# Patient Record
Sex: MALE | Race: WHITE | Employment: UNEMPLOYED | URBAN - NONMETROPOLITAN AREA
[De-identification: names, ages, dates, MRNs, and addresses within clinical notes are randomized per-mention and may not be internally consistent; named-entity substitution may affect disease eponyms.]

---

## 2022-01-20 ENCOUNTER — HOSPITAL ENCOUNTER (EMERGENCY)
Age: 32
Discharge: HOME | End: 2022-01-21
Attending: EMERGENCY MEDICINE
Payer: MEDICARE

## 2022-01-20 DIAGNOSIS — R07.9 CHEST PAIN, UNSPECIFIED TYPE: Primary | ICD-10-CM

## 2022-01-20 PROCEDURE — 99284 EMERGENCY DEPT VISIT MOD MDM: CPT

## 2022-01-20 PROCEDURE — 93005 ELECTROCARDIOGRAM TRACING: CPT | Performed by: EMERGENCY MEDICINE

## 2022-01-20 ASSESSMENT — PAIN SCALES - GENERAL: PAINLEVEL_OUTOF10: 10

## 2022-01-20 ASSESSMENT — PAIN DESCRIPTION - DESCRIPTORS: DESCRIPTORS: ITCHING;THROBBING

## 2022-01-20 ASSESSMENT — PAIN DESCRIPTION - LOCATION: LOCATION: CHEST

## 2022-01-21 ENCOUNTER — HOSPITAL ENCOUNTER (EMERGENCY)
Age: 32
Discharge: HOME | End: 2022-01-21
Payer: MEDICARE

## 2022-01-21 VITALS
WEIGHT: 122 LBS | SYSTOLIC BLOOD PRESSURE: 122 MMHG | HEART RATE: 92 BPM | HEIGHT: 63 IN | DIASTOLIC BLOOD PRESSURE: 74 MMHG | BODY MASS INDEX: 21.62 KG/M2 | OXYGEN SATURATION: 98 % | TEMPERATURE: 97.3 F | RESPIRATION RATE: 18 BRPM

## 2022-01-21 VITALS
DIASTOLIC BLOOD PRESSURE: 75 MMHG | HEIGHT: 63 IN | HEART RATE: 84 BPM | BODY MASS INDEX: 21.62 KG/M2 | WEIGHT: 122 LBS | TEMPERATURE: 97.3 F | SYSTOLIC BLOOD PRESSURE: 120 MMHG | RESPIRATION RATE: 16 BRPM | OXYGEN SATURATION: 97 %

## 2022-01-21 DIAGNOSIS — R07.9 CHEST PAIN, UNSPECIFIED TYPE: Primary | ICD-10-CM

## 2022-01-21 LAB
EKG ATRIAL RATE: 82 BPM
EKG DIAGNOSIS: NORMAL
EKG P AXIS: 39 DEGREES
EKG P-R INTERVAL: 114 MS
EKG Q-T INTERVAL: 370 MS
EKG QRS DURATION: 94 MS
EKG QTC CALCULATION (BAZETT): 432 MS
EKG R AXIS: 23 DEGREES
EKG T AXIS: 21 DEGREES
EKG VENTRICULAR RATE: 82 BPM
TROPONIN T: <0.01 NG/ML

## 2022-01-21 PROCEDURE — 84484 ASSAY OF TROPONIN QUANT: CPT

## 2022-01-21 PROCEDURE — 99284 EMERGENCY DEPT VISIT MOD MDM: CPT

## 2022-01-21 PROCEDURE — 93010 ELECTROCARDIOGRAM REPORT: CPT | Performed by: INTERNAL MEDICINE

## 2022-01-21 RX ORDER — IBUPROFEN 600 MG/1
600 TABLET ORAL ONCE
Status: DISCONTINUED | OUTPATIENT
Start: 2022-01-21 | End: 2022-01-21 | Stop reason: HOSPADM

## 2022-01-21 ASSESSMENT — PAIN SCALES - GENERAL: PAINLEVEL_OUTOF10: 6

## 2022-01-21 NOTE — ED PROVIDER NOTES
Schizo affective schizophrenia ([facility] [address]      FAMILY HISTORY  Family History   Problem Relation Age of Onset    Colon Cancer Neg Hx     Esophageal Cancer Neg Hx     Liver Cancer Neg Hx     Rectal Cancer Neg Hx     Stomach Cancer Neg Hx      SOCIAL HISTORY  Social History     Socioeconomic History    Marital status: Unknown     Spouse name: Not on file    Number of children: Not on file    Years of education: Not on file    Highest education level: Not on file   Occupational History    Not on file   Tobacco Use    Smoking status: Never Smoker    Smokeless tobacco: Never Used   Substance and Sexual Activity    Alcohol use: Never    Drug use: Never    Sexual activity: Not on file   Other Topics Concern    Not on file   Social History Narrative    Not on file     Social Determinants of Health     Financial Resource Strain:     Difficulty of Paying Living Expenses: Not on file   Food Insecurity:     Worried About Running Out of Food in the Last Year: Not on file    Aftab of Food in the Last Year: Not on file   Transportation Needs:     Lack of Transportation (Medical): Not on file    Lack of Transportation (Non-Medical):  Not on file   Physical Activity:     Days of Exercise per Week: Not on file    Minutes of Exercise per Session: Not on file   Stress:     Feeling of Stress : Not on file   Social Connections:     Frequency of Communication with Friends and Family: Not on file    Frequency of Social Gatherings with Friends and Family: Not on file    Attends Taoist Services: Not on file    Active Member of Clubs or Organizations: Not on file    Attends Club or Organization Meetings: Not on file    Marital Status: Not on file   Intimate Partner Violence:     Fear of Current or Ex-Partner: Not on file    Emotionally Abused: Not on file    Physically Abused: Not on file    Sexually Abused: Not on file   Housing Stability:     Unable to Pay for Housing in the Last Year: Not on file    Number of Places Lived in the Last Year: Not on file    Unstable Housing in the Last Year: Not on file       SURGICAL HISTORY  Past Surgical History:   Procedure Laterality Date    COLONOSCOPY      does not know    EGD      does not know     CURRENT MEDICATIONS  Previous Medications    ASPIRIN 81 MG EC TABLET    Take 81 mg by mouth daily    FESOTERODINE FUMARATE ER 8 MG TB24    Take 8 mg by mouth every evening     ALLERGIES  Allergies   Allergen Reactions    Codeine     Iodine        Nursing notes reviewed by myself for past medical history, family history, social history, surgical history, current medications, and allergies. PHYSICAL EXAM  VITAL SIGNS: Triage VS:    ED Triage Vitals   Enc Vitals Group      BP 01/21/22 0303 122/74      Pulse 01/21/22 0303 92      Resp 01/21/22 0303 18      Temp 01/21/22 0303 97.3 °F (36.3 °C)      Temp Source 01/21/22 0303 Oral      SpO2 01/21/22 0303 98 %      Weight 01/21/22 0305 122 lb (55.3 kg)      Height 01/21/22 0305 5' 3\" (1.6 m)      Head Circumference --       Peak Flow --       Pain Score --       Pain Loc --       Pain Edu? --       Excl. in [address]? --      Constitutional: Well developed, Well nourished, nontoxic appearing  HENT: Normocephalic, Atraumatic, Bilateral external ears normal, Nose normal.   Eyes: PERRL, EOMI, Conjunctiva normal, No discharge. No scleral icterus. Neck: Normal range of motion, No tenderness, Supple. Lymphatic: No lymphadenopathy noted. Cardiovascular: Normal heart rate,  Thorax & Lungs: , No respiratory distress  Musculoskeletal: Good range of motion in all major joints as observed. No major deformities noted. Neurologic: Alert & oriented x 3, GCS 15, normal motor function, Normal sensory function, CN II-XII grossly intact as tested, No focal deficits noted. Psychiatric: Affect normal, Judgment normal, Mood normal.     RADIOLOGY  Labs Reviewed - No data to display  I personally reviewed the images.  The radiologist's interpretation reveals:  Last Imaging results   No orders to display       MEDS GIVEN IN ED:  Medications - No data to display  [facility]  33year-old male presents emergency department complaint of chest pain after just being discharged myself less than 2 hours ago. He has had multiple evaluations for chest pain abdominal pain over the last 7 weeks including 6 evaluations for chest pain and evaluations for abdominal pain with reassuring imaging and laboratory studies. On his evaluation this emergency department earlier he had a reassuring EKG and a nonelevated troponin. He was instructed to follow-up with cardiology and given follow-up resource information for cardiology. He was discharged with strict return precautions but presents here because he states he wants more answers. I informed patient that I had no further diagnostic testing to provide him currently and he does not necessitate emergent hospitalization. Patient is frustrated but voices understanding. Discharged again with instructions to call cardiology team today for follow-up appointment. Return precautions provided. Amount and/or Complexity of Data Reviewed  Clinical lab tests: reviewed  Decide to obtain previous medical records or to obtain history from someone other than the patient: yes       -  Patient seen and evaluated in the emergency department. -  Triage and nursing notes reviewed and incorporated. -  Old chart records reviewed and incorporated. -  Work-up included:  See above  -  Results discussed with patient. Appropriate PPE utilized as indicated for entire patient encounter? Time of Disposition: See timeline  ? New Prescriptions    No medications on file     FINAL IMPRESSION  1. Chest pain, unspecified type        Electronically signed by:  [address] Manan, DO, 1/21/2022         [address] Manan, DO  01/21/22 4872

## 2022-01-21 NOTE — ED NOTES
Discharge instructions reviewed and pt acknowledges understanding. Ambulatory at discharge.      Odell Klein RN  01/21/22 8983

## 2022-01-21 NOTE — ED NOTES
Patient apperears to be stable at this time, laughing and talking without any issues. States was at [facility] yesterday for abdominal pain.      Cristiano Lujan RN  01/20/22 5754

## 2022-01-21 NOTE — ED PROVIDER NOTES
CHIEF COMPLAINT    Chief Complaint   Patient presents with    Chest Pain     C/o chest pain across the chest, itching that started yesterday. Patient states he was at [address], \"I received injections that was supossed to help me. \" Patient states he has been washing damon with his sister. Patient was laying in chair in waiting room, playing on cell phone, appears to be in no distress. MERRY Carson is a 33 y.o. male with history of schizoaffective disorder, MRDD, chronic abdominal pain who presents to the ED with complaints of chest pain. Patient has been evaluated multiple times in the emergency department since December 2021 for complaints of abdominal pain and chest pain. During that time has underwent multiple laboratory study evaluations as well as imaging studies of the abdomen and chest.  He has resulted with negative troponins as well as negative D-dimers. He was last seen in the emergency department on 01/12/2022 at [place] at which time he had some T wave inversions in the inferior leads of his EKG but 2 sets of troponin were negative. Case was discussed with cardiology team there and patient was provided outpatient follow-up resource information. He presents here this evening with complaints of continued chest pain across the anterior chest.  It is described as burning and pressure. Pain is constant. Nothing seems to make it much better or worse. Pain does not radiate. When asked about his follow-up appointment the patient tells me his appointment is sometime in April with the cardiology group at [place].  He denies fevers, chills, nausea, vomiting, dizziness, lightheadedness. REVIEW OF SYSTEMS  Constitutional: No fever, chills or recent illness. Eye: No visual changes  HENT: No earache or sore throat. Resp: No SOB or productive cough. Cardio: Complains of chest pain  GI: No abdominal pain, nausea, vomiting, constipation or diarrhea.  No melena. : No dysuria, urgency or frequency. Endocrine: No heat intolerance, no cold intolerance, no polydipsia   Lymphatics: No adenopathy  Musculoskeletal: No new muscle aches or joint pain. Neuro: No headaches. Psych: No homicidal or suicidal thoughts  Skin: No rash, No itching. ?  ? PAST MEDICAL HISTORY  Past Medical History:   Diagnosis Date    Bipolar 1 disorder ([facility]     Chronic generalized abdominal pain     Depression     GERD (gastroesophageal reflux disease)     IBS (irritable bowel syndrome)     Mental impairment     OCD (obsessive compulsive disorder)     Schizo affective schizophrenia ([facility]      FAMILY HISTORY  Family History   Problem Relation Age of Onset    Colon Cancer Neg Hx     Esophageal Cancer Neg Hx     Liver Cancer Neg Hx     Rectal Cancer Neg Hx     Stomach Cancer Neg Hx      SOCIAL HISTORY  Social History     Socioeconomic History    Marital status: Unknown     Spouse name: None    Number of children: None    Years of education: None    Highest education level: None   Occupational History    None   Tobacco Use    Smoking status: Never Smoker    Smokeless tobacco: Never Used   Substance and Sexual Activity    Alcohol use: Never    Drug use: Never    Sexual activity: None   Other Topics Concern    None   Social History Narrative    None     Social Determinants of Health     Financial Resource Strain:     Difficulty of Paying Living Expenses: Not on file   Food Insecurity:     Worried About Running Out of Food in the Last Year: Not on file    Aftab of Food in the Last Year: Not on file   Transportation Needs:     Lack of Transportation (Medical): Not on file    Lack of Transportation (Non-Medical):  Not on file   Physical Activity:     Days of Exercise per Week: Not on file    Minutes of Exercise per Session: Not on file   Stress:     Feeling of Stress : Not on file   Social Connections:     Frequency of Communication with Friends and Family: Not on file    Frequency of Social Gatherings with Friends and Family: Not on file    Attends Pentecostalism Services: Not on file    Active Member of Clubs or Organizations: Not on file    Attends Club or Organization Meetings: Not on file    Marital Status: Not on file   Intimate Partner Violence:     Fear of Current or Ex-Partner: Not on file    Emotionally Abused: Not on file    Physically Abused: Not on file    Sexually Abused: Not on file   Housing Stability:     Unable to Pay for Housing in the Last Year: Not on file    Number of [place] in the Last Year: Not on file    Unstable Housing in the Last Year: Not on file       SURGICAL HISTORY  Past Surgical History:   Procedure Laterality Date    COLONOSCOPY      does not know    EGD      does not know     CURRENT MEDICATIONS  Previous Medications    ASPIRIN 81 MG EC TABLET    Take 81 mg by mouth daily    FESOTERODINE FUMARATE ER 8 MG TB24    Take 8 mg by mouth every evening     ALLERGIES  Allergies   Allergen Reactions    Codeine     Iodine        Nursing notes reviewed by myself for past medical history, family history, social history, surgical history, current medications, and allergies. PHYSICAL EXAM  VITAL SIGNS: Triage VS:    ED Triage Vitals [01/20/22 2307]   Enc Vitals Group      /75      Pulse 101      Resp 16      Temp 97.3 °F (36.3 °C)      Temp Source Oral      SpO2 97 %      Weight 122 lb (55.3 kg)      Height 5' 3\" (1.6 m)      Head Circumference       Peak Flow       Pain Score       Pain Loc       Pain Edu? Excl. in [address]? Constitutional: Well developed, Well nourished, nontoxic appearing  HENT: Normocephalic, Atraumatic, Bilateral external ears normal, Oropharynx moist, No oral exudates, Nose normal.   Eyes: PERRL, EOMI, Conjunctiva normal, No discharge. No scleral icterus. Neck: Normal range of motion, No tenderness, Supple. Lymphatic: No lymphadenopathy noted.    Cardiovascular: Normal heart rate, Normal rhythm, No murmurs, gallops or rubs. Thorax & Lungs: Normal breath sounds, No respiratory distress, No wheezing. Abdomen: Soft, No tenderness, No masses, No pulsatile masses, No distention, Normal bowel sounds  Skin: Warm, Dry, Pink, No mottling, No erythema, No rash. Back: No tenderness, No CVA tenderness. Extremities: No edema, No tenderness, No cyanosis, Normal perfusion, No clubbing. Musculoskeletal: Good range of motion in all major joints as observed. No major deformities noted. Neurologic: Alert & oriented x 3,  No focal deficits noted. Psychiatric: Affect normal, Judgment normal, Mood normal.   EKG  Per my interpretation demonstrates normal sinus rhythm with sinus arrhythmia at a rate of 82 bpm.  Normal axis. Normal intervals. No acute ST segment changes. RADIOLOGY  Labs Reviewed   TROPONIN     I personally reviewed the images. The radiologist's interpretation reveals:  Last Imaging results   No orders to display       MEDS GIVEN IN ED:  Medications   ibuprofen (ADVIL;MOTRIN) tablet 600 mg (600 mg Oral Not Given 1/21/22 0138)     [address] [place] [address] Drive [facility]  68-year-old male presents the emergency department with complaints of chest pain across the anterior chest.  Has been evaluated for chest pain or abdominal pain multiple times in the last month with reassuring evaluations. His initial vital signs here are reassuring. He is nontoxic-appearing on exam.  His lungs are clear to auscultation bilaterally. EKG obtained here is without acute ischemic changes. At this time we will obtain troponin level as well. Given his multiple evaluations recently do not feel that further work-up is indicated beyond an EKG and troponin here. Patient requested pain medication here and Motrin was ordered which she refused. His troponin remains nonelevated.   At this time given the patient's multiple evaluations in the emergency department for similar complaint recently I do not feel that further work-up is indicated and he is appropriate discharge. Provided with follow-up resource information for cardiology. Return precautions provided. Amount and/or Complexity of Data Reviewed  Clinical lab tests: reviewed  Decide to obtain previous medical records or to obtain history from someone other than the patient: yes       -  Patient seen and evaluated in the emergency department. -  Triage and nursing notes reviewed and incorporated. -  Old chart records reviewed and incorporated. -  Work-up included:  See above  -  Results discussed with patient. Appropriate PPE utilized as indicated for entire patient encounter? Time of Disposition: See timeline  ? New Prescriptions    No medications on file     FINAL IMPRESSION  1. Chest pain, unspecified type        Electronically signed by:  [address] Manan, DO, 1/21/2022         [address] Manan, DO  01/21/22 9316

## 2022-01-21 NOTE — ED TRIAGE NOTES
Arrived ambulatory to room 11 for triage. Tolerated without difficulty. Bed in lowest position. Call light given.

## 2022-01-21 NOTE — ED NOTES
Patient upset that it was taking to long, he refused pain medication stating that it will not work, patient left without discharge paperwork.      Devi Campos RN  01/21/22 3123

## 2022-02-22 ENCOUNTER — HOSPITAL ENCOUNTER (EMERGENCY)
Age: 32
Discharge: HOME | End: 2022-02-22
Attending: EMERGENCY MEDICINE
Payer: MEDICARE

## 2022-02-22 VITALS
HEART RATE: 106 BPM | OXYGEN SATURATION: 97 % | SYSTOLIC BLOOD PRESSURE: 113 MMHG | DIASTOLIC BLOOD PRESSURE: 75 MMHG | TEMPERATURE: 99 F | RESPIRATION RATE: 20 BRPM

## 2022-02-22 DIAGNOSIS — G89.29 CHRONIC CHEST WALL PAIN: Primary | ICD-10-CM

## 2022-02-22 DIAGNOSIS — R07.89 CHRONIC CHEST WALL PAIN: Primary | ICD-10-CM

## 2022-02-22 DIAGNOSIS — F41.1 ANXIETY STATE: ICD-10-CM

## 2022-02-22 PROCEDURE — 99283 EMERGENCY DEPT VISIT LOW MDM: CPT

## 2022-02-22 PROCEDURE — 93005 ELECTROCARDIOGRAM TRACING: CPT | Performed by: EMERGENCY MEDICINE

## 2022-02-22 NOTE — ED NOTES
Pt arrived per squad, no s/s of acute distress. Respirations regular and even, skin warm and dry. 12 lead EKG obtained upon arrival, patient placed on telemetry monitoring w/ continuous pulse ox.    VSS, call light in hand     Georgina Bustamante RN  02/22/22 6477

## 2022-02-22 NOTE — ED PROVIDER NOTES
Triage Chief Complaint:   Tachycardia (in by squad,  w/out any distress )    [place]:  Jose F Schaeffer is a 33 y.o. male that presents to the ED complaint of chest pain. The patient has a very complex past medical history has been in emergency departments around [place] in the [facility] over 20 something times. He presents to the ED states that he was walking he called 911. He was complaint of chest pain he does have a past history of tachycardia questionable A. fib on Cardizem currently in sinus rhythm at a rate of 109 does enter the room. Immediately asked me if he is to be admitted and to stay. He is not suicidal he has a history of schizoaffective disorder denies any active auditory visualizations. No shortness of breath patient smokes denies vaping no marijuana denies IV drug use. Patient works she typically takes the bus to [place].   He denies being under any stressful situation he has residences in [facility]        Past Medical History:   Diagnosis Date    B[facility] 1 [facility] [address]     Chronic generalized abdominal pain     Depression     GERD (gastroesophageal reflux disease)     IBS (irritable bowel syndrome)     Mental impairment     OCD (obsessive compulsive disorder)     Schizo affective schizophrenia ([facility] [address]      Past Surgical History:   Procedure Laterality Date    COLONOSCOPY      does not know    EGD      does not know     Family History   Problem Relation Age of Onset    Colon Cancer Neg Hx     Esophageal Cancer Neg Hx     Liver Cancer Neg Hx     Rectal Cancer Neg Hx     Stomach Cancer Neg Hx      Social History     Socioeconomic History    Marital status: Unknown     Spouse name: Not on file    Number of children: Not on file    Years of education: Not on file    Highest education level: Not on file   Occupational History    Not on file   Tobacco Use    Smoking status: Never Smoker    Smokeless tobacco: Never Used   Substance and Sexual Activity    Alcohol use: Never    Drug use: Never    Sexual activity: Not on file   Other Topics Concern    Not on file   Social History Narrative    Not on file     Social Determinants of Health     Financial Resource Strain:     Difficulty of Paying Living Expenses: Not on file   Food Insecurity:     Worried About Running Out of Food in the Last Year: Not on file    Aftab of Food in the Last Year: Not on file   Transportation Needs:     Lack of Transportation (Medical): Not on file    Lack of Transportation (Non-Medical): Not on file   Physical Activity:     Days of Exercise per Week: Not on file    Minutes of Exercise per Session: Not on file   Stress:     Feeling of Stress : Not on file   Social Connections:     Frequency of Communication with Friends and Family: Not on file    Frequency of Social Gatherings with Friends and Family: Not on file    Attends Yarsanism Services: Not on file    Active Member of [address] [place] [address] [employer] or Organizations: Not on file    Attends Club or Organization Meetings: Not on file    Marital Status: Not on file   Intimate Partner Violence:     Fear of Current or Ex-Partner: Not on file    Emotionally Abused: Not on file    Physically Abused: Not on file    Sexually Abused: Not on file   Housing Stability:     Unable to Pay for Housing in the Last Year: Not on file    Number of [place] in the Last Year: Not on file    Unstable Housing in the Last Year: Not on file     No current facility-administered medications for this encounter. Current Outpatient Medications   Medication Sig Dispense Refill    aspirin 81 MG EC tablet Take 81 mg by mouth daily      Fesoterodine Fumarate ER 8 MG TB24 Take 8 mg by mouth every evening       Allergies   Allergen Reactions    Codeine     Iodine          ROS:    Review of Systems   Cardiovascular: Positive for palpitations. Negative for leg swelling. Psychiatric/Behavioral: Negative for confusion, dysphoric mood and suicidal ideas.  The patient is nervous/anxious. All other systems reviewed and are negative. Nursing Notes Reviewed    Physical Exam:      ED Triage Vitals [02/22/22 1558]   Enc Vitals Group      /83      Pulse 106      Resp 24      Temp 99 °F (37.2 °C)      Temp Source Temporal      SpO2 98 %      Weight       Height       Head Circumference       Peak Flow       Pain Score       Pain Loc       Pain Edu? Excl. in [address]? Physical Exam  Vitals and nursing note reviewed. Exam conducted with a chaperone present. Constitutional:       General: He is not in acute distress. Appearance: He is well-developed. He is not ill-appearing or toxic-appearing. HENT:      Head: Normocephalic and atraumatic. Right Ear: Ear canal and external ear normal.      Left Ear: Ear canal and external ear normal.      Nose: Nose normal.      Mouth/Throat:      Mouth: Mucous membranes are moist.   Eyes:      General: No scleral icterus. Right eye: No discharge. Left eye: No discharge. Conjunctiva/sclera: Conjunctivae normal.      Pupils: Pupils are equal, round, and reactive to light. Neck:      Thyroid: No thyromegaly. Vascular: No JVD. Trachea: No tracheal deviation. Cardiovascular:      Rate and Rhythm: Regular rhythm. Tachycardia present. Heart sounds: Normal heart sounds. No murmur heard. No friction rub. No gallop. Pulmonary:      Effort: Pulmonary effort is normal. No respiratory distress. Breath sounds: Normal breath sounds. No stridor. No wheezing or rales. Chest:      Chest wall: No tenderness. Abdominal:      General: Abdomen is flat. Bowel sounds are normal. There is no distension. Palpations: Abdomen is soft. There is no mass. Tenderness: There is no abdominal tenderness. There is no guarding or rebound. Hernia: No hernia is present. Musculoskeletal:         General: No tenderness or deformity. Normal range of motion.       Cervical back: Normal range of motion and neck supple. Lymphadenopathy:      Cervical: No cervical adenopathy. Skin:     General: Skin is warm and dry. Capillary Refill: Capillary refill takes less than 2 seconds. Coloration: Skin is not pale. Findings: No erythema or rash. Neurological:      General: No focal deficit present. Mental Status: He is alert and oriented to person, place, and time. Cranial Nerves: No cranial nerve deficit. Sensory: No sensory deficit. Deep Tendon Reflexes: Reflexes are normal and symmetric. Reflexes normal.   Psychiatric:         Attention and Perception: Attention normal.         Mood and Affect: Mood is anxious. Speech: Speech normal.         Behavior: Behavior normal.         Thought Content: Thought content normal.         Judgment: Judgment normal.         I have reviewed and interpreted all of the currently available lab results from this visit (ifapplicable):  Results for orders placed or performed during the hospital encounter of 02/22/22   EKG 12 Lead   Result Value Ref Range    Ventricular Rate 109 BPM    Atrial Rate 109 BPM    P-R Interval 116 ms    QRS Duration 90 ms    Q-T Interval 332 ms    QTc Calculation (Bazett) 447 ms    P Axis 47 degrees    R Axis 32 degrees    T Axis 39 degrees    Diagnosis Sinus tachycardia  Otherwise normal ECG         Radiographs (if obtained):  [] The following radiograph wasinterpreted by myself in the absence of a radiologist:   [] Radiologist's Report Reviewed:  No orders to display         EKG (if obtained): (All EKG's are interpreted by myself in the absence of a cardiologist)    The 12 lead EKG was interpreted by me, and the interpretation is as follows:  sinus tachycardia, asnx=938, rate = 109. Intervals are within the normal range. QTc is not prolonged. ST elevations are not present. T wave inversions are not present. Non-specific T wave changes are not present.   Delta waves, Brugada Syndrome, and Short MA are not present. There is no acute ischemia. Chart review shows recent radiographs:  No results found. MDM:    Janet Rubio presents ED via 911. He is an anxious well-appearing nontoxic male resting heart rate 109. He has no high risk features and review of system physical exam is unremarkable. He does have diffuse chest wall pain clearly has some underlying somatic complaints numerous ED visits. He needs appropriate follow-up with his PCP for further management I would highly recommend future clinicians not to choose potential medical intervention to satisfy the patient but make appropriate medical decisions. Patient clearly is not having an emergency medical condition. He wants to be admitted this is clearly an appropriate. Please note that portions of this note may have been completed with a voice recognition/dictation program. Efforts were made to edit the dictations but occasionally words are mis-transcribed.      All pertinent Lab data and radiographic results reviewed with patient at bedside. The patient and/or the family were informed of the results of any tests/labs/imaging, the treatment plan, and time was allotted to answer questions. See chart for details of medications given during the ED stay.     The likelihood of other entities in the differential is insufficient to justify any further testing for them. This was explained to the patient. The patient was advised that persistent or worsening symptoms would require further evaluation.         Clinical Impression:  1. Chronic chest wall pain    2. Anxiety state      Disposition referral (if applicable):  Geoffrey Anton  [address] [place] [address]  [phone]    Schedule an appointment as soon as possible for a visit in 1 day  If symptoms worsen    Disposition medications (if applicable):  New Prescriptions    No medications on file           Bernardo Harkins DO, FACEP      Comment: Please note this report has been produced using speech recognition software and maycontain errors related to that system including errors in grammar, punctuation, and spelling, as well as words and phrases that may be inappropriate. If there are any questions or concerns please feel free to contact thedictating provider for clarification.         Radhames Hayden,   02/22/22 9408

## 2022-02-23 LAB
EKG ATRIAL RATE: 109 BPM
EKG DIAGNOSIS: NORMAL
EKG P AXIS: 47 DEGREES
EKG P-R INTERVAL: 116 MS
EKG Q-T INTERVAL: 332 MS
EKG QRS DURATION: 90 MS
EKG QTC CALCULATION (BAZETT): 447 MS
EKG R AXIS: 32 DEGREES
EKG T AXIS: 39 DEGREES
EKG VENTRICULAR RATE: 109 BPM

## 2022-02-23 PROCEDURE — 93010 ELECTROCARDIOGRAM REPORT: CPT | Performed by: INTERNAL MEDICINE

## 2022-10-08 ENCOUNTER — HOSPITAL ENCOUNTER (EMERGENCY)
Age: 32
Discharge: HOME | End: 2022-10-08
Attending: EMERGENCY MEDICINE
Payer: COMMERCIAL

## 2022-10-08 VITALS
TEMPERATURE: 98.1 F | RESPIRATION RATE: 18 BRPM | SYSTOLIC BLOOD PRESSURE: 137 MMHG | HEART RATE: 85 BPM | WEIGHT: 121.6 LBS | OXYGEN SATURATION: 97 % | DIASTOLIC BLOOD PRESSURE: 78 MMHG | BODY MASS INDEX: 21.55 KG/M2 | HEIGHT: 63 IN

## 2022-10-08 DIAGNOSIS — R10.84 GENERALIZED ABDOMINAL PAIN: Primary | ICD-10-CM

## 2022-10-08 LAB
ALBUMIN SERPL-MCNC: 4.6 GM/DL (ref 3.4–5)
ALP BLD-CCNC: 70 IU/L (ref 40–128)
ALT SERPL-CCNC: 15 U/L (ref 10–40)
ANION GAP SERPL CALCULATED.3IONS-SCNC: 9 MMOL/L (ref 4–16)
AST SERPL-CCNC: 26 IU/L (ref 15–37)
BASOPHILS ABSOLUTE: 0 K/CU MM
BASOPHILS RELATIVE PERCENT: 0.2 % (ref 0–1)
BILIRUB SERPL-MCNC: 0.4 MG/DL (ref 0–1)
BILIRUBIN URINE: NEGATIVE
BLOOD, URINE: NEGATIVE
BUN BLDV-MCNC: 14 MG/DL (ref 6–23)
CALCIUM SERPL-MCNC: 9.5 MG/DL (ref 8.3–10.6)
CHLORIDE BLD-SCNC: 104 MMOL/L (ref 99–110)
CLARITY: CLEAR
CO2: 27 MMOL/L (ref 21–32)
COLOR: YELLOW
CREAT SERPL-MCNC: 0.7 MG/DL (ref 0.9–1.3)
DIFFERENTIAL TYPE: ABNORMAL
EOSINOPHILS ABSOLUTE: 0.1 K/CU MM
EOSINOPHILS RELATIVE PERCENT: 2.2 % (ref 0–3)
GFR AFRICAN AMERICAN: >60 ML/MIN/1.73M2
GFR NON-AFRICAN AMERICAN: >60 ML/MIN/1.73M2
GLUCOSE BLD-MCNC: 89 MG/DL (ref 70–99)
GLUCOSE, URINE: NEGATIVE MG/DL
HCT VFR BLD CALC: 40.6 % (ref 42–52)
HEMOGLOBIN: 13.4 GM/DL (ref 13.5–18)
IMMATURE NEUTROPHIL %: 0.2 % (ref 0–0.43)
KETONES, URINE: NEGATIVE MG/DL
LEUKOCYTE ESTERASE, URINE: NEGATIVE
LIPASE: 43 IU/L (ref 13–60)
LYMPHOCYTES ABSOLUTE: 2 K/CU MM
LYMPHOCYTES RELATIVE PERCENT: 33.1 % (ref 24–44)
MCH RBC QN AUTO: 31.8 PG (ref 27–31)
MCHC RBC AUTO-ENTMCNC: 33 % (ref 32–36)
MCV RBC AUTO: 96.4 FL (ref 78–100)
MONOCYTES ABSOLUTE: 0.5 K/CU MM
MONOCYTES RELATIVE PERCENT: 7.6 % (ref 0–4)
NITRITE URINE, QUANTITATIVE: NEGATIVE
NUCLEATED RBC %: 0 %
PDW BLD-RTO: 12.1 % (ref 11.7–14.9)
PH, URINE: 6
PLATELET # BLD: 206 K/CU MM (ref 140–440)
PMV BLD AUTO: 10 FL (ref 7.5–11.1)
POTASSIUM SERPL-SCNC: 3.5 MMOL/L (ref 3.5–5.1)
PROTEIN UA: NEGATIVE MG/DL
RBC # BLD: 4.21 M/CU MM (ref 4.6–6.2)
SEGMENTED NEUTROPHILS ABSOLUTE COUNT: 3.4 K/CU MM
SEGMENTED NEUTROPHILS RELATIVE PERCENT: 56.7 % (ref 36–66)
SODIUM BLD-SCNC: 140 MMOL/L (ref 135–145)
SPECIFIC GRAVITY UA: >1.03
TOTAL IMMATURE NEUTOROPHIL: 0.01 K/CU MM
TOTAL NUCLEATED RBC: 0 K/CU MM
TOTAL PROTEIN: 6.8 GM/DL (ref 6.4–8.2)
UROBILINOGEN, URINE: 0.2 MG/DL
WBC # BLD: 6 K/CU MM (ref 4–10.5)

## 2022-10-08 PROCEDURE — 6360000002 HC RX W HCPCS: Performed by: EMERGENCY MEDICINE

## 2022-10-08 PROCEDURE — 99284 EMERGENCY DEPT VISIT MOD MDM: CPT

## 2022-10-08 PROCEDURE — 85025 COMPLETE CBC W/AUTO DIFF WBC: CPT

## 2022-10-08 PROCEDURE — 83690 ASSAY OF LIPASE: CPT

## 2022-10-08 PROCEDURE — 80053 COMPREHEN METABOLIC PANEL: CPT

## 2022-10-08 PROCEDURE — 96372 THER/PROPH/DIAG INJ SC/IM: CPT

## 2022-10-08 PROCEDURE — 6370000000 HC RX 637 (ALT 250 FOR IP): Performed by: EMERGENCY MEDICINE

## 2022-10-08 PROCEDURE — 81003 URINALYSIS AUTO W/O SCOPE: CPT

## 2022-10-08 RX ORDER — KETOROLAC TROMETHAMINE 30 MG/ML
30 INJECTION, SOLUTION INTRAMUSCULAR; INTRAVENOUS ONCE
Status: COMPLETED | OUTPATIENT
Start: 2022-10-08 | End: 2022-10-08

## 2022-10-08 RX ORDER — ACETAMINOPHEN 500 MG
1000 TABLET ORAL ONCE
Status: COMPLETED | OUTPATIENT
Start: 2022-10-08 | End: 2022-10-08

## 2022-10-08 RX ADMIN — KETOROLAC TROMETHAMINE 30 MG: 30 INJECTION, SOLUTION INTRAMUSCULAR; INTRAVENOUS at 04:25

## 2022-10-08 RX ADMIN — ACETAMINOPHEN 1000 MG: 500 TABLET ORAL at 04:25

## 2022-10-08 ASSESSMENT — PAIN - FUNCTIONAL ASSESSMENT: PAIN_FUNCTIONAL_ASSESSMENT: 0-10

## 2022-10-08 ASSESSMENT — PAIN DESCRIPTION - LOCATION
LOCATION: ABDOMEN
LOCATION: ABDOMEN

## 2022-10-08 ASSESSMENT — ENCOUNTER SYMPTOMS
DIARRHEA: 0
RHINORRHEA: 0
ABDOMINAL PAIN: 1
WHEEZING: 0
CONSTIPATION: 0
NAUSEA: 0
SINUS PRESSURE: 0
SHORTNESS OF BREATH: 0
VOMITING: 0
COUGH: 0
SORE THROAT: 0

## 2022-10-08 ASSESSMENT — PAIN SCALES - GENERAL
PAINLEVEL_OUTOF10: 5
PAINLEVEL_OUTOF10: 10

## 2022-10-08 ASSESSMENT — PAIN DESCRIPTION - DESCRIPTORS: DESCRIPTORS: ACHING

## 2022-10-08 NOTE — ED PROVIDER NOTES
Emergency Department Encounter    Patient: Gonzalez Pham  MRN: [id number]  : 1990  Date of Evaluation: 10/8/2022  ED Provider:  Shaun Damon DO    Triage Chief Complaint:   Abdominal Pain (Just left [address] being seen for same, pain x 10 months, seen multiple times for same)    HPI:  Gonzalez Pham is a 27 y.o. male with past medical history as listed below, including bipolar disorder, chronic abdominal pain, schizoaffective schizophrenia who presents with abdominal pain. Patient states that he has had greater than 6 months of abdominal pain, this is his typical abdominal pain. He does frequently go to emergency departments for this, he goes to multiple ERs, including the [facility], [facility], [facility]. Patient states that he feels as though his pain was worse tonight and his Bentyl p.o. was not helping which prompted him to come in. He denies any new associated symptoms. Denies any changes in bowel or bladder habits. Denies F/C, CP, SOB, palpitations, N/V, dysuria, diarrhea and constipatin. ROS:  Review of Systems   Constitutional:  Negative for chills and fever. HENT:  Negative for congestion, rhinorrhea, sinus pressure and sore throat. Eyes:  Negative for visual disturbance. Respiratory:  Negative for cough, shortness of breath and wheezing. Cardiovascular:  Negative for chest pain and palpitations. Gastrointestinal:  Positive for abdominal pain. Negative for constipation, diarrhea, nausea and vomiting. Genitourinary:  Negative for dysuria, frequency and urgency. Musculoskeletal:  Negative for arthralgias and myalgias. Skin:  Negative for rash. Neurological:  Negative for dizziness and syncope. Psychiatric/Behavioral:  Negative for agitation, behavioral problems and confusion.         Past Medical History:   Diagnosis Date    Bipolar 1 disorder ([facility] [address]     Chronic generalized abdominal pain     Depression     GERD (gastroesophageal reflux disease)     IBS (irritable bowel syndrome)     Mental impairment     OCD (obsessive compulsive disorder)     Schizo affective schizophrenia ([facility] [address]      Past Surgical History:   Procedure Laterality Date    COLONOSCOPY      does not know    EGD      does not know     Family History   Problem Relation Age of Onset    Colon Cancer Neg Hx     Esophageal Cancer Neg Hx     Liver Cancer Neg Hx     Rectal Cancer Neg Hx     Stomach Cancer Neg Hx      Social History     Socioeconomic History    Marital status: Unknown     Spouse name: Not on file    Number of children: Not on file    Years of education: Not on file    Highest education level: Not on file   Occupational History    Not on file   Tobacco Use    Smoking status: Never    Smokeless tobacco: Never   Substance and Sexual Activity    Alcohol use: Never    Drug use: Never    Sexual activity: Not on file   Other Topics Concern    Not on file   Social History Narrative    Not on file     Social Determinants of Health     Financial Resource Strain: Not on file   Food Insecurity: Not on file   Transportation Needs: Not on file   Physical Activity: Not on file   Stress: Not on file   Social Connections: Not on file   Intimate Partner Violence: Not on file   Housing Stability: Not on file     No current facility-administered medications for this encounter.      Current Outpatient Medications   Medication Sig Dispense Refill    aspirin 81 MG EC tablet Take 81 mg by mouth daily      Fesoterodine Fumarate ER 8 MG TB24 Take 8 mg by mouth every evening       Allergies   Allergen Reactions    Codeine     Iodine        Nursing Notes Reviewed    Physical Exam:  Triage VS:    ED Triage Vitals   Enc Vitals Group      BP 10/08/22 0151 137/78      Heart Rate 10/08/22 0151 85      Resp 10/08/22 0151 18      Temp 10/08/22 0151 98.1 °F (36.7 °C)      Temp Source 10/08/22 0151 Oral      SpO2 10/08/22 0151 97 %      Weight 10/08/22 0148 122 lb (55.3 kg)      Height 10/08/22 0148 5' 3\" (1.6 m)      Head Circumference -- Peak Flow --       Pain Score --       Pain Loc --       Pain Edu? --       Excl. in [address]? --      Physical Exam  Vitals and nursing note reviewed. Constitutional:       General: He is not in acute distress. Appearance: He is well-developed. He is not ill-appearing or toxic-appearing. HENT:      Head: Normocephalic and atraumatic. Nose: Nose normal.      Mouth/Throat:      Mouth: Mucous membranes are moist.   Eyes:      Conjunctiva/sclera: Conjunctivae normal.   Cardiovascular:      Rate and Rhythm: Normal rate and regular rhythm. Pulses: Normal pulses. Pulmonary:      Effort: Pulmonary effort is normal. No respiratory distress. Breath sounds: Normal breath sounds. No wheezing, rhonchi or rales. Abdominal:      General: Abdomen is flat. Bowel sounds are normal. There is no distension. Palpations: Abdomen is soft. Tenderness: There is no abdominal tenderness. There is no guarding or rebound. Musculoskeletal:         General: Normal range of motion. Skin:     General: Skin is warm. Capillary Refill: Capillary refill takes less than 2 seconds. Neurological:      Mental Status: He is alert and oriented to person, place, and time. Mental status is at baseline.    Psychiatric:         Mood and Affect: Mood normal.            I have reviewed and interpreted all of the currently available lab results from this visit (if applicable):  Results for orders placed or performed during the hospital encounter of 10/08/22   CBC with Auto Differential   Result Value Ref Range    WBC 6.0 4.0 - 10.5 K/CU MM    RBC 4.21 (L) 4.6 - 6.2 M/CU MM    Hemoglobin 13.4 (L) 13.5 - 18.0 GM/DL    Hematocrit 40.6 (L) 42 - 52 %    MCV 96.4 78 - 100 FL    MCH 31.8 (H) 27 - 31 PG    MCHC 33.0 32.0 - 36.0 %    RDW 12.1 11.7 - 14.9 %    Platelets 527 648 - 604 K/CU MM    MPV 10.0 7.5 - 11.1 FL    Differential Type AUTOMATED DIFFERENTIAL     Segs Relative 56.7 36 - 66 %    Lymphocytes % 33.1 24 - 44 % Monocytes % 7.6 (H) 0 - 4 %    Eosinophils % 2.2 0 - 3 %    Basophils % 0.2 0 - 1 %    Segs Absolute 3.4 K/CU MM    Lymphocytes Absolute 2.0 K/CU MM    Monocytes Absolute 0.5 K/CU MM    Eosinophils Absolute 0.1 K/CU MM    Basophils Absolute 0.0 K/CU MM    Nucleated RBC % 0.0 %    Total Nucleated RBC 0.0 K/CU MM    Total Immature Neutrophil 0.01 K/CU MM    Immature Neutrophil % 0.2 0 - 0.43 %   CMP   Result Value Ref Range    Sodium 140 135 - 145 MMOL/L    Potassium 3.5 3.5 - 5.1 MMOL/L    Chloride 104 99 - 110 mMol/L    CO2 27 21 - 32 MMOL/L    BUN 14 6 - 23 MG/DL    Creatinine 0.7 (L) 0.9 - 1.3 MG/DL    Glucose 89 70 - 99 MG/DL    Calcium 9.5 8.3 - 10.6 MG/DL    Albumin 4.6 3.4 - 5.0 GM/DL    Total Protein 6.8 6.4 - 8.2 GM/DL    Total Bilirubin 0.4 0.0 - 1.0 MG/DL    ALT 15 10 - 40 U/L    AST 26 15 - 37 IU/L    Alkaline Phosphatase 70 40 - 128 IU/L    GFR Non-African American >60 >60 mL/min/1.73m2    GFR African American >60 >60 mL/min/1.73m2    Anion Gap 9 4 - 16   Lipase   Result Value Ref Range    Lipase 43 13 - 60 IU/L   Urinalysis   Result Value Ref Range    Color, UA YELLOW     Clarity, UA CLEAR     Glucose, Urine NEGATIVE MG/DL    Bilirubin Urine NEGATIVE     Ketones, Urine NEGATIVE MG/DL    Specific Gravity, UA >1.030     Blood, Urine NEGATIVE     pH, Urine 6.0     Protein, UA NEGATIVE MG/DL    Urobilinogen, Urine 0.2 MG/DL    Nitrite Urine, Quantitative NEGATIVE     Leukocyte Esterase, Urine NEGATIVE       Radiographs (if obtained):    [] Radiologist's Report Reviewed:  No orders to display           Chart review shows recent radiographs:  No results found. MDM:  Patient seen and examined. Patient without leukocytosis, hemoglobin and platelets stable. Electrolytes, BUN, creatinine, ALT, AST and lipase within acceptable limits. UA without signs of infection. Patient is well-appearing, nontoxic-appearing.   Abdomen is benign on exam.  As patient does have a history of chronic abdominal pain, and this is similar to previous episodes, do not think you benefit from imaging at this time. Patient provided Toradol and Tylenol here in the ED. Patient with improvement of symptoms here in the ED. All labs discussed with patient. Patient's vital signs are reassuring, I do think she is appropriate for outpatient follow-up at this time. Patient to follow-up with his primary care provider in 1 to 2 days. He is to return to the emergency department if symptoms worsen, return precautions are discussed and he does verbalize understanding these. All questions are answered and he is agreeable with plan of care. 4:56 AM  Patient is sleeping in bed. He is easily arousable. He states that he is feeling better and does feel ready for discharge home. Clinical Impression:  1. Generalized abdominal pain      Disposition referral (if applicable):  Hayden Melendrez  [address] [place] [address]  [phone]    Schedule an appointment as soon as possible for a visit in 2 days      [facility] Emergency Department  [address] [facility] [id number] [phone]  Go to   As needed, If symptoms worsen  Disposition medications (if applicable):  New Prescriptions    No medications on file       Comment: Please note this report has been produced using speech recognition software and may contain errors related to that system including errors in grammar, punctuation, and spelling, as well as words and phrases that may be inappropriate. Efforts were made to edit the dictations.        [id number] [facility]  10/08/22 1658

## 2022-10-08 NOTE — ED NOTES
Pt left and refused to sign his discharge papers.  Pt was given his AVS.      Doc RADHA Cross  10/08/22 9784

## 2022-10-08 NOTE — ED NOTES
Pt to nursing station with steady gait stating that he had urinated on himself and ask to be cleaned up. Pt was given wipes and diapers to clean himself up.      Bertha Pugh RN  10/08/22 6196

## 2022-12-08 ENCOUNTER — HOSPITAL ENCOUNTER (EMERGENCY)
Age: 32
Discharge: HOME | End: 2022-12-09
Attending: EMERGENCY MEDICINE
Payer: COMMERCIAL

## 2022-12-08 DIAGNOSIS — R10.9 CHRONIC ABDOMINAL PAIN: Primary | ICD-10-CM

## 2022-12-08 DIAGNOSIS — G89.29 CHRONIC ABDOMINAL PAIN: Primary | ICD-10-CM

## 2022-12-08 PROCEDURE — 99283 EMERGENCY DEPT VISIT LOW MDM: CPT

## 2022-12-08 RX ORDER — ACETAMINOPHEN 325 MG/1
650 TABLET ORAL ONCE
Status: COMPLETED | OUTPATIENT
Start: 2022-12-08 | End: 2022-12-09

## 2022-12-08 RX ORDER — MAGNESIUM HYDROXIDE/ALUMINUM HYDROXICE/SIMETHICONE 120; 1200; 1200 MG/30ML; MG/30ML; MG/30ML
30 SUSPENSION ORAL ONCE
Status: COMPLETED | OUTPATIENT
Start: 2022-12-08 | End: 2022-12-09

## 2022-12-08 RX ORDER — FAMOTIDINE 20 MG/1
20 TABLET, FILM COATED ORAL ONCE
Status: COMPLETED | OUTPATIENT
Start: 2022-12-08 | End: 2022-12-09

## 2022-12-09 VITALS
TEMPERATURE: 97.8 F | RESPIRATION RATE: 15 BRPM | DIASTOLIC BLOOD PRESSURE: 80 MMHG | SYSTOLIC BLOOD PRESSURE: 138 MMHG | OXYGEN SATURATION: 99 % | HEART RATE: 72 BPM

## 2022-12-09 VITALS — RESPIRATION RATE: 12 BRPM | HEART RATE: 67 BPM | OXYGEN SATURATION: 98 %

## 2022-12-09 DIAGNOSIS — Z00.00 EVALUATION BY MEDICAL SERVICE REQUIRED: Primary | ICD-10-CM

## 2022-12-09 PROCEDURE — 6370000000 HC RX 637 (ALT 250 FOR IP): Performed by: EMERGENCY MEDICINE

## 2022-12-09 PROCEDURE — 99283 EMERGENCY DEPT VISIT LOW MDM: CPT

## 2022-12-09 RX ADMIN — ALUMINUM HYDROXIDE, MAGNESIUM HYDROXIDE, AND SIMETHICONE 30 ML: 200; 200; 20 SUSPENSION ORAL at 00:06

## 2022-12-09 RX ADMIN — FAMOTIDINE 20 MG: 20 TABLET, FILM COATED ORAL at 00:05

## 2022-12-09 RX ADMIN — ACETAMINOPHEN 650 MG: 325 TABLET ORAL at 00:05

## 2022-12-09 ASSESSMENT — PAIN SCALES - GENERAL
PAINLEVEL_OUTOF10: 10
PAINLEVEL_OUTOF10: 10

## 2022-12-09 NOTE — ED NOTES
Updated on plan of care and discharge. Pt states \" why is he discharging me, this is ridiculous\". Dr Ramiro Martin made aware.         Unknown RADHA Lizarraga  12/09/22 1482

## 2022-12-09 NOTE — ED PROVIDER NOTES
Triage Chief Complaint:   Chest Pain    [place]:  Farooq Burk is a 27 y.o. male that presents via EMS for chronic abdominal pain. Patient states that he has abdominal and chest pain for months. He has been seen in multiple other emergency departments. States the pain is sometimes worse after eating and moving. States it is sharp pain and dull in nature. Denies any fevers, shortness of breath, vomiting, diarrhea, constipation. Denies other complaints. Patient has history of schizoaffective schizophrenia and bipolar disorder. ROS:  At least 10 systems reviewed and otherwise acutely negative except as in the [address]     Past Medical History:   Diagnosis Date    Bipolar 1 disorder (HCC)     Chronic generalized abdominal pain     Depression     GERD (gastroesophageal reflux disease)     IBS (irritable bowel syndrome)     Mental impairment     OCD (obsessive compulsive disorder)     Schizo affective schizophrenia ([facility] [address]      Past Surgical History:   Procedure Laterality Date    COLONOSCOPY      does not know    EGD      does not know     Family History   Problem Relation Age of Onset    Colon Cancer Neg Hx     Esophageal Cancer Neg Hx     Liver Cancer Neg Hx     Rectal Cancer Neg Hx     Stomach Cancer Neg Hx      Social History     Socioeconomic History    Marital status: Unknown     Spouse name: Not on file    Number of children: Not on file    Years of education: Not on file    Highest education level: Not on file   Occupational History    Not on file   Tobacco Use    Smoking status: Never    Smokeless tobacco: Never   Substance and Sexual Activity    Alcohol use: Never    Drug use: Never    Sexual activity: Not on file   Other Topics Concern    Not on file   Social History Narrative    Not on file     Social Determinants of Health     Financial Resource Strain: Not on file   Food Insecurity: Not on file   Transportation Needs: Not on file   Physical Activity: Not on file   Stress: Not on file   Social Connections: Not on file   Intimate Partner Violence: Not on file   Housing Stability: Not on file     No current facility-administered medications for this encounter. Current Outpatient Medications   Medication Sig Dispense Refill    aspirin 81 MG EC tablet Take 81 mg by mouth daily      Fesoterodine Fumarate ER 8 MG TB24 Take 8 mg by mouth every evening       Allergies   Allergen Reactions    Codeine     Iodine        Nursing Notes Reviewed    Physical Exam:  ED Triage Vitals   Enc Vitals Group      BP       Pulse       Resp       Temp       Temp src       SpO2       Weight       Height       Head Circumference       Peak Flow       Pain Score       Pain Loc       Pain Edu? Excl. in [address]? GENERAL APPEARANCE: Awake and alert. Cooperative. No acute distress. HEAD: Normocephalic. Atraumatic. EYES: EOM's grossly intact. Sclera anicteric. ENT: Mucous membranes are moist. Tolerates saliva. No trismus. NECK: Supple. Trachea midline. HEART: RRR. Radial pulses 2+. LUNGS: Respirations unlabored. CTAB  ABDOMEN: Soft. Non-tender. No guarding or rebound. EXTREMITIES: No acute deformities. SKIN: Warm and dry. NEUROLOGICAL: No gross facial drooping. Moves all 4 extremities spontaneously. PSYCHIATRIC: Normal mood. I have reviewed and interpreted all of the currently available lab results from this visit (if applicable):  No results found for this visit on 12/08/22. Radiographs (if obtained):  [] The following radiograph was interpreted by myself in the absence of a radiologist:  [] Radiologist's Report Reviewed:    EKG (if obtained): (All EKG's are interpreted by myself in the absence of a cardiologist)    MDM:  Plan of care is discussed thoroughly with the patient and family if present. If performed, all imaging and lab work also discussed with patient. All relevant prior results and chart reviewed if available.             Patient presents with chronic symptoms and a benign abdominal exam.  Vital signs are normal.  He has had extensive work-ups before in the past.  Do not suspect any acute life-threatening emergent process at this time based on overall presentation and exam.  He is given Tylenol, Maalox and Pepcid in the emergency department and will be discharged home, encouraged close follow-up with PCP. Clinical Impression:  1.  Chronic abdominal pain      (Please note that portions of this note may have been completed with a voice recognition program. Efforts were made to edit the dictations but occasionally words are mis-transcribed.)    MD Vasquez Moe MD  12/09/22 9867

## 2022-12-09 NOTE — ED NOTES
Pt states he has no where to go and its not fait and states he needs to be admitted.         Reymundo Glasgow, RN  12/09/22 0839

## 2022-12-09 NOTE — ED PROVIDER NOTES
Triage Chief Complaint:   Mental Health Problem (States he needs mental health resources and group home placement)    [place]:  Kelsey Enamorado is a 27 y.o. male that presents stating that he wants outpatient resources for mental health. Denies any suicidal ideation. States that he also wants information on places that he can stay. Also requesting a taxi ride out of the city. Denies any acute medical complaints. ROS:  At least 10 systems reviewed and otherwise acutely negative except as in the [address]     Past Medical History:   Diagnosis Date    Bipolar 1 disorder (HCC)     Chronic generalized abdominal pain     Depression     GERD (gastroesophageal reflux disease)     IBS (irritable bowel syndrome)     Mental impairment     OCD (obsessive compulsive disorder)     Schizo affective schizophrenia ([facility] [address]      Past Surgical History:   Procedure Laterality Date    COLONOSCOPY      does not know    EGD      does not know     Family History   Problem Relation Age of Onset    Colon Cancer Neg Hx     Esophageal Cancer Neg Hx     Liver Cancer Neg Hx     Rectal Cancer Neg Hx     Stomach Cancer Neg Hx      Social History     Socioeconomic History    Marital status: Unknown     Spouse name: Not on file    Number of children: Not on file    Years of education: Not on file    Highest education level: Not on file   Occupational History    Not on file   Tobacco Use    Smoking status: Never    Smokeless tobacco: Never   Substance and Sexual Activity    Alcohol use: Never    Drug use: Never    Sexual activity: Not on file   Other Topics Concern    Not on file   Social History Narrative    Not on file     Social Determinants of Health     Financial Resource Strain: Not on file   Food Insecurity: Not on file   Transportation Needs: Not on file   Physical Activity: Not on file   Stress: Not on file   Social Connections: Not on file   Intimate Partner Violence: Not on file   Housing Stability: Not on file     No current facility-administered medications for this encounter. Current Outpatient Medications   Medication Sig Dispense Refill    aspirin 81 MG EC tablet Take 81 mg by mouth daily      Fesoterodine Fumarate ER 8 MG TB24 Take 8 mg by mouth every evening       Allergies   Allergen Reactions    Codeine     Iodine        Nursing Notes Reviewed    Physical Exam:  ED Triage Vitals   Enc Vitals Group      BP       Pulse       Resp       Temp       Temp src       SpO2       Weight       Height       Head Circumference       Peak Flow       Pain Score       Pain Loc       Pain Edu? Excl. in [address]? GENERAL APPEARANCE: Awake and alert. Cooperative. No acute distress. HEAD: Normocephalic. Atraumatic. EYES: EOM's grossly intact. Sclera anicteric. ENT: Mucous membranes are moist. Tolerates saliva. No trismus. NECK: No meningismus. HEART:  Extremities pink  LUNGS: Respirations unlabored. Even chest rise bilaterally  ABDOMEN: Non distended. EXTREMITIES: No acute deformities. SKIN: Dry  NEUROLOGICAL: No gross facial drooping. Moves all 4 extremities spontaneously. PSYCHIATRIC: Normal mood. Denies SI    I have reviewed and interpreted all of the currently available lab results from this visit (if applicable):  No results found for this visit on 12/09/22. Radiographs (if obtained):  [] The following radiograph was interpreted by myself in the absence of a radiologist:  [] Radiologist's Report Reviewed:    EKG (if obtained): (All EKG's are interpreted by myself in the absence of a cardiologist)    MDM:  Plan of care is discussed thoroughly with the patient and family if present. If performed, all imaging and lab work also discussed with patient. All relevant prior results and chart reviewed if available. Patient given requested resources and discharged. No acute medical complaints      Clinical Impression:  1.  Evaluation by medical service required      (Please note that portions of this note may have been completed with a voice recognition program. Efforts were made to edit the dictations but occasionally words are mis-transcribed.)    MD Abdirashid Recinos MD  12/09/22 5964

## 2022-12-09 NOTE — ED NOTES
Patient stated that he doesn't want to be discharged because \"I don't have a place to go, I'm homeless, I don't want to leave\". He then became loud and started screaming \"I don't want to go, call the !!!\".       Rena James RN  12/09/22 9403

## 2022-12-09 NOTE — DISCHARGE INSTRUCTIONS
Resources for Emergency [facility] (emergency housing for families & single women)  [address], [address] [facility]  Phone: [phone]  Fax: [phone] (emergency housing for single men)  [address], [address] [facility] ? Phone: [phone] ? Fax: [phone]    [facility] (permanent supportive housing)  [address], [address] [place] [address] ? Phone: [phone] ? Fax: [phone]    *[facility] Intake Office Hours are Monday through Friday 9:00am-3:30pm.        [facility] of [facility] Assistance  Location: [address] [place] [address]  Hours: Tuesday-Friday 10:30 AM - 12:00 PM  Assistance with lodging, prescriptions, IDs, and work clothing    Emergency Assistance  Phone: [phone]  Calls accepted between 10:00 - 10:30 AM Tuesdays and Fridays. Assistance with rent, utilities and furniture. [facility] Emergency [address]  [address] [facility], [address]   [phone]    Hours:  Mon. 2:30pm - 5:00pm     Services:  Provides emergency food and limited assistance with financial needs including rent,  utilities, and prescriptions  [address] [employer]  [address] [facility], [address] [place] [address]  [email or web address]  [phone]:  Provides food assistance to [facility] residents in need  May also provide hygiene items  May offer assistance with emergency rent   *must have qualifying reason  May offer assistance with utilities (gas/electric) payments   *must have disconnection notice    Hours:  Friday 9:00am - 4:00pm by appointment only   * call during the week for appointment        [facility]  [address] [facility]  [place] [place] [address] [phone]  [email or web address]    Provides neighbors in

## 2022-12-09 NOTE — ED NOTES
Patient sleeping in room had to wake patient up for vital signs        Santa Sheriff RN  12/09/22 0002

## 2022-12-09 NOTE — ED TRIAGE NOTES
Pt to ED via squad. C/o CP \"dull and stabbing\"  Has been ongoing for months. Has been seen at [facility] - no dx given per pt. Looks well. In NAD.

## 2022-12-12 ENCOUNTER — HOSPITAL ENCOUNTER (EMERGENCY)
Age: 32
Discharge: HOME | End: 2022-12-12
Attending: EMERGENCY MEDICINE
Payer: COMMERCIAL

## 2022-12-12 VITALS
DIASTOLIC BLOOD PRESSURE: 85 MMHG | SYSTOLIC BLOOD PRESSURE: 143 MMHG | BODY MASS INDEX: 21.64 KG/M2 | HEIGHT: 63 IN | TEMPERATURE: 98.4 F | HEART RATE: 103 BPM | RESPIRATION RATE: 20 BRPM | OXYGEN SATURATION: 100 % | WEIGHT: 122.13 LBS

## 2022-12-12 DIAGNOSIS — B34.9 VIRAL ILLNESS: Primary | ICD-10-CM

## 2022-12-12 DIAGNOSIS — R11.2 NAUSEA AND VOMITING, UNSPECIFIED VOMITING TYPE: ICD-10-CM

## 2022-12-12 LAB
RAPID INFLUENZA  B AGN: NEGATIVE
RAPID INFLUENZA A AGN: NEGATIVE
SARS-COV-2, NAAT: NOT DETECTED
SOURCE: NORMAL

## 2022-12-12 PROCEDURE — 99284 EMERGENCY DEPT VISIT MOD MDM: CPT

## 2022-12-12 PROCEDURE — 6370000000 HC RX 637 (ALT 250 FOR IP): Performed by: EMERGENCY MEDICINE

## 2022-12-12 PROCEDURE — 87635 SARS-COV-2 COVID-19 AMP PRB: CPT

## 2022-12-12 PROCEDURE — 6360000002 HC RX W HCPCS: Performed by: EMERGENCY MEDICINE

## 2022-12-12 PROCEDURE — 96372 THER/PROPH/DIAG INJ SC/IM: CPT

## 2022-12-12 PROCEDURE — 87804 INFLUENZA ASSAY W/OPTIC: CPT

## 2022-12-12 RX ORDER — ONDANSETRON 4 MG/1
4 TABLET, ORALLY DISINTEGRATING ORAL ONCE
Status: COMPLETED | OUTPATIENT
Start: 2022-12-12 | End: 2022-12-12

## 2022-12-12 RX ORDER — KETOROLAC TROMETHAMINE 30 MG/ML
15 INJECTION, SOLUTION INTRAMUSCULAR; INTRAVENOUS ONCE
Status: COMPLETED | OUTPATIENT
Start: 2022-12-12 | End: 2022-12-12

## 2022-12-12 RX ORDER — DICYCLOMINE HYDROCHLORIDE 10 MG/ML
20 INJECTION INTRAMUSCULAR ONCE
Status: COMPLETED | OUTPATIENT
Start: 2022-12-12 | End: 2022-12-12

## 2022-12-12 RX ADMIN — DICYCLOMINE HYDROCHLORIDE 20 MG: 20 INJECTION INTRAMUSCULAR at 18:54

## 2022-12-12 RX ADMIN — KETOROLAC TROMETHAMINE 15 MG: 30 INJECTION, SOLUTION INTRAMUSCULAR; INTRAVENOUS at 18:54

## 2022-12-12 RX ADMIN — ONDANSETRON 4 MG: 4 TABLET, ORALLY DISINTEGRATING ORAL at 18:54

## 2022-12-12 ASSESSMENT — PAIN SCALES - GENERAL: PAINLEVEL_OUTOF10: 6

## 2022-12-12 NOTE — ED PROVIDER NOTES
Emergency Department Encounter    Patient: Marli Yoder  MRN: [id number]  : 1990  Date of Evaluation: 2022  ED Provider:  Zac Pradhan MD    Triage Chief Complaint:   Emesis    [place]:  Marli Yoder is a 27 y.o. male that presents with complaint of nausea and abdominal cramping, occasional vomiting, he reports that he needs a shot of Bentyl and Toradol, is asking for them by name. He is also had a cough and congestion and generally not feeling well over the last several days. He has chronic abdominal pain, his cramping is similar to his previous episodes. He denies any new pain in his lower abdomen or in his back. No urinary symptoms. He does not know if he has had a fever. He denies any suicidal or homicidal ideation. He is not having hallucinations.     ROS - see HPI, below listed is current ROS at time of my eval:  10 systems reviewed negative except as above    Past Medical History:   Diagnosis Date    Bipolar 1 disorder (HCC)     Chronic generalized abdominal pain     Depression     GERD (gastroesophageal reflux disease)     IBS (irritable bowel syndrome)     Mental impairment     OCD (obsessive compulsive disorder)     Schizo affective schizophrenia ([facility] [address]      Past Surgical History:   Procedure Laterality Date    COLONOSCOPY      does not know    EGD      does not know     Family History   Problem Relation Age of Onset    Colon Cancer Neg Hx     Esophageal Cancer Neg Hx     Liver Cancer Neg Hx     Rectal Cancer Neg Hx     Stomach Cancer Neg Hx      Social History     Socioeconomic History    Marital status: Unknown     Spouse name: Not on file    Number of children: Not on file    Years of education: Not on file    Highest education level: Not on file   Occupational History    Not on file   Tobacco Use    Smoking status: Never    Smokeless tobacco: Never   Substance and Sexual Activity    Alcohol use: Never    Drug use: Never    Sexual activity: Not on file   Other Topics Concern Not on file   Social History Narrative    Not on file     Social Determinants of Health     Financial Resource Strain: Not on file   Food Insecurity: Not on file   Transportation Needs: Not on file   Physical Activity: Not on file   Stress: Not on file   Social Connections: Not on file   Intimate Partner Violence: Not on file   Housing Stability: Not on file     No current facility-administered medications for this encounter. Current Outpatient Medications   Medication Sig Dispense Refill    aspirin 81 MG EC tablet Take 81 mg by mouth daily      Fesoterodine Fumarate ER 8 MG TB24 Take 8 mg by mouth every evening       Allergies   Allergen Reactions    Codeine     Iodine        Nursing Notes Reviewed    Physical Exam:  Triage VS:    ED Triage Vitals [12/12/22 1708]   Enc Vitals Group      BP (!) 143/85      Heart Rate (!) 103      Resp 20      Temp 98.4 °F (36.9 °C)      Temp Source Oral      SpO2 100 %      Weight 122 lb 2 oz (55.4 kg)      Height 5' 3\" (1.6 m)      Head Circumference       Peak Flow       Pain Score       Pain Loc       Pain Edu? Excl. in [address]? My pulse ox interpretation is - normal    General appearance:  No acute distress. Skin:  Warm. Dry. Eye:  Extraocular movements intact. Ears, nose, mouth and throat:  Oral mucosa moist   Neck:  Trachea midline. Extremity:  No swelling. Normal ROM     Heart:  Regular rate and rhythm, normal S1 & S2, no extra heart sounds. Perfusion:  intact  Respiratory:  Lungs clear to auscultation bilaterally. Respirations nonlabored. Abdominal:   Soft. Nontender. Non distended.   Neurological:  Alert and oriented          Psychiatric:  Appropriate    I have reviewed and interpreted all of the currently available lab results from this visit (if applicable):  Results for orders placed or performed during the hospital encounter of 12/12/22   COVID-19, Rapid    Specimen: Nasopharyngeal   Result Value Ref Range    Source UNKNOWN SARS-CoV-2, NAAT NOT DETECTED NOT DETECTED   Rapid Flu Swab    Specimen: Nasopharyngeal   Result Value Ref Range    Rapid Influenza A Ag NEGATIVE NEGATIVE    Rapid Influenza B Ag NEGATIVE NEGATIVE      Radiographs (if obtained):  Radiologist's Report Reviewed:  No results found. EKG (if obtained): (All EKG's are interpreted by myself in the absence of a cardiologist)      MDM:  78-year-old male with history as above presents with concern for abdominal cramping, nausea, follow-up cough and congestion. I asked if he had had any flu or COVID contacts and he is not sure. He was mildly tachycardic in the lobby but not on my evaluation, heart rate of 92 bpm.  I have offered him COVID and flu testing which she would like. He is asking for IM Bentyl and Toradol by me, which I will provide. I also give a dose of Zofran as he did complain of nausea. At this time he is not in any distress with no peritoneal signs on exam, likely has a viral illness on top of his chronic conditions. He is comfortable with this plan for now and we will reassess. Patient up and ambulating around the department, asking for food and sprite, not in distress, will be discharged home. Covid and flu negative. Encouraged to f/u with PCP. Clinical Impression:  1. Viral illness    2. Nausea and vomiting, unspecified vomiting type      Disposition referral (if applicable):  Alexandria Yip  [address] [place] [address]  [phone]        Disposition medications (if applicable):  Discharge Medication List as of 12/12/2022  7:33 PM        ED Provider Disposition Time  DISPOSITION Decision To Discharge 12/12/2022 07:34:01 PM      Comment: Please note this report has been produced using speech recognition software and may contain errors related to that system including errors in grammar, punctuation, and spelling, as well as words and phrases that may be inappropriate. Efforts were made to edit the dictations.        Neha Reyes Josefina Barcenas MD  12/12/22 2019

## 2022-12-18 ENCOUNTER — HOSPITAL ENCOUNTER (EMERGENCY)
Age: 32
Discharge: HOME | End: 2022-12-18
Payer: COMMERCIAL

## 2022-12-18 VITALS
OXYGEN SATURATION: 96 % | WEIGHT: 122 LBS | HEART RATE: 91 BPM | HEIGHT: 63 IN | RESPIRATION RATE: 19 BRPM | DIASTOLIC BLOOD PRESSURE: 61 MMHG | BODY MASS INDEX: 21.62 KG/M2 | SYSTOLIC BLOOD PRESSURE: 97 MMHG | TEMPERATURE: 98.1 F

## 2022-12-18 DIAGNOSIS — G89.29 CHRONIC ABDOMINAL PAIN: Primary | ICD-10-CM

## 2022-12-18 DIAGNOSIS — R10.9 CHRONIC ABDOMINAL PAIN: Primary | ICD-10-CM

## 2022-12-18 LAB
ALBUMIN SERPL-MCNC: 4.1 GM/DL (ref 3.4–5)
ALP BLD-CCNC: 74 IU/L (ref 40–129)
ALT SERPL-CCNC: 12 U/L (ref 10–40)
ANION GAP SERPL CALCULATED.3IONS-SCNC: 11 MMOL/L (ref 4–16)
AST SERPL-CCNC: 14 IU/L (ref 15–37)
BASOPHILS ABSOLUTE: 0 K/CU MM
BASOPHILS RELATIVE PERCENT: 0.1 % (ref 0–1)
BILIRUB SERPL-MCNC: 0.2 MG/DL (ref 0–1)
BUN BLDV-MCNC: 18 MG/DL (ref 6–23)
CALCIUM SERPL-MCNC: 9 MG/DL (ref 8.3–10.6)
CHLORIDE BLD-SCNC: 100 MMOL/L (ref 99–110)
CO2: 29 MMOL/L (ref 21–32)
CREAT SERPL-MCNC: 1 MG/DL (ref 0.9–1.3)
DIFFERENTIAL TYPE: ABNORMAL
EOSINOPHILS ABSOLUTE: 0.1 K/CU MM
EOSINOPHILS RELATIVE PERCENT: 1.4 % (ref 0–3)
GFR SERPL CREATININE-BSD FRML MDRD: >60 ML/MIN/1.73M2
GLUCOSE BLD-MCNC: 131 MG/DL (ref 70–99)
HCT VFR BLD CALC: 40.7 % (ref 42–52)
HEMOGLOBIN: 13.4 GM/DL (ref 13.5–18)
IMMATURE NEUTROPHIL %: 0.7 % (ref 0–0.43)
LIPASE: 50 IU/L (ref 13–60)
LYMPHOCYTES ABSOLUTE: 2.4 K/CU MM
LYMPHOCYTES RELATIVE PERCENT: 31.1 % (ref 24–44)
MCH RBC QN AUTO: 31.2 PG (ref 27–31)
MCHC RBC AUTO-ENTMCNC: 32.9 % (ref 32–36)
MCV RBC AUTO: 94.7 FL (ref 78–100)
MONOCYTES ABSOLUTE: 0.8 K/CU MM
MONOCYTES RELATIVE PERCENT: 9.8 % (ref 0–4)
NUCLEATED RBC %: 0 %
PDW BLD-RTO: 12 % (ref 11.7–14.9)
PLATELET # BLD: 246 K/CU MM (ref 140–440)
PMV BLD AUTO: 9.7 FL (ref 7.5–11.1)
POTASSIUM SERPL-SCNC: 3.8 MMOL/L (ref 3.5–5.1)
RBC # BLD: 4.3 M/CU MM (ref 4.6–6.2)
SEGMENTED NEUTROPHILS ABSOLUTE COUNT: 4.4 K/CU MM
SEGMENTED NEUTROPHILS RELATIVE PERCENT: 56.9 % (ref 36–66)
SODIUM BLD-SCNC: 140 MMOL/L (ref 135–145)
TOTAL IMMATURE NEUTOROPHIL: 0.05 K/CU MM
TOTAL NUCLEATED RBC: 0 K/CU MM
TOTAL PROTEIN: 7.2 GM/DL (ref 6.4–8.2)
WBC # BLD: 7.7 K/CU MM (ref 4–10.5)

## 2022-12-18 PROCEDURE — 6360000002 HC RX W HCPCS: Performed by: NURSE PRACTITIONER

## 2022-12-18 PROCEDURE — 80053 COMPREHEN METABOLIC PANEL: CPT

## 2022-12-18 PROCEDURE — 96372 THER/PROPH/DIAG INJ SC/IM: CPT

## 2022-12-18 PROCEDURE — 85025 COMPLETE CBC W/AUTO DIFF WBC: CPT

## 2022-12-18 PROCEDURE — 83690 ASSAY OF LIPASE: CPT

## 2022-12-18 PROCEDURE — 99284 EMERGENCY DEPT VISIT MOD MDM: CPT

## 2022-12-18 RX ORDER — KETOROLAC TROMETHAMINE 30 MG/ML
30 INJECTION, SOLUTION INTRAMUSCULAR; INTRAVENOUS ONCE
Status: COMPLETED | OUTPATIENT
Start: 2022-12-18 | End: 2022-12-18

## 2022-12-18 RX ORDER — DICYCLOMINE HYDROCHLORIDE 10 MG/ML
20 INJECTION INTRAMUSCULAR ONCE
Status: COMPLETED | OUTPATIENT
Start: 2022-12-18 | End: 2022-12-18

## 2022-12-18 RX ADMIN — KETOROLAC TROMETHAMINE 30 MG: 30 INJECTION, SOLUTION INTRAMUSCULAR; INTRAVENOUS at 20:11

## 2022-12-18 RX ADMIN — DICYCLOMINE HYDROCHLORIDE 20 MG: 20 INJECTION INTRAMUSCULAR at 20:11

## 2022-12-19 ENCOUNTER — HOSPITAL ENCOUNTER (EMERGENCY)
Age: 32
Discharge: HOME | End: 2022-12-19
Payer: COMMERCIAL

## 2022-12-19 ENCOUNTER — APPOINTMENT (OUTPATIENT)
Dept: CT IMAGING | Age: 32
End: 2022-12-19
Payer: COMMERCIAL

## 2022-12-19 VITALS
RESPIRATION RATE: 22 BRPM | BODY MASS INDEX: 21.62 KG/M2 | TEMPERATURE: 98.4 F | HEIGHT: 63 IN | WEIGHT: 122 LBS | SYSTOLIC BLOOD PRESSURE: 127 MMHG | HEART RATE: 99 BPM | OXYGEN SATURATION: 95 % | DIASTOLIC BLOOD PRESSURE: 87 MMHG

## 2022-12-19 DIAGNOSIS — R10.9 CHRONIC ABDOMINAL PAIN: Primary | ICD-10-CM

## 2022-12-19 DIAGNOSIS — K62.3 RECTAL PROLAPSE: ICD-10-CM

## 2022-12-19 DIAGNOSIS — G89.29 CHRONIC ABDOMINAL PAIN: Primary | ICD-10-CM

## 2022-12-19 LAB
ALBUMIN SERPL-MCNC: 4.2 GM/DL (ref 3.4–5)
ALP BLD-CCNC: 71 IU/L (ref 40–129)
ALT SERPL-CCNC: 14 U/L (ref 10–40)
ANION GAP SERPL CALCULATED.3IONS-SCNC: 9 MMOL/L (ref 4–16)
AST SERPL-CCNC: 29 IU/L (ref 15–37)
BASOPHILS ABSOLUTE: 0 K/CU MM
BASOPHILS RELATIVE PERCENT: 0.1 % (ref 0–1)
BILIRUB SERPL-MCNC: 0.1 MG/DL (ref 0–1)
BILIRUBIN URINE: NEGATIVE MG/DL
BLOOD, URINE: NEGATIVE
BUN BLDV-MCNC: 23 MG/DL (ref 6–23)
CALCIUM SERPL-MCNC: 8.9 MG/DL (ref 8.3–10.6)
CHLORIDE BLD-SCNC: 103 MMOL/L (ref 99–110)
CLARITY: CLEAR
CO2: 30 MMOL/L (ref 21–32)
COLOR: YELLOW
COMMENT UA: NORMAL
CREAT SERPL-MCNC: 0.9 MG/DL (ref 0.9–1.3)
DIFFERENTIAL TYPE: ABNORMAL
EOSINOPHILS ABSOLUTE: 0.2 K/CU MM
EOSINOPHILS RELATIVE PERCENT: 1.9 % (ref 0–3)
GFR SERPL CREATININE-BSD FRML MDRD: >60 ML/MIN/1.73M2
GLUCOSE BLD-MCNC: 126 MG/DL (ref 70–99)
GLUCOSE, URINE: NEGATIVE MG/DL
HCT VFR BLD CALC: 39.8 % (ref 42–52)
HEMOGLOBIN: 13 GM/DL (ref 13.5–18)
IMMATURE NEUTROPHIL %: 0.5 % (ref 0–0.43)
KETONES, URINE: NEGATIVE MG/DL
LACTATE: 1.3 MMOL/L (ref 0.4–2)
LEUKOCYTE ESTERASE, URINE: NEGATIVE
LIPASE: 41 IU/L (ref 13–60)
LYMPHOCYTES ABSOLUTE: 2.1 K/CU MM
LYMPHOCYTES RELATIVE PERCENT: 27.1 % (ref 24–44)
MCH RBC QN AUTO: 31 PG (ref 27–31)
MCHC RBC AUTO-ENTMCNC: 32.7 % (ref 32–36)
MCV RBC AUTO: 94.8 FL (ref 78–100)
MONOCYTES ABSOLUTE: 0.9 K/CU MM
MONOCYTES RELATIVE PERCENT: 10.8 % (ref 0–4)
NITRITE URINE, QUANTITATIVE: NEGATIVE
NUCLEATED RBC %: 0 %
PDW BLD-RTO: 12.2 % (ref 11.7–14.9)
PH, URINE: 6.5 (ref 5–8)
PLATELET # BLD: 235 K/CU MM (ref 140–440)
PMV BLD AUTO: 9.9 FL (ref 7.5–11.1)
POTASSIUM SERPL-SCNC: 4 MMOL/L (ref 3.5–5.1)
PROTEIN UA: NEGATIVE MG/DL
RBC # BLD: 4.2 M/CU MM (ref 4.6–6.2)
SEGMENTED NEUTROPHILS ABSOLUTE COUNT: 4.7 K/CU MM
SEGMENTED NEUTROPHILS RELATIVE PERCENT: 59.6 % (ref 36–66)
SODIUM BLD-SCNC: 142 MMOL/L (ref 135–145)
SPECIFIC GRAVITY UA: 1.02 (ref 1–1.03)
TOTAL IMMATURE NEUTOROPHIL: 0.04 K/CU MM
TOTAL NUCLEATED RBC: 0 K/CU MM
TOTAL PROTEIN: 7.1 GM/DL (ref 6.4–8.2)
UROBILINOGEN, URINE: 1 MG/DL (ref 0.2–1)
WBC # BLD: 7.9 K/CU MM (ref 4–10.5)

## 2022-12-19 PROCEDURE — 96372 THER/PROPH/DIAG INJ SC/IM: CPT

## 2022-12-19 PROCEDURE — 83690 ASSAY OF LIPASE: CPT

## 2022-12-19 PROCEDURE — 99284 EMERGENCY DEPT VISIT MOD MDM: CPT

## 2022-12-19 PROCEDURE — 74176 CT ABD & PELVIS W/O CONTRAST: CPT

## 2022-12-19 PROCEDURE — 85025 COMPLETE CBC W/AUTO DIFF WBC: CPT

## 2022-12-19 PROCEDURE — 6360000002 HC RX W HCPCS: Performed by: PHYSICIAN ASSISTANT

## 2022-12-19 PROCEDURE — 83605 ASSAY OF LACTIC ACID: CPT

## 2022-12-19 PROCEDURE — 81003 URINALYSIS AUTO W/O SCOPE: CPT

## 2022-12-19 PROCEDURE — 80053 COMPREHEN METABOLIC PANEL: CPT

## 2022-12-19 RX ORDER — DICYCLOMINE HYDROCHLORIDE 10 MG/ML
20 INJECTION INTRAMUSCULAR ONCE
Status: COMPLETED | OUTPATIENT
Start: 2022-12-19 | End: 2022-12-19

## 2022-12-19 RX ORDER — KETOROLAC TROMETHAMINE 30 MG/ML
30 INJECTION, SOLUTION INTRAMUSCULAR; INTRAVENOUS ONCE
Status: COMPLETED | OUTPATIENT
Start: 2022-12-19 | End: 2022-12-19

## 2022-12-19 RX ADMIN — DICYCLOMINE HYDROCHLORIDE 20 MG: 20 INJECTION INTRAMUSCULAR at 19:16

## 2022-12-19 RX ADMIN — KETOROLAC TROMETHAMINE 30 MG: 30 INJECTION, SOLUTION INTRAMUSCULAR; INTRAVENOUS at 19:15

## 2022-12-19 ASSESSMENT — PAIN - FUNCTIONAL ASSESSMENT: PAIN_FUNCTIONAL_ASSESSMENT: NONE - DENIES PAIN

## 2022-12-19 NOTE — ED NOTES
Pt refused to sign AVS or take AVS with him. Pt left department without issue and stead gait.       Scot Madden RN  12/18/22 5092

## 2022-12-19 NOTE — DISCHARGE INSTRUCTIONS
Your laboratory studies today were normal.  You need to call and schedule outpatient follow up with gastroenterology for your ongoing abdominal pain and acid reflux symptoms and general surgery for the reported recurrent rectal prolapse. Contact the number on your Medicaid card for further information about arranging rides to your appointment.

## 2022-12-19 NOTE — ED PROVIDER NOTES
[address] ENCOUNTER        Pt Name: Rosanne Melara  MRN: [id number]  [place] 1990  Date of evaluation: 12/18/2022  Provider: ROSE Shirley CNP  PCP: Jaylene Rosenberg     I have discussed the care of this patient with my supervising physician Dr. Paul Velazquez who is in agreement with the evaluation and plan of care. Triage CHIEF COMPLAINT       Chief Complaint   Patient presents with    Abdominal Pain    Chest Pain         HISTORY OF PRESENT ILLNESS      Chief Complaint: abdominal pain, chest pain    Rosanne Melara is a 27 y.o. male who presents with chronic abdominal pain. He states that he has persistent recurrent mid abdominal pain with reflux symptoms that burning to his esophagus. He also states he has recurrent prolapsed rectum that frequently occurs when he has to strain to have a bowel movement. He denies any recent constipation and states he had diarrhea earlier today. He denies any rectal prolapse at present. He denies any change in his abdominal pain today from his ongoing abdominal pain. He is here requesting IV Bentyl and Toradol as he states this is the only thing that improves his discomfort. He does not follow with gastroenterology or general surgery as an outpatient for these conditions. He has seen primary care and reports \"that they do not do anything\" to help me. Reports frequent nausea but denies any vomiting. Denies any urinary changes. Nursing Notes were all reviewed and agreed with or any disagreements were addressed in the HPI.     REVIEW OF SYSTEMS     Constitutional:   Denies fever, chills, weight loss or weakness   HENT:   Denies sore throat  Cardiovascular:  Denies chest pain, palpitations   Respiratory:  Denies cough or shortness of breath    GI: See HPI  :  Denies any urinary symptoms     PAST MEDICAL HISTORY     Past Medical History:   Diagnosis Date Bipolar 1 disorder (HCC)     Chronic generalized abdominal pain     Depression     GERD (gastroesophageal reflux disease)     IBS (irritable bowel syndrome)     Mental impairment     OCD (obsessive compulsive disorder)     Schizo affective schizophrenia ([facility] [address]        SURGICAL HISTORY     Past Surgical History:   Procedure Laterality Date    COLONOSCOPY      does not know    EGD      does not know       CURRENTMEDICATIONS       Discharge Medication List as of 12/18/2022  8:55 PM        CONTINUE these medications which have NOT CHANGED    Details   aspirin 81 MG EC tablet Take 81 mg by mouth dailyHistorical Med      Fesoterodine Fumarate ER 8 MG TB24 Take 8 mg by mouth every eveningHistorical Med             ALLERGIES     Codeine and Iodine    FAMILYHISTORY       Family History   Problem Relation Age of Onset    Colon Cancer Neg Hx     Esophageal Cancer Neg Hx     Liver Cancer Neg Hx     Rectal Cancer Neg Hx     Stomach Cancer Neg Hx         SOCIAL HISTORY       Social History     Socioeconomic History    Marital status: Unknown   Tobacco Use    Smoking status: Never    Smokeless tobacco: Never   Substance and Sexual Activity    Alcohol use: Never    Drug use: Never       SCREENINGS           PHYSICAL EXAM       ED Triage Vitals [12/18/22 1914]   BP Temp Temp Source Heart Rate Resp SpO2 Height Weight   128/76 98.1 °F (36.7 °C) Oral (!) 108 18 98 % 5' 3\" (1.6 m) 122 lb (55.3 kg)      Constitutional:  Well developed, Well nourished. No distress  HENT:  Normocephalic, Atraumatic. Moist mucus membranes. Cardiovascular:   RRR,  no murmurs/rubs/gallops. Distal cap refill and pulses intact bilateral upper and lower extremities. no peripheral edema. Respiratory:   Nonlabored breathing. Normal breath sounds, No wheezing  Abdomen: Bowel sounds normal, Soft, no masses. Mild diffuse lower abdominal tenderness without rigidity, rebound, or guarding. No CVA tenderness.   Musculoskeletal:  Bilateral upper and lower extremity ROM intact without pain or obvious deficit  Integument:   Warm, Dry, No rashes. Neurologic:  Alert & oriented , No focal deficits noted. Psychiatric:  Affect normal, Mood normal.     DIAGNOSTIC RESULTS   LABS:    Labs Reviewed   CBC WITH AUTO DIFFERENTIAL - Abnormal; Notable for the following components:       Result Value    RBC 4.30 (*)     Hemoglobin 13.4 (*)     Hematocrit 40.7 (*)     MCH 31.2 (*)     Monocytes % 9.8 (*)     Immature Neutrophil % 0.7 (*)     All other components within normal limits   COMPREHENSIVE METABOLIC PANEL - Abnormal; Notable for the following components:    Glucose 131 (*)     AST 14 (*)     All other components within normal limits   LIPASE       When ordered, only abnormal lab results are displayed. All other labs were within normal range or not returned as of this dictation. EKG: When ordered, EKG's are interpreted by the Emergency Department Physician in the absence of a cardiologist.  Please see their note for interpretation of EKG. RADIOLOGY:   Non-plain film images such as CT, Ultrasound and MRI are read by the radiologist. Plain radiographic images are visualized and preliminarily interpreted by the  ED Provider with the below findings:    Interpretation perthe Radiologist below, if available at the time of this note:    No orders to display     No results found.       PROCEDURES   Unless otherwise noted below, none       CRITICAL CARE   CRITICAL CARE NOTE:   N/A    CONSULTS:  None      EMERGENCY DEPARTMENT COURSE and MDM:   Vitals:    Vitals:    12/18/22 1914 12/18/22 1928 12/18/22 2054   BP: 128/76 113/82 97/61   Pulse: (!) 108 (!) 103 91   Resp: 18 26 19   Temp: 98.1 °F (36.7 °C)     TempSrc: Oral     SpO2: 98% 95% 96%   Weight: 122 lb (55.3 kg)     Height: 5' 3\" (1.6 m)         Patient was given thefollowing medications:  Medications   dicyclomine (BENTYL) injection 20 mg (20 mg IntraMUSCular Given 12/18/22 2011)   ketorolac (TORADOL) injection 30 mg (30 mg IntraMUSCular Given 12/18/22 2011)         [unfilled]    [facility]:  Patient presents as above. Emergent etiologies considered. Patient seen and examined. Work-up initiated secondary to presentation, physical exam findings, vital signs and medical chart review. Lee Ann Hodgson CNP, am the primary clinician of record. In brief, patient presents for evaluation of chronic abdominal pain. He denies any changes from his chronic symptoms during his visit today. I reviewed his record which includes records from [facility] and the patient has been to the emergency department 20 times in the last 6 weeks for the same symptoms. He has had normal CT of the abdomen and pelvis multiple times with most recent being at the end of September. His abdomen is soft and mildly tender across the lower abdomen. There is no focal tenderness concerning for acute pathology. There is no palpable mass. He denied any current rectal prolapse. He was requesting IV Bentyl and Toradol as he states he threw the only things that help his pain. These were administered. Laboratory studies including a CMP, CBC, and lipase were all unremarkable. 2102:  Reviewed results with patient. Patient was given information for outpatient follow-up with gastroenterology as well as general surgery. He was advised to contact the phone number on his Medicaid card to inquire about transportation as he expressed issues with obtaining transportation to appointments. Patient expressed frustration that these conditions were not being taken care of in the emergency department and demanded that he be admitted for further evaluation. He states that his insurance allows him to be admitted. Advised him that he does not have any conditions that warrant inpatient admission at this time and reinforced the fact that he needs to follow-up with GI and general surgery as an outpatient.   Explained to him that we are unable to treat these chronic

## 2022-12-20 NOTE — ED PROVIDER NOTES
[address] ENCOUNTER        Pt Name: Que Alfonso  MRN: [id number]  [place] 1990  Date of evaluation: 12/19/2022  Provider: WENDY Wagoner  PCP: Daren EDGAR. I have evaluated this patient. My supervising physician was available for consultation. Triage CHIEF COMPLAINT       Chief Complaint   Patient presents with    Abdominal Pain     States nobody has been helping him with his abd pain today. Rectal Bleeding     States prolapsed rectum with some bleeding and discomfort     HISTORY OF PRESENT ILLNESS      Chief Complaint: Chronic abdominal pain, rectal prolapse    Que Alfonso is a 27 y.o. male who presents to the emergency department today complaining of chronic abdominal pain, also states that he has had a prolapsed rectum that he is able to reduce on his own is causing bleeding discomfort. He states he has been seen and evaluated several times and \"no one is taking care of the abdominal pain\". Patient has a history of malingering he has had over 20+ ER evaluations over the last month through [address] [place] [address]. He states that he is in [facility] after being brought here by [employer] police. Unclear of the etiology of that he states he is staying at a homeless shelter at the moment. Nursing Notes were all reviewed and agreed with or any disagreements were addressed in the HPI. REVIEW OF SYSTEMS     Constitutional:   Denies fever, chills, weight loss or weakness   HENT:   Denies sore throat or ear pain   Cardiovascular:  Denies chest pain, palpitations   Respiratory:  Denies cough or shortness of breath    GI: See HPI  :  Denies any urinary symptoms. .   Musculoskeletal:   Denies back pain  Skin:   Denies rash  Neurologic:   Denies headache, focal weakness or sensory changes   Endocrine:  Denies polyuria or polydypsia   Lymphatic:  Denies swollen glands     PAST MEDICAL HISTORY     Past Medical History:   Diagnosis Date    Bipolar 1 disorder ([facility] [address]     Chronic generalized abdominal pain     Depression     GERD (gastroesophageal reflux disease)     IBS (irritable bowel syndrome)     Mental impairment     OCD (obsessive compulsive disorder)     Schizo affective schizophrenia ([facility]        SURGICAL HISTORY     Past Surgical History:   Procedure Laterality Date    COLONOSCOPY      does not know    EGD      does not know       CURRENTMEDICATIONS       Discharge Medication List as of 12/19/2022  8:04 PM        CONTINUE these medications which have NOT CHANGED    Details   aspirin 81 MG EC tablet Take 81 mg by mouth dailyHistorical Med      Fesoterodine Fumarate ER 8 MG TB24 Take 8 mg by mouth every eveningHistorical Med             ALLERGIES     Codeine and Iodine    FAMILYHISTORY       Family History   Problem Relation Age of Onset    Colon Cancer Neg Hx     Esophageal Cancer Neg Hx     Liver Cancer Neg Hx     Rectal Cancer Neg Hx     Stomach Cancer Neg Hx         SOCIAL HISTORY       Social History     Socioeconomic History    Marital status: Unknown   Tobacco Use    Smoking status: Never    Smokeless tobacco: Never   Substance and Sexual Activity    Alcohol use: Never    Drug use: Never       SCREENINGS           PHYSICAL EXAM       ED Triage Vitals   BP Temp Temp src Heart Rate Resp SpO2 Height Weight   12/19/22 1823 12/19/22 1823 -- 12/19/22 1823 12/19/22 1823 12/19/22 1823 12/19/22 1931 12/19/22 1931   103/76 98.4 °F (36.9 °C)  99 22 100 % 5' 3\" (1.6 m) 122 lb (55.3 kg)      Constitutional:  Well developed, Well nourished. No distress  HENT:  Normocephalic, Atraumatic, PERRL. EOMI. Sclera clear. Conjunctiva normal, No discharge. Neck/Lymphatics: supple, no JVD, no swollen nodes  Cardiovascular:   RRR, no murmurs/rubs/gallops. Respiratory:   Nonlabored breathing. Normal breath sounds, No wheezing  Abdomen:   Bowel sounds normal, Soft, No tenderness, no masses. There is evidence of prolapsed rectum but he is able to reduce it. No rectal pain. Musculoskeletal:    There is no edema, asymmetry, or calf / thigh tenderness bilaterally. No cyanosis. No cool or pale-appearing limb. Distal cap refill and pulses intact bilateral upper and lower extremities  Bilateral upper and lower extremity ROM intact without pain or obvious deficit  Integument:   Warm, Dry  Neurologic:  Alert & oriented , No focal deficits noted. Cranial nerves II through XII grossly intact. Normal gross motor coordination & motor strength bilateral upper and lower extremities  Sensation intact. Psychiatric:  Affect normal, Mood normal.     DIAGNOSTIC RESULTS   LABS:    Labs Reviewed   CBC WITH AUTO DIFFERENTIAL - Abnormal; Notable for the following components:       Result Value    RBC 4.20 (*)     Hemoglobin 13.0 (*)     Hematocrit 39.8 (*)     Monocytes % 10.8 (*)     Immature Neutrophil % 0.5 (*)     All other components within normal limits   COMPREHENSIVE METABOLIC PANEL - Abnormal; Notable for the following components:    Glucose 126 (*)     All other components within normal limits   LIPASE   LACTIC ACID   URINALYSIS       When ordered, only abnormal lab results are displayed. All other labs were within normal range or not returned as of this dictation. EKG: When ordered, EKG's are interpreted by the Emergency Department Physician in the absence of a cardiologist.  Please see their note for interpretation of EKG. RADIOLOGY:   Non-plain film images such as CT, Ultrasound and MRI are read by the radiologist. Plain radiographic images are visualized and preliminarily interpreted by the  ED Provider with the below findings:    Interpretation perthe Radiologist below, if available at the time of this note:    CT ABDOMEN PELVIS WO CONTRAST Additional Contrast? None   Final Result   1. No acute intra-abdominal or pelvic abnormality with limitations for a   noncontrast CT scan. CT ABDOMEN PELVIS WO CONTRAST Additional Contrast? None    Result Date: 12/19/2022  EXAMINATION: CT OF THE ABDOMEN AND PELVIS WITHOUT CONTRAST 12/19/2022 7:28 pm TECHNIQUE: CT of the abdomen and pelvis was performed without the administration of intravenous contrast. Multiplanar reformatted images are provided for review. Automated exposure control, iterative reconstruction, and/or weight based adjustment of the mA/kV was utilized to reduce the radiation dose to as low as reasonably achievable. COMPARISON: None. HISTORY: ORDERING SYSTEM PROVIDED HISTORY: abdominal pain, recurrent rectal prolapse TECHNOLOGIST PROVIDED HISTORY: Reason for exam:->abdominal pain, recurrent rectal prolapse Additional Contrast?->None Decision Support Exception - unselect if not a suspected or confirmed emergency medical condition->Emergency Medical Condition (MA) Reason for Exam: abdominal pain, recurrent rectal prolapse Additional signs and symptoms: no Relevant Medical/Surgical History: none FINDINGS: Lower Chest: The lung bases are clear. Organs: The liver, spleen, pancreas and adrenal glands appear unremarkable for a non contrasted study. The kidneys demonstrate no calcifications. No hydronephrosis is seen. No ureteral or bladder calculi are seen. GI/Bowel: Evaluation of the bowel is limited as no enteric contrast was given. No dilated loops of bowel are seen. I do not see a dilated appendix. Pelvis: No pelvic masses or fluid collections are seen. Peritoneum/Retroperitoneum: The abdominal aorta is not aneurysmal.  There are shotty mesenteric and retroperitoneal lymph nodes but no lymphadenopathy is seen. Bones/Soft Tissues: No acute bony abnormalities are noted. 1. No acute intra-abdominal or pelvic abnormality with limitations for a noncontrast CT scan.          PROCEDURES   Unless otherwise noted below, none     CRITICAL CARE   CRITICAL CARE NOTE:   N/A    CONSULTS:  None      EMERGENCY DEPARTMENT COURSE and MDM: Vitals:    Vitals:    12/19/22 1823 12/19/22 1830 12/19/22 1931   BP: 103/76 127/87    Pulse: 99     Resp: 22     Temp: 98.4 °F (36.9 °C)     SpO2: 100% 95%    Weight:   122 lb (55.3 kg)   Height:   5' 3\" (1.6 m)       Patient was given thefollowing medications:  Medications   ketorolac (TORADOL) injection 30 mg (30 mg IntraMUSCular Given 12/19/22 1915)   dicyclomine (BENTYL) injection 20 mg (20 mg IntraMUSCular Given 12/19/22 1916)         Is this patient to be included in the SEP-1 Core Measure due to severe sepsis or septic shock? No   Exclusion criteria - the patient is NOT to be included for SEP-1 Core Measure due to:  2+ SIRS criteria are not met    MDM:  Patient presents as above. Emergent etiologies considered. Patient seen and examined. Work-up initiated secondary to presentation, physical exam findings, vital signs and medical chart review. In brief, 26-year-old male presented emerged part today with a chronic abdominal pain, rectal prolapse. Patient is well-known to the emergency department and the surrounding 80s, has had history of malingering. Patient states that he has had chronic abdominal pain, has had history of rectal prolapse, he states he is having worsening pain requesting Bentyl and Toradol by name. He staying at a homeless shelter. He has been seen recently over the last several days. Has not had a work-up, my first time seeing, he did call me to the bathroom and did have obvious signs of a rectal prolapse. He was able to reduce this by the time I reevaluated back in the room. His abdominal exam is benign. Has a negative work-up including CT imaging. Patient was given Bentyl and Toradol, will be discharged with general surgery follow-up. Will discharge home in stable condition     CLINICAL IMPRESSION      1. Chronic abdominal pain    2.  Rectal prolapse          DISPOSITION/PLAN   DISPOSITION Decision To Discharge 12/19/2022 07:56:30 PM      PATIENT REFERREDTO:  Carmel Botello Usman Gregg MD  [address]  [phone]    Schedule an appointment as soon as possible for a visit         DISCHARGE MEDICATIONS:  Discharge Medication List as of 12/19/2022  8:04 PM          DISCONTINUED MEDICATIONS:  Discharge Medication List as of 12/19/2022  8:04 PM        (Please note that portions ofthis note were completed with a voice recognition program.  Efforts were made to edit the dictations but occasionally words are mis-transcribed. )    WENDY Miguel (electronically signed)            WENDY Garcia  12/19/22 3962

## 2022-12-20 NOTE — ED NOTES
Discharge instructions reviewed with patient and all questions addressed. Patient alert and oriented x4 at time of discharge. Patient ambulatory and steady gait.       Lupe Medrano RN  12/19/22 2007

## 2022-12-24 ENCOUNTER — APPOINTMENT (OUTPATIENT)
Dept: GENERAL RADIOLOGY | Age: 32
End: 2022-12-24
Payer: COMMERCIAL

## 2022-12-24 ENCOUNTER — HOSPITAL ENCOUNTER (EMERGENCY)
Age: 32
Discharge: HOME | End: 2022-12-25
Attending: EMERGENCY MEDICINE
Payer: COMMERCIAL

## 2022-12-24 DIAGNOSIS — R07.9 CHRONIC CHEST PAIN: ICD-10-CM

## 2022-12-24 DIAGNOSIS — R10.9 CHRONIC ABDOMINAL PAIN: Primary | ICD-10-CM

## 2022-12-24 DIAGNOSIS — G89.29 CHRONIC CHEST PAIN: ICD-10-CM

## 2022-12-24 DIAGNOSIS — G89.29 CHRONIC ABDOMINAL PAIN: Primary | ICD-10-CM

## 2022-12-24 LAB
ALBUMIN SERPL-MCNC: 4.7 GM/DL (ref 3.4–5)
ALP BLD-CCNC: 80 IU/L (ref 40–129)
ALT SERPL-CCNC: 14 U/L (ref 10–40)
ANION GAP SERPL CALCULATED.3IONS-SCNC: 10 MMOL/L (ref 4–16)
AST SERPL-CCNC: 16 IU/L (ref 15–37)
BASOPHILS ABSOLUTE: 0 K/CU MM
BASOPHILS RELATIVE PERCENT: 0.3 % (ref 0–1)
BILIRUB SERPL-MCNC: 0.2 MG/DL (ref 0–1)
BILIRUBIN URINE: NEGATIVE MG/DL
BLOOD, URINE: NEGATIVE
BUN BLDV-MCNC: 24 MG/DL (ref 6–23)
CALCIUM SERPL-MCNC: 9.6 MG/DL (ref 8.3–10.6)
CHLORIDE BLD-SCNC: 98 MMOL/L (ref 99–110)
CLARITY: CLEAR
CO2: 32 MMOL/L (ref 21–32)
COLOR: YELLOW
COMMENT UA: NORMAL
CREAT SERPL-MCNC: 0.8 MG/DL (ref 0.9–1.3)
DIFFERENTIAL TYPE: ABNORMAL
EOSINOPHILS ABSOLUTE: 0.2 K/CU MM
EOSINOPHILS RELATIVE PERCENT: 2.7 % (ref 0–3)
GFR SERPL CREATININE-BSD FRML MDRD: >60 ML/MIN/1.73M2
GLUCOSE BLD-MCNC: 93 MG/DL (ref 70–99)
GLUCOSE, URINE: NEGATIVE MG/DL
HCT VFR BLD CALC: 43.2 % (ref 42–52)
HEMOGLOBIN: 14 GM/DL (ref 13.5–18)
IMMATURE NEUTROPHIL %: 0.4 % (ref 0–0.43)
KETONES, URINE: NEGATIVE MG/DL
LACTATE: 1.2 MMOL/L (ref 0.5–1.9)
LEUKOCYTE ESTERASE, URINE: NEGATIVE
LIPASE: 49 IU/L (ref 13–60)
LYMPHOCYTES ABSOLUTE: 1.7 K/CU MM
LYMPHOCYTES RELATIVE PERCENT: 18.5 % (ref 24–44)
MCH RBC QN AUTO: 31.3 PG (ref 27–31)
MCHC RBC AUTO-ENTMCNC: 32.4 % (ref 32–36)
MCV RBC AUTO: 96.6 FL (ref 78–100)
MONOCYTES ABSOLUTE: 0.7 K/CU MM
MONOCYTES RELATIVE PERCENT: 7.5 % (ref 0–4)
NITRITE URINE, QUANTITATIVE: NEGATIVE
NUCLEATED RBC %: 0 %
PDW BLD-RTO: 12.2 % (ref 11.7–14.9)
PH, URINE: 6 (ref 5–8)
PLATELET # BLD: 218 K/CU MM (ref 140–440)
PMV BLD AUTO: 9.6 FL (ref 7.5–11.1)
POTASSIUM SERPL-SCNC: 4.6 MMOL/L (ref 3.5–5.1)
PROTEIN UA: NEGATIVE MG/DL
RBC # BLD: 4.47 M/CU MM (ref 4.6–6.2)
SEGMENTED NEUTROPHILS ABSOLUTE COUNT: 6.3 K/CU MM
SEGMENTED NEUTROPHILS RELATIVE PERCENT: 70.6 % (ref 36–66)
SODIUM BLD-SCNC: 140 MMOL/L (ref 135–145)
SPECIFIC GRAVITY UA: 1.02 (ref 1–1.03)
TOTAL IMMATURE NEUTOROPHIL: 0.04 K/CU MM
TOTAL NUCLEATED RBC: 0 K/CU MM
TOTAL PROTEIN: 7.9 GM/DL (ref 6.4–8.2)
UROBILINOGEN, URINE: 0.2 MG/DL (ref 0.2–1)
WBC # BLD: 9 K/CU MM (ref 4–10.5)

## 2022-12-24 PROCEDURE — 96372 THER/PROPH/DIAG INJ SC/IM: CPT

## 2022-12-24 PROCEDURE — 83690 ASSAY OF LIPASE: CPT

## 2022-12-24 PROCEDURE — 6360000002 HC RX W HCPCS: Performed by: EMERGENCY MEDICINE

## 2022-12-24 PROCEDURE — 81003 URINALYSIS AUTO W/O SCOPE: CPT

## 2022-12-24 PROCEDURE — 84484 ASSAY OF TROPONIN QUANT: CPT

## 2022-12-24 PROCEDURE — 80307 DRUG TEST PRSMV CHEM ANLYZR: CPT

## 2022-12-24 PROCEDURE — 99285 EMERGENCY DEPT VISIT HI MDM: CPT

## 2022-12-24 PROCEDURE — 80053 COMPREHEN METABOLIC PANEL: CPT

## 2022-12-24 PROCEDURE — 96361 HYDRATE IV INFUSION ADD-ON: CPT

## 2022-12-24 PROCEDURE — 2580000003 HC RX 258: Performed by: EMERGENCY MEDICINE

## 2022-12-24 PROCEDURE — 96374 THER/PROPH/DIAG INJ IV PUSH: CPT

## 2022-12-24 PROCEDURE — 93005 ELECTROCARDIOGRAM TRACING: CPT | Performed by: NURSE PRACTITIONER

## 2022-12-24 PROCEDURE — 71045 X-RAY EXAM CHEST 1 VIEW: CPT

## 2022-12-24 PROCEDURE — 83605 ASSAY OF LACTIC ACID: CPT

## 2022-12-24 PROCEDURE — 85025 COMPLETE CBC W/AUTO DIFF WBC: CPT

## 2022-12-24 RX ORDER — 0.9 % SODIUM CHLORIDE 0.9 %
1000 INTRAVENOUS SOLUTION INTRAVENOUS ONCE
Status: COMPLETED | OUTPATIENT
Start: 2022-12-24 | End: 2022-12-24

## 2022-12-24 RX ORDER — DICYCLOMINE HYDROCHLORIDE 10 MG/ML
20 INJECTION INTRAMUSCULAR ONCE
Status: COMPLETED | OUTPATIENT
Start: 2022-12-24 | End: 2022-12-24

## 2022-12-24 RX ORDER — DICYCLOMINE HYDROCHLORIDE 10 MG/1
10 CAPSULE ORAL 3 TIMES DAILY
Qty: 30 CAPSULE | Refills: 0 | Status: SHIPPED | OUTPATIENT
Start: 2022-12-24 | End: 2022-12-27 | Stop reason: ALTCHOICE

## 2022-12-24 RX ORDER — KETOROLAC TROMETHAMINE 30 MG/ML
30 INJECTION, SOLUTION INTRAMUSCULAR; INTRAVENOUS ONCE
Status: COMPLETED | OUTPATIENT
Start: 2022-12-24 | End: 2022-12-24

## 2022-12-24 RX ADMIN — KETOROLAC TROMETHAMINE 30 MG: 30 INJECTION, SOLUTION INTRAMUSCULAR; INTRAVENOUS at 20:32

## 2022-12-24 RX ADMIN — SODIUM CHLORIDE 1000 ML: 9 INJECTION, SOLUTION INTRAVENOUS at 20:33

## 2022-12-24 RX ADMIN — DICYCLOMINE HYDROCHLORIDE 20 MG: 20 INJECTION INTRAMUSCULAR at 20:32

## 2022-12-24 ASSESSMENT — ENCOUNTER SYMPTOMS
SHORTNESS OF BREATH: 0
ABDOMINAL PAIN: 0
CHEST TIGHTNESS: 0
COLOR CHANGE: 0
COUGH: 0

## 2022-12-25 VITALS
RESPIRATION RATE: 18 BRPM | SYSTOLIC BLOOD PRESSURE: 112 MMHG | TEMPERATURE: 98.4 F | HEART RATE: 73 BPM | OXYGEN SATURATION: 95 % | DIASTOLIC BLOOD PRESSURE: 82 MMHG

## 2022-12-25 LAB
AMPHETAMINES: NEGATIVE
BARBITURATE SCREEN URINE: NEGATIVE
BENZODIAZEPINE SCREEN, URINE: NEGATIVE
CANNABINOID SCREEN URINE: NEGATIVE
COCAINE METABOLITE: NEGATIVE
EKG ATRIAL RATE: 87 BPM
EKG DIAGNOSIS: NORMAL
EKG P AXIS: 36 DEGREES
EKG P-R INTERVAL: 118 MS
EKG Q-T INTERVAL: 360 MS
EKG QRS DURATION: 94 MS
EKG QTC CALCULATION (BAZETT): 433 MS
EKG R AXIS: 23 DEGREES
EKG T AXIS: 12 DEGREES
EKG VENTRICULAR RATE: 87 BPM
OPIATES, URINE: NEGATIVE
OXYCODONE: NEGATIVE
PHENCYCLIDINE, URINE: NEGATIVE
TROPONIN T: <0.01 NG/ML

## 2022-12-25 NOTE — ED PROVIDER NOTES
I independently examined and evaluated Mario Orellana. In brief their history revealed  a 27 y.o. male comes to the emergency department  today complaining of chronic abdominal pain. pT states when he is ill,  the state requires him to be hospitalized. His pain is rated 10/10. He reports it is all over his abdomen and into his chest.  He also states he has recurrent prolapsed rectum that frequently occurs when he has to strain to have a bowel movement. His last bowel movement was yesterday. He denies diarrhea or constipation. He is asking for Bentyl and Toradol by name. Patient has a history of malingering. He has had over 20+ ER evaluations over the last month through [address] [place] [address]. Their focused exam revealed alert and oriented male resting in bed no distress does appear to have some psychiatric disorder he does have some flight of ideas and rapid pressured speech. No SI no HI cranial nerves grossly intact. Lungs are clear heart was mildly tachycardic 2+ pulses throughout abdomen soft nontender bowel sounds present normal.  No signs of hernia or rash or swelling rebound and rigidity are not present. Bowel sounds present normal.  No hernia no pulsatile mass. Normal gait 5-5 strength throughout. ED course: Patient seen with NP please see her note. Patient here with abdominal pain. Apparently my NP knows this patient very well from previous ER visits at different facilities has a history of going to several ERs with chronic abdominal pain. Patient states that he has a chronic rectal prolapse that he states comes and goes he has had surgeon evaluate him before. He states currently is not prolapse but does have some abdominal pain he is requesting Toradol Bentyl he states helps resolve his pain. He denies any fevers nausea vomiting chest pain shortness of breath no other questions or concerns denies any black or bloody stools.   He appears otherwise well vital signs are stable he is not tachycardic he has no abdominal pain on my exam he has no hernia no pulsatile mass no rebound guarding or rigidity bowel sounds present. Normal.  He appears otherwise well check lab work White count is normal we will give him Toradol and Bentyl as requested I did order IV fluids. He did mention some chest pain we did a chest x-ray and EKG. patient rechecked doing well. Patient pending urinalysis. Does have chronic abdominal pain. So far labs unremarkable again pending urinalysis structuring. Patient was also seen by NP, currently at the end of my shift please see NP's note for further dictation and disposition patient likely can go home I do not think he needs labs or imaging outside will be done today. He appears otherwise well. 12 lead EKG per my interpretation:  Normal Sinus Rhythm 87  Axis is   Normal  QTc is   433  There is no specific T wave changes appreciated. There is no specific ST wave changes appreciated. Prior EKG to compare with was not available       All diagnostic, treatment, and disposition decisions were made by myself in conjunction with the Advanced Practice Provider. For all further details of the patient's emergency department visit, please see the Advanced Practice Provider's documentation.         El Zavala,   12/25/22 2057

## 2022-12-25 NOTE — ED PROVIDER NOTES
[address] ENCOUNTER        Pt Name: Viviana Campbell  MRN: [id number]  [place] 1990  Date of evaluation: 12/24/2022  Provider: ROSE Dunn - PRAVEEN  PCP: Sheri Lindsey  Note Started: 8:01 PM EST     I have seen and evaluated this patient with my supervising physician Curtis Romano DO. Triage CHIEF COMPLAINT       Chief Complaint   Patient presents with    Abdominal Pain       HISTORY OF PRESENT ILLNESS   (Location/Symptom, Timing/Onset, Context/Setting, Quality, Duration, Modifying Factors, Severity)  Note limiting factors. Chief Complaint: Abdominal pain    Viviana Campbell is a 27 y.o. male comes to the emergency department  today complaining of chronic abdominal pain. pT states when he is ill,  the state requires him to be hospitalized. His pain is rated 10/10. He reports it is all over his abdomen and into his chest.  He also states he has recurrent prolapsed rectum that frequently occurs when he has to strain to have a bowel movement. His last bowel movement was yesterday. He denies diarrhea or constipation. He is asking for Bentyl and Toradol by name. Patient has a history of malingering. He has had over 20+ ER evaluations over the last month through [address] [place] [address]. Nursing Notes were all reviewed and agreed with or any disagreements were addressed in the HPI. REVIEW OF SYSTEMS    (2-9 systems for level 4, 10 or more for level 5)     Review of Systems   Constitutional:  Negative for chills, fatigue and fever. HENT:  Negative for congestion. Respiratory:  Negative for cough, chest tightness and shortness of breath. Cardiovascular:  Negative for chest pain and leg swelling. Gastrointestinal:  Negative for abdominal pain. Genitourinary:  Negative for difficulty urinating and dysuria. Musculoskeletal:  Negative for myalgias.    Skin: Negative for color change and rash. Neurological:  Negative for dizziness, weakness and headaches. Psychiatric/Behavioral:  Negative for confusion. PAST MEDICAL HISTORY     Past Medical History:   Diagnosis Date    Bipolar 1 disorder ([facility]     Chronic generalized abdominal pain     Depression     GERD (gastroesophageal reflux disease)     IBS (irritable bowel syndrome)     Mental impairment     OCD (obsessive compulsive disorder)     Schizo affective schizophrenia ([facility]        SURGICAL HISTORY     Past Surgical History:   Procedure Laterality Date    COLONOSCOPY      does not know    EGD      does not know       CURRENTMEDICATIONS       Discharge Medication List as of 12/25/2022 12:11 AM        CONTINUE these medications which have NOT CHANGED    Details   aspirin 81 MG EC tablet Take 81 mg by mouth dailyHistorical Med      Fesoterodine Fumarate ER 8 MG TB24 Take 8 mg by mouth every eveningHistorical Med             ALLERGIES     Codeine and Iodine    FAMILYHISTORY       Family History   Problem Relation Age of Onset    Colon Cancer Neg Hx     Esophageal Cancer Neg Hx     Liver Cancer Neg Hx     Rectal Cancer Neg Hx     Stomach Cancer Neg Hx         SOCIAL HISTORY       Social History     Socioeconomic History    Marital status: Unknown   Tobacco Use    Smoking status: Never    Smokeless tobacco: Never   Substance and Sexual Activity    Alcohol use: Never    Drug use: Never       SCREENINGS             PHYSICAL EXAM    (up to 7 for level 4, 8 or more for level 5)     ED Triage Vitals [12/24/22 1739]   BP Temp Temp Source Heart Rate Resp SpO2 Height Weight   (!) 150/99 98.4 °F (36.9 °C) Oral (!) 110 16 99 % -- --       Physical Exam    DIAGNOSTIC RESULTS   LABS:    LABS:  I have reviewed and interpreted all of the currently available lab results from this visit (if applicable) Only abnormal lab results are displayed. All other labs were within normal range or not returned as of this dictation.     Labs Reviewed   CBC WITH AUTO DIFFERENTIAL - Abnormal; Notable for the following components:       Result Value    RBC 4.47 (*)     MCH 31.3 (*)     Segs Relative 70.6 (*)     Lymphocytes % 18.5 (*)     Monocytes % 7.5 (*)     All other components within normal limits   COMPREHENSIVE METABOLIC PANEL - Abnormal; Notable for the following components:    Chloride 98 (*)     BUN 24 (*)     Creatinine 0.8 (*)     All other components within normal limits   LIPASE   LACTIC ACID   URINE DRUG SCREEN   URINALYSIS   TROPONIN       Radiographs (if obtained):  [] The following radiograph was interpreted by myself in the absence of a radiologist:   [x] Radiologist's Report Reviewed:  XR CHEST PORTABLE   Final Result   No acute abnormality visualized. Chart review shows recent radiograph(s):  CT ABDOMEN PELVIS WO CONTRAST Additional Contrast? None    Result Date: 12/19/2022  EXAMINATION: CT OF THE ABDOMEN AND PELVIS WITHOUT CONTRAST 12/19/2022 7:28 pm TECHNIQUE: CT of the abdomen and pelvis was performed without the administration of intravenous contrast. Multiplanar reformatted images are provided for review. Automated exposure control, iterative reconstruction, and/or weight based adjustment of the mA/kV was utilized to reduce the radiation dose to as low as reasonably achievable. COMPARISON: None. HISTORY: ORDERING SYSTEM PROVIDED HISTORY: abdominal pain, recurrent rectal prolapse TECHNOLOGIST PROVIDED HISTORY: Reason for exam:->abdominal pain, recurrent rectal prolapse Additional Contrast?->None Decision Support Exception - unselect if not a suspected or confirmed emergency medical condition->Emergency Medical Condition (MA) Reason for Exam: abdominal pain, recurrent rectal prolapse Additional signs and symptoms: no Relevant Medical/Surgical History: none FINDINGS: Lower Chest: The lung bases are clear. Organs: The liver, spleen, pancreas and adrenal glands appear unremarkable for a non contrasted study.   The kidneys demonstrate no calcifications. No hydronephrosis is seen. No ureteral or bladder calculi are seen. GI/Bowel: Evaluation of the bowel is limited as no enteric contrast was given. No dilated loops of bowel are seen. I do not see a dilated appendix. Pelvis: No pelvic masses or fluid collections are seen. Peritoneum/Retroperitoneum: The abdominal aorta is not aneurysmal.  There are shotty mesenteric and retroperitoneal lymph nodes but no lymphadenopathy is seen. Bones/Soft Tissues: No acute bony abnormalities are noted. 1. No acute intra-abdominal or pelvic abnormality with limitations for a noncontrast CT scan. EKG: When ordered, EKG's are interpreted by the Emergency Department Physician in the absence of a cardiologist.  Please see their note for interpretation of EKG. PROCEDURES   Unless otherwise noted below, none     Procedures    CRITICAL CARE TIME     Na    CONSULTS:  None    EMERGENCY DEPARTMENT COURSE and DIFFERENTIAL DIAGNOSIS/MDM:   Vitals:    Vitals:    12/24/22 2145 12/24/22 2200 12/24/22 2230 12/24/22 2359   BP: 109/78 128/83 109/71 112/82   Pulse: 94 (!) 110 94 73   Resp: 19 20 16 18   Temp:    98.4 °F (36.9 °C)   TempSrc:    Oral   SpO2: 94% 94% 93% 95%       Is this patient to be included in the SEP-1 Core Measure due to severe sepsis or septic shock? No   Exclusion criteria - the patient is NOT to be included for SEP-1 Core Measure due to:  2+ SIRS criteria are not met     This is an alert and oriented x4, 27 y.o. yo male who presents emergency department with diffuse abd pain that radiates into the chest.  Patient has easy nonlabored respirations. Vital signs notable for tachycardia at 110. He is hypertensive at 150/99. Parameters. Patient is afebrile and in no acute distress. PERRL. Skin warm pink and dry. ABDOMEN: Soft, mild tenderness in the entire abdomen area. is not distended. Negative bowel sounds. Negative Leonard's Sign. Negative McBurney's.  Negative peritoneal signs. Negative palpable pulsatile masses. No rigidity noted. No rebound tenderness. Given HPI, review of systems and PE patient presents with acute mild abdominal pain as noted above. Unlikely cholangitis given no right upper quadrant pain, fever, jaundice. Unlikely pancreatitis given pain is not consistent with no radiation to the back or vomiting. Unlikely entered intestinal obstruction his pain is not colicky and patient is having normal movements. No suspicion for acute infectious processes including pneumonia as patient has no cough, no pleuritic pain or shortness of breath. No pyelonephritis suspected as there is no CVA tenderness, hematuria or fever. Unlikely appendicitis given location is consistent and pain is not migratory. Unlikely vascular catastrophe as pain is not sudden onset, it is not tearing and does not radiate to the lower extremities. Unlikely mesenteric ischemia as abdominal pain is not severe or out of proportion to tenderness, no abdominal bruit present, no history of weight loss. Possible/unlikely ACS given no radiation of pain, age less than 48, no shortness of breath, EKG without evidence of ischemia or significant EKG changes and normal troponin. Lab work including:  CBC shows no marked leukocytosis. No anemia or bandemia. Metabolic panel shows no abnormalities to account for patient's symptoms. Lipase is within normal limits. Urinalysis shows no sign of infection. Initial and repeat serial examinations are nonsurgical    CT abdomen pelvis showed no evidence of acute ureterolithiasis, bowel obstruction, bowel ischemia, deep tissue abscess, or other intra-abdominal or pelvic emergency.   In addition, other causes were considered including appendicitis, urinary tract infection, aortic dissection, mesenteric ischemia, as well as other surgical/malignant intra-abdominal processes, there is no evidence for these other possible processes at this time, based on patient's history, presentation, physical, and current state. Patient was given the following medications:  Medications   ketorolac (TORADOL) injection 30 mg (30 mg IntraVENous Given 12/24/22 2032)   dicyclomine (BENTYL) injection 20 mg (20 mg IntraMUSCular Given 12/24/22 2032)   0.9 % sodium chloride bolus (0 mLs IntraVENous Stopped 12/24/22 2133)     CBC with no leukocytosis, no anemia, no anemia. Metabolic panel unremarkable. Lipase is within normal parameters as is his lactic acid. Chest x-ray is normal, per radiologist interpretation. Reassessment of the patient's vitals show improvement of his heart rate after receiving a fluid bolus and medication. Heart rate is now below 194 bpm.  Other vitals within normal parameters. Patient is hemodynamically stable. Urinalysis and drug screen pending. I have discussed with the patient my clinical impression and the result of an evidence-based clinical evaluation to screen for an acute abdominal emergency, as well as the risk of further testing and hospitalization. The evidence shows that the risk for an acute condition related to todays symptoms and signs is extremely low. Although the risk of progression or new symptoms cannot be excluded, the risks of further testing or hospitalization likely exceed the benefit, and the patient declines further emergent evaluation or hospitalization for evaluation of the abdominal pain. Patient denies any further questions or concern and is discharge in stable condition with review of strict ED return guidelines. FINAL IMPRESSION      1. Chronic abdominal pain    2.  Chronic chest pain          DISPOSITION/PLAN   DISPOSITION Decision To Discharge 12/25/2022 12:04:48 AM      PATIENT REFERREDTO:  Otto Kelly  [address]  [place]  [facility] Amezcua  [phone]    Call in 1 week  follow up    DISCHARGE MEDICATIONS:  Discharge Medication List as of 12/25/2022 12:11 AM        START taking these medications    Details   dicyclomine (BENTYL) 10 MG capsule Take 1 capsule by mouth in the morning, at noon, and at bedtime for 10 days, Disp-30 capsule, R-0Normal             DISCONTINUED MEDICATIONS:  Discharge Medication List as of 12/25/2022 12:11 AM          Comment: Please note this report has been produced using speech recognition software and may contain errors related to that system including errors in grammar, punctuation, and spelling, as well as words and phrases that may be inappropriate. If there are any questions or concerns please feel free to contact the dictating provider for clarification.     ROSE Rich CNP (electronically signed)       ROSE Rich CNP  12/26/22 2411

## 2022-12-25 NOTE — ED NOTES
Patient getting upset that he is getting discharged and he doesn't understand why they don't keep him in the hospital. I explained to him that the provider did not have a reason for admission during his ED visit. Patient stating the \"hotel across the street has been calling the hospital and asking questions about me\". I explained that there has not been any calls about him but even if they did call they would not be able to obtain information if the person isn't listed in his chart due to HIPAA requirements. Security came to room to assist with discharge and removal from ED as patient was refusing to leave. Patient walked out of ED on own. No distress noted. Patient alert and oriented.       Olga Moscoso RN  12/25/22 0970

## 2022-12-26 ENCOUNTER — HOSPITAL ENCOUNTER (EMERGENCY)
Age: 32
Discharge: LEFT BEFORE DISCHARGE | End: 2022-12-26
Payer: COMMERCIAL

## 2022-12-26 VITALS
RESPIRATION RATE: 16 BRPM | OXYGEN SATURATION: 95 % | TEMPERATURE: 98.1 F | SYSTOLIC BLOOD PRESSURE: 136 MMHG | DIASTOLIC BLOOD PRESSURE: 84 MMHG | HEART RATE: 97 BPM

## 2022-12-26 LAB
ALBUMIN SERPL-MCNC: 4.4 GM/DL (ref 3.4–5)
ALP BLD-CCNC: 68 IU/L (ref 40–129)
ALT SERPL-CCNC: 14 U/L (ref 10–40)
ANION GAP SERPL CALCULATED.3IONS-SCNC: 9 MMOL/L (ref 4–16)
AST SERPL-CCNC: 20 IU/L (ref 15–37)
BASOPHILS ABSOLUTE: 0 K/CU MM
BASOPHILS RELATIVE PERCENT: 0.2 % (ref 0–1)
BILIRUB SERPL-MCNC: 0.2 MG/DL (ref 0–1)
BUN BLDV-MCNC: 18 MG/DL (ref 6–23)
CALCIUM SERPL-MCNC: 9.3 MG/DL (ref 8.3–10.6)
CHLORIDE BLD-SCNC: 101 MMOL/L (ref 99–110)
CO2: 29 MMOL/L (ref 21–32)
CREAT SERPL-MCNC: 0.8 MG/DL (ref 0.9–1.3)
DIFFERENTIAL TYPE: ABNORMAL
EKG ATRIAL RATE: 87 BPM
EKG DIAGNOSIS: NORMAL
EKG P AXIS: 36 DEGREES
EKG P-R INTERVAL: 118 MS
EKG Q-T INTERVAL: 360 MS
EKG QRS DURATION: 94 MS
EKG QTC CALCULATION (BAZETT): 433 MS
EKG R AXIS: 23 DEGREES
EKG T AXIS: 12 DEGREES
EKG VENTRICULAR RATE: 87 BPM
EOSINOPHILS ABSOLUTE: 0.2 K/CU MM
EOSINOPHILS RELATIVE PERCENT: 2.1 % (ref 0–3)
GFR SERPL CREATININE-BSD FRML MDRD: >60 ML/MIN/1.73M2
GLUCOSE BLD-MCNC: 98 MG/DL (ref 70–99)
HCT VFR BLD CALC: 41.3 % (ref 42–52)
HEMOGLOBIN: 13.5 GM/DL (ref 13.5–18)
IMMATURE NEUTROPHIL %: 0.4 % (ref 0–0.43)
LIPASE: 37 IU/L (ref 13–60)
LYMPHOCYTES ABSOLUTE: 3.2 K/CU MM
LYMPHOCYTES RELATIVE PERCENT: 32 % (ref 24–44)
MCH RBC QN AUTO: 31 PG (ref 27–31)
MCHC RBC AUTO-ENTMCNC: 32.7 % (ref 32–36)
MCV RBC AUTO: 94.9 FL (ref 78–100)
MONOCYTES ABSOLUTE: 0.8 K/CU MM
MONOCYTES RELATIVE PERCENT: 7.5 % (ref 0–4)
NUCLEATED RBC %: 0 %
PDW BLD-RTO: 12.1 % (ref 11.7–14.9)
PLATELET # BLD: 219 K/CU MM (ref 140–440)
PMV BLD AUTO: 9.6 FL (ref 7.5–11.1)
POTASSIUM SERPL-SCNC: 4 MMOL/L (ref 3.5–5.1)
RBC # BLD: 4.35 M/CU MM (ref 4.6–6.2)
SEGMENTED NEUTROPHILS ABSOLUTE COUNT: 5.9 K/CU MM
SEGMENTED NEUTROPHILS RELATIVE PERCENT: 57.8 % (ref 36–66)
SODIUM BLD-SCNC: 139 MMOL/L (ref 135–145)
TOTAL IMMATURE NEUTOROPHIL: 0.04 K/CU MM
TOTAL NUCLEATED RBC: 0 K/CU MM
TOTAL PROTEIN: 7.5 GM/DL (ref 6.4–8.2)
WBC # BLD: 10.1 K/CU MM (ref 4–10.5)

## 2022-12-26 PROCEDURE — 93005 ELECTROCARDIOGRAM TRACING: CPT | Performed by: EMERGENCY MEDICINE

## 2022-12-26 PROCEDURE — 4500000002 HC ER NO CHARGE

## 2022-12-26 PROCEDURE — 83690 ASSAY OF LIPASE: CPT

## 2022-12-26 PROCEDURE — 85025 COMPLETE CBC W/AUTO DIFF WBC: CPT

## 2022-12-26 PROCEDURE — 93010 ELECTROCARDIOGRAM REPORT: CPT | Performed by: INTERNAL MEDICINE

## 2022-12-26 PROCEDURE — 80053 COMPREHEN METABOLIC PANEL: CPT

## 2022-12-26 ASSESSMENT — PAIN SCALES - GENERAL: PAINLEVEL_OUTOF10: 10

## 2022-12-26 ASSESSMENT — PAIN - FUNCTIONAL ASSESSMENT: PAIN_FUNCTIONAL_ASSESSMENT: 0-10

## 2022-12-27 ENCOUNTER — HOSPITAL ENCOUNTER (EMERGENCY)
Age: 32
Discharge: HOME | End: 2022-12-27
Payer: COMMERCIAL

## 2022-12-27 VITALS
TEMPERATURE: 98 F | DIASTOLIC BLOOD PRESSURE: 81 MMHG | HEART RATE: 82 BPM | SYSTOLIC BLOOD PRESSURE: 126 MMHG | WEIGHT: 131 LBS | RESPIRATION RATE: 18 BRPM | BODY MASS INDEX: 23.21 KG/M2 | OXYGEN SATURATION: 98 %

## 2022-12-27 DIAGNOSIS — R10.9 CHRONIC ABDOMINAL PAIN: Primary | ICD-10-CM

## 2022-12-27 DIAGNOSIS — G89.29 CHRONIC ABDOMINAL PAIN: Primary | ICD-10-CM

## 2022-12-27 DIAGNOSIS — Z76.5 MALINGERING: ICD-10-CM

## 2022-12-27 LAB
ALBUMIN SERPL-MCNC: 4.6 GM/DL (ref 3.4–5)
ALP BLD-CCNC: 76 IU/L (ref 40–129)
ALT SERPL-CCNC: 16 U/L (ref 10–40)
ANION GAP SERPL CALCULATED.3IONS-SCNC: 8 MMOL/L (ref 4–16)
AST SERPL-CCNC: 22 IU/L (ref 15–37)
ATYPICAL LYMPHOCYTE ABSOLUTE COUNT: ABNORMAL
BANDED NEUTROPHILS ABSOLUTE COUNT: 0.42 K/CU MM
BANDED NEUTROPHILS RELATIVE PERCENT: 5 % (ref 5–11)
BILIRUB SERPL-MCNC: 0.4 MG/DL (ref 0–1)
BILIRUBIN URINE: NEGATIVE MG/DL
BLOOD, URINE: NEGATIVE
BUN BLDV-MCNC: 13 MG/DL (ref 6–23)
CALCIUM SERPL-MCNC: 9.3 MG/DL (ref 8.3–10.6)
CHLORIDE BLD-SCNC: 99 MMOL/L (ref 99–110)
CLARITY: CLEAR
CO2: 29 MMOL/L (ref 21–32)
COLOR: YELLOW
COMMENT UA: NORMAL
CREAT SERPL-MCNC: 0.8 MG/DL (ref 0.9–1.3)
DIFFERENTIAL TYPE: ABNORMAL
EOSINOPHILS ABSOLUTE: 0.2 K/CU MM
EOSINOPHILS RELATIVE PERCENT: 2 % (ref 0–3)
GFR SERPL CREATININE-BSD FRML MDRD: >60 ML/MIN/1.73M2
GLUCOSE BLD-MCNC: 107 MG/DL (ref 70–99)
GLUCOSE, URINE: NEGATIVE MG/DL
HCT VFR BLD CALC: 44.5 % (ref 42–52)
HEMOGLOBIN: 14.3 GM/DL (ref 13.5–18)
KETONES, URINE: NEGATIVE MG/DL
LEUKOCYTE ESTERASE, URINE: NEGATIVE
LIPASE: 33 IU/L (ref 13–60)
LYMPHOCYTES ABSOLUTE: 2.3 K/CU MM
LYMPHOCYTES RELATIVE PERCENT: 27 % (ref 24–44)
MCH RBC QN AUTO: 31.2 PG (ref 27–31)
MCHC RBC AUTO-ENTMCNC: 32.1 % (ref 32–36)
MCV RBC AUTO: 97.2 FL (ref 78–100)
MONOCYTES ABSOLUTE: 0.5 K/CU MM
MONOCYTES RELATIVE PERCENT: 6 % (ref 0–4)
NITRITE URINE, QUANTITATIVE: NEGATIVE
PDW BLD-RTO: 12.4 % (ref 11.7–14.9)
PH, URINE: 5.5 (ref 5–8)
PLATELET # BLD: ABNORMAL K/CU MM (ref 140–440)
PMV BLD AUTO: 11.2 FL (ref 7.5–11.1)
POTASSIUM SERPL-SCNC: 4.1 MMOL/L (ref 3.5–5.1)
PROTEIN UA: NEGATIVE MG/DL
RBC # BLD: 4.58 M/CU MM (ref 4.6–6.2)
SEGMENTED NEUTROPHILS ABSOLUTE COUNT: 5 K/CU MM
SEGMENTED NEUTROPHILS RELATIVE PERCENT: 60 % (ref 36–66)
SODIUM BLD-SCNC: 136 MMOL/L (ref 135–145)
SPECIFIC GRAVITY UA: 1.01 (ref 1–1.03)
TOTAL PROTEIN: 7.8 GM/DL (ref 6.4–8.2)
UROBILINOGEN, URINE: 0.2 MG/DL (ref 0.2–1)
WBC # BLD: 8.4 K/CU MM (ref 4–10.5)

## 2022-12-27 PROCEDURE — 85007 BL SMEAR W/DIFF WBC COUNT: CPT

## 2022-12-27 PROCEDURE — 85027 COMPLETE CBC AUTOMATED: CPT

## 2022-12-27 PROCEDURE — 80053 COMPREHEN METABOLIC PANEL: CPT

## 2022-12-27 PROCEDURE — 99283 EMERGENCY DEPT VISIT LOW MDM: CPT

## 2022-12-27 PROCEDURE — 83690 ASSAY OF LIPASE: CPT

## 2022-12-27 PROCEDURE — 81003 URINALYSIS AUTO W/O SCOPE: CPT

## 2022-12-27 RX ORDER — ONDANSETRON 4 MG/1
4 TABLET, FILM COATED ORAL EVERY 8 HOURS PRN
Qty: 10 TABLET | Refills: 0 | Status: SHIPPED | OUTPATIENT
Start: 2022-12-27 | End: 2022-12-30 | Stop reason: SDUPTHER

## 2022-12-27 RX ORDER — DICYCLOMINE HCL 20 MG
20 TABLET ORAL ONCE
Status: DISCONTINUED | OUTPATIENT
Start: 2022-12-27 | End: 2022-12-27 | Stop reason: HOSPADM

## 2022-12-27 RX ORDER — DICYCLOMINE HYDROCHLORIDE 10 MG/1
10 CAPSULE ORAL 4 TIMES DAILY
Qty: 40 CAPSULE | Refills: 0 | Status: SHIPPED | OUTPATIENT
Start: 2022-12-27 | End: 2023-01-06

## 2022-12-27 RX ORDER — ONDANSETRON 4 MG/1
4 TABLET, ORALLY DISINTEGRATING ORAL ONCE
Status: DISCONTINUED | OUTPATIENT
Start: 2022-12-27 | End: 2022-12-27 | Stop reason: HOSPADM

## 2022-12-27 NOTE — ED NOTES
Pt states that he is not waiting for a room; states that he had to wait a couple days ago and is not going to do that this time as well.       Leonie Umana RN  12/26/22 4839

## 2022-12-27 NOTE — ED PROVIDER NOTES
[address] ENCOUNTER        Pt Name: Sumaya Veras  MRN: [id number]  [place] 1990  Date of evaluation: 12/27/2022  Provider: WENDY Trujillo  PCP: Marcie EDGAR. I have evaluated this patient. My supervising physician was available for consultation. Triage CHIEF COMPLAINT       Chief Complaint   Patient presents with    Abdominal Pain     X2 weeks         HISTORY OF PRESENT ILLNESS      Chief Complaint: Chronic abdominal pain, rectal prolapse    Sumaya Veras is a 27 y.o. male who presents back to the emergency department today for ninth encounter over the last 3 weeks. Patient is having ongoing abdominal pain, has history of rectal prolapse. Has not filled any medication has not followed up. States that his pain is not getting any better. He has no medications. Patient is staying at an apartment across the street from the hospital for lodging. Has had history of malingering has been seen through [facility] for similar. Upon asking patient if there are any additional resources to give he said he been \"trying to do his part\". He states that he has been labuse/taking advantage of at his residence. He states that he has try to come back to police. Nursing Notes were all reviewed and agreed with or any disagreements were addressed in the HPI. REVIEW OF SYSTEMS     Constitutional:   Denies fever, chills, weight loss or weakness   HENT:   Denies sore throat or ear pain   Cardiovascular:  Denies chest pain, palpitations   Respiratory:  Denies cough or shortness of breath    GI:   Denies abdominal pain, nausea, vomiting, or diarrhea  :  Denies any urinary symptoms or vaginal symptoms.    Musculoskeletal:   Denies back pain  Skin:   Denies rash  Neurologic:   Denies headache, focal weakness or sensory changes   Endocrine:  Denies polyuria or polydypsia   Lymphatic:  Denies swollen glands     PAST MEDICAL HISTORY     Past Medical History:   Diagnosis Date    Bipolar 1 disorder (HCC)     Chronic generalized abdominal pain     Depression     GERD (gastroesophageal reflux disease)     IBS (irritable bowel syndrome)     Mental impairment     OCD (obsessive compulsive disorder)     [facility]        SURGICAL HISTORY     Past Surgical History:   Procedure Laterality Date    COLONOSCOPY      does not know    EGD      does not know       CURRENTMEDICATIONS       Discharge Medication List as of 12/27/2022 11:00 AM        CONTINUE these medications which have NOT CHANGED    Details   aspirin 81 MG EC tablet Take 81 mg by mouth dailyHistorical Med      Fesoterodine Fumarate ER 8 MG TB24 Take 8 mg by mouth every eveningHistorical Med             ALLERGIES     Codeine and Iodine    FAMILYHISTORY       Family History   Problem Relation Age of Onset    Colon Cancer Neg Hx     Esophageal Cancer Neg Hx     Liver Cancer Neg Hx     Rectal Cancer Neg Hx     Stomach Cancer Neg Hx         SOCIAL HISTORY       Social History     Socioeconomic History    Marital status: Unknown   Tobacco Use    Smoking status: Never    Smokeless tobacco: Never   Substance and Sexual Activity    Alcohol use: Never    Drug use: Never       SCREENINGS           PHYSICAL EXAM       ED Triage Vitals   BP Temp Temp src Heart Rate Resp SpO2 Height Weight   12/27/22 0930 12/27/22 0930 -- 12/27/22 0930 12/27/22 0930 12/27/22 0930 -- 12/27/22 0931   126/81 98 °F (36.7 °C)  82 18 98 %  131 lb (59.4 kg)      Constitutional:  Well developed, Well nourished. No distress  HENT:  Normocephalic, Atraumatic, PERRL. EOMI. Sclera clear. Conjunctiva normal, No discharge. Neck/Lymphatics: supple, no JVD, no swollen nodes  Cardiovascular:   RRR,  no murmurs/rubs/gallops. Respiratory:   Nonlabored breathing. Normal breath sounds, No wheezing  Abdomen:   Bowel sounds normal, Soft, No tenderness, no masses. Musculoskeletal:    There is no edema, asymmetry, or calf / thigh tenderness bilaterally. No cyanosis. No cool or pale-appearing limb. Distal cap refill and pulses intact bilateral upper and lower extremities  Bilateral upper and lower extremity ROM intact without pain or obvious deficit  Integument:   Warm, Dry  Neurologic:  Alert & oriented , No focal deficits noted. Cranial nerves II through XII grossly intact. Normal gross motor coordination & motor strength bilateral upper and lower extremities  Sensation intact. Psychiatric:  Affect normal, Mood normal.     DIAGNOSTIC RESULTS   LABS:    Labs Reviewed   CBC WITH AUTO DIFFERENTIAL - Abnormal; Notable for the following components:       Result Value    RBC 4.58 (*)     MCH 31.2 (*)     MPV 11.2 (*)     Monocytes % 6.0 (*)     All other components within normal limits   COMPREHENSIVE METABOLIC PANEL - Abnormal; Notable for the following components:    Creatinine 0.8 (*)     Glucose 107 (*)     All other components within normal limits   LIPASE   URINALYSIS       When ordered, only abnormal lab results are displayed. All other labs were within normal range or not returned as of this dictation. EKG: When ordered, EKG's are interpreted by the Emergency Department Physician in the absence of a cardiologist.  Please see their note for interpretation of EKG. RADIOLOGY:   Non-plain film images such as CT, Ultrasound and MRI are read by the radiologist. Plain radiographic images are visualized and preliminarily interpreted by the  ED Provider with the below findings:    Interpretation perthe Radiologist below, if available at the time of this note:    No orders to display     No results found.       PROCEDURES   Unless otherwise noted below, none     CRITICAL CARE   CRITICAL CARE NOTE:   N/A    CONSULTS:  None      EMERGENCY DEPARTMENT COURSE and MDM:   Vitals:    Vitals:    12/27/22 0930 12/27/22 0931   BP: 126/81    Pulse: 82    Resp: 18 Temp: 98 °F (36.7 °C)    SpO2: 98%    Weight:  131 lb (59.4 kg)       Patient was given thefollowing medications:  Medications - No data to display      Is this patient to be included in the SEP-1 Core Measure due to severe sepsis or septic shock? No   Exclusion criteria - the patient is NOT to be included for SEP-1 Core Measure due to: Infection is not suspected    MDM:  Patient presents as above. Emergent etiologies considered. Patient seen and examined. Work-up initiated secondary to presentation, physical exam findings, vital signs and medical chart review. In brief, 70-year-old male presented emerged department today with chronic abdominal pain, rectal prolapse. Patient has been seen 9 times over the last 3 weeks for similar. Has had extensive work-up that have been negative. Had tried to get follow-up and prescribed meds which she has not feeling oriented or following up. He seen case management numerous times. Today he states he is having more of the same, abdominal pain, going to the bathroom frequently. After addressing some of the issues he states that he feels that he is not safe at the place that he is staying he states has been trying to get a hold of the place but been unable to. At this point though we took basic labs which looked very well, he was given oral Zofran and Bentyl. We will try to get case management involved for outpatient follow-up. Did try to contact the police but he patient ultimately was up eloping prior to their evaluation. CLINICAL IMPRESSION      1. Chronic abdominal pain    2.  Malingering          DISPOSITION/PLAN   DISPOSITION Decision To Discharge 12/27/2022 12:27:42 PM    PATIENT REFERREDTO:  Chalmers Barthel  [address] [place] [address] [phone]            DISCHARGE MEDICATIONS:  Discharge Medication List as of 12/27/2022 11:00 AM        START taking these medications    Details   ondansetron (ZOFRAN) 4 MG tablet Take 1 tablet by mouth every 8 hours as needed for Nausea, Disp-10 tablet, R-0Normal      dicyclomine (BENTYL) 10 MG capsule Take 1 capsule by mouth 4 times daily for 10 days, Disp-40 capsule, R-0Normal             DISCONTINUED MEDICATIONS:  Discharge Medication List as of 12/27/2022 11:00 AM                 (Please note that portions ofthis note were completed with a voice recognition program.  Efforts were made to edit the dictations but occasionally words are mis-transcribed. )    WENDY Wagoner (electronically signed)            WENDY Salguero  12/27/22 5816

## 2022-12-27 NOTE — ED PROVIDER NOTES
This is an EKG note, I did not see or evaluate this patient. Patient left the emergency department before being seen. EKG was independently interpreted, without cardiology present. Normal sinus rhythm, rate 84, , QRS 92, QTc 425, no acute ST elevation. Compared to previous, sinus rhythm has replaced sinus tachycardia, otherwise unchanged.        [id number] [facility]  12/26/22 0442

## 2022-12-27 NOTE — ED NOTES
Attempted to discharge patient, patient unable to be located at this time.         Domenico Duncan RN  12/27/22 2962

## 2022-12-28 ENCOUNTER — HOSPITAL ENCOUNTER (EMERGENCY)
Age: 32
Discharge: LEFT BEFORE DISCHARGE | End: 2022-12-28

## 2022-12-28 VITALS
WEIGHT: 131 LBS | DIASTOLIC BLOOD PRESSURE: 74 MMHG | TEMPERATURE: 98 F | OXYGEN SATURATION: 95 % | HEIGHT: 63 IN | RESPIRATION RATE: 17 BRPM | HEART RATE: 97 BPM | SYSTOLIC BLOOD PRESSURE: 118 MMHG | BODY MASS INDEX: 23.21 KG/M2

## 2022-12-28 LAB
EKG ATRIAL RATE: 84 BPM
EKG DIAGNOSIS: NORMAL
EKG P AXIS: 46 DEGREES
EKG P-R INTERVAL: 114 MS
EKG Q-T INTERVAL: 360 MS
EKG QRS DURATION: 92 MS
EKG QTC CALCULATION (BAZETT): 425 MS
EKG R AXIS: 41 DEGREES
EKG T AXIS: 26 DEGREES
EKG VENTRICULAR RATE: 84 BPM

## 2022-12-28 PROCEDURE — 93010 ELECTROCARDIOGRAM REPORT: CPT | Performed by: INTERNAL MEDICINE

## 2022-12-29 ENCOUNTER — APPOINTMENT (OUTPATIENT)
Dept: CT IMAGING | Age: 32
End: 2022-12-29
Payer: COMMERCIAL

## 2022-12-29 ENCOUNTER — HOSPITAL ENCOUNTER (EMERGENCY)
Age: 32
Discharge: HOME | End: 2022-12-29
Attending: STUDENT IN AN ORGANIZED HEALTH CARE EDUCATION/TRAINING PROGRAM
Payer: COMMERCIAL

## 2022-12-29 VITALS
RESPIRATION RATE: 18 BRPM | SYSTOLIC BLOOD PRESSURE: 116 MMHG | DIASTOLIC BLOOD PRESSURE: 84 MMHG | OXYGEN SATURATION: 97 % | TEMPERATURE: 98.9 F | HEART RATE: 99 BPM

## 2022-12-29 DIAGNOSIS — R10.9 ABDOMINAL PAIN, UNSPECIFIED ABDOMINAL LOCATION: ICD-10-CM

## 2022-12-29 DIAGNOSIS — K52.9 ENTEROCOLITIS: Primary | ICD-10-CM

## 2022-12-29 LAB
ALBUMIN SERPL-MCNC: 4.7 GM/DL (ref 3.4–5)
ALP BLD-CCNC: 73 IU/L (ref 40–129)
ALT SERPL-CCNC: 13 U/L (ref 10–40)
AMPHETAMINES: NEGATIVE
ANION GAP SERPL CALCULATED.3IONS-SCNC: 12 MMOL/L (ref 4–16)
AST SERPL-CCNC: 20 IU/L (ref 15–37)
BARBITURATE SCREEN URINE: NEGATIVE
BASOPHILS ABSOLUTE: 0 K/CU MM
BASOPHILS RELATIVE PERCENT: 0.3 % (ref 0–1)
BENZODIAZEPINE SCREEN, URINE: NEGATIVE
BILIRUB SERPL-MCNC: 0.5 MG/DL (ref 0–1)
BILIRUBIN DIRECT: 0.2 MG/DL (ref 0–0.3)
BILIRUBIN URINE: NEGATIVE MG/DL
BILIRUBIN, INDIRECT: 0.3 MG/DL (ref 0–0.7)
BLOOD, URINE: NEGATIVE
BUN BLDV-MCNC: 11 MG/DL (ref 6–23)
CALCIUM SERPL-MCNC: 9.5 MG/DL (ref 8.3–10.6)
CANNABINOID SCREEN URINE: ABNORMAL
CHLORIDE BLD-SCNC: 99 MMOL/L (ref 99–110)
CLARITY: CLEAR
CO2: 28 MMOL/L (ref 21–32)
COCAINE METABOLITE: NEGATIVE
COLOR: YELLOW
COMMENT UA: NORMAL
CREAT SERPL-MCNC: 0.9 MG/DL (ref 0.9–1.3)
DIFFERENTIAL TYPE: ABNORMAL
EOSINOPHILS ABSOLUTE: 0 K/CU MM
EOSINOPHILS RELATIVE PERCENT: 0.6 % (ref 0–3)
GFR SERPL CREATININE-BSD FRML MDRD: >60 ML/MIN/1.73M2
GLUCOSE BLD-MCNC: 85 MG/DL (ref 70–99)
GLUCOSE, URINE: NEGATIVE MG/DL
HCT VFR BLD CALC: 42.8 % (ref 42–52)
HEMOGLOBIN: 14.1 GM/DL (ref 13.5–18)
IMMATURE NEUTROPHIL %: 0.3 % (ref 0–0.43)
KETONES, URINE: NEGATIVE MG/DL
LEUKOCYTE ESTERASE, URINE: NEGATIVE
LIPASE: 28 IU/L (ref 13–60)
LYMPHOCYTES ABSOLUTE: 1.6 K/CU MM
LYMPHOCYTES RELATIVE PERCENT: 22.7 % (ref 24–44)
MAGNESIUM: 2 MG/DL (ref 1.8–2.4)
MCH RBC QN AUTO: 31.2 PG (ref 27–31)
MCHC RBC AUTO-ENTMCNC: 32.9 % (ref 32–36)
MCV RBC AUTO: 94.7 FL (ref 78–100)
MONOCYTES ABSOLUTE: 0.6 K/CU MM
MONOCYTES RELATIVE PERCENT: 8.7 % (ref 0–4)
NITRITE URINE, QUANTITATIVE: NEGATIVE
NUCLEATED RBC %: 0 %
OPIATES, URINE: NEGATIVE
OXYCODONE: NEGATIVE
PDW BLD-RTO: 12.2 % (ref 11.7–14.9)
PH, URINE: 6 (ref 5–8)
PHENCYCLIDINE, URINE: NEGATIVE
PLATELET # BLD: 228 K/CU MM (ref 140–440)
PMV BLD AUTO: 9.8 FL (ref 7.5–11.1)
POTASSIUM SERPL-SCNC: 3.9 MMOL/L (ref 3.5–5.1)
PROTEIN UA: NEGATIVE MG/DL
RBC # BLD: 4.52 M/CU MM (ref 4.6–6.2)
SEGMENTED NEUTROPHILS ABSOLUTE COUNT: 4.8 K/CU MM
SEGMENTED NEUTROPHILS RELATIVE PERCENT: 67.4 % (ref 36–66)
SODIUM BLD-SCNC: 139 MMOL/L (ref 135–145)
SPECIFIC GRAVITY UA: <1.005 (ref 1–1.03)
TOTAL IMMATURE NEUTOROPHIL: 0.02 K/CU MM
TOTAL NUCLEATED RBC: 0 K/CU MM
TOTAL PROTEIN: 7.6 GM/DL (ref 6.4–8.2)
UROBILINOGEN, URINE: 0.2 MG/DL (ref 0.2–1)
WBC # BLD: 7.1 K/CU MM (ref 4–10.5)

## 2022-12-29 PROCEDURE — 83690 ASSAY OF LIPASE: CPT

## 2022-12-29 PROCEDURE — 81003 URINALYSIS AUTO W/O SCOPE: CPT

## 2022-12-29 PROCEDURE — 74176 CT ABD & PELVIS W/O CONTRAST: CPT

## 2022-12-29 PROCEDURE — 93005 ELECTROCARDIOGRAM TRACING: CPT | Performed by: STUDENT IN AN ORGANIZED HEALTH CARE EDUCATION/TRAINING PROGRAM

## 2022-12-29 PROCEDURE — 85025 COMPLETE CBC W/AUTO DIFF WBC: CPT

## 2022-12-29 PROCEDURE — 80307 DRUG TEST PRSMV CHEM ANLYZR: CPT

## 2022-12-29 PROCEDURE — 83735 ASSAY OF MAGNESIUM: CPT

## 2022-12-29 PROCEDURE — 6360000002 HC RX W HCPCS: Performed by: STUDENT IN AN ORGANIZED HEALTH CARE EDUCATION/TRAINING PROGRAM

## 2022-12-29 PROCEDURE — 82248 BILIRUBIN DIRECT: CPT

## 2022-12-29 PROCEDURE — 96372 THER/PROPH/DIAG INJ SC/IM: CPT

## 2022-12-29 PROCEDURE — 80053 COMPREHEN METABOLIC PANEL: CPT

## 2022-12-29 PROCEDURE — 99284 EMERGENCY DEPT VISIT MOD MDM: CPT

## 2022-12-29 RX ORDER — KETOROLAC TROMETHAMINE 30 MG/ML
60 INJECTION, SOLUTION INTRAMUSCULAR; INTRAVENOUS ONCE
Status: COMPLETED | OUTPATIENT
Start: 2022-12-29 | End: 2022-12-29

## 2022-12-29 RX ORDER — METRONIDAZOLE 500 MG/1
500 TABLET ORAL 3 TIMES DAILY
Qty: 15 TABLET | Refills: 0 | Status: SHIPPED | OUTPATIENT
Start: 2022-12-29 | End: 2023-01-03

## 2022-12-29 RX ORDER — FAMOTIDINE 20 MG/1
20 TABLET, FILM COATED ORAL 2 TIMES DAILY
Qty: 28 TABLET | Refills: 0 | Status: SHIPPED | OUTPATIENT
Start: 2022-12-29 | End: 2023-01-12

## 2022-12-29 RX ORDER — DICYCLOMINE HYDROCHLORIDE 10 MG/ML
20 INJECTION INTRAMUSCULAR ONCE
Status: COMPLETED | OUTPATIENT
Start: 2022-12-29 | End: 2022-12-29

## 2022-12-29 RX ADMIN — DICYCLOMINE HYDROCHLORIDE 20 MG: 20 INJECTION, SOLUTION INTRAMUSCULAR at 20:47

## 2022-12-29 RX ADMIN — KETOROLAC TROMETHAMINE 60 MG: 30 INJECTION, SOLUTION INTRAMUSCULAR; INTRAVENOUS at 20:48

## 2022-12-29 ASSESSMENT — PAIN DESCRIPTION - LOCATION: LOCATION: ABDOMEN

## 2022-12-29 ASSESSMENT — PAIN SCALES - GENERAL: PAINLEVEL_OUTOF10: 10

## 2022-12-29 ASSESSMENT — PAIN - FUNCTIONAL ASSESSMENT: PAIN_FUNCTIONAL_ASSESSMENT: 0-10

## 2022-12-29 ASSESSMENT — PAIN DESCRIPTION - DESCRIPTORS: DESCRIPTORS: ACHING

## 2022-12-29 NOTE — ED PROVIDER NOTES
Emergency Department Encounter    Patient: Charisse Rubio  MRN: [id number]  : 1990  Date of Evaluation: 2022  ED Provider:  Pedrito Romero DO    Triage Chief Complaint:   Abdominal Pain    [place]:  Charisse Rubio is a 27 y.o. male who presented to the ED with a history of abdominal pain. Patient has been to the ED multiple times for similar symptoms. This is the 10th visit in about 2 weeks for the same symptoms. Patient has been given Bentyl and Toradol, has had a CT scan of the abdomen and has been discharged with prescription medications for which patient has not been taking as prescribed. Patient has also been to different ERs including [facility] for similar symptoms. Today patient insists that he has abdominal pain but is unwilling to have a gastroenterology referral or surgery outpatient referral.  Patient insists that nobody is taking him serious. No history of fever, back pain, dysuria, nausea or vomiting. Patient admits to occasional diarrhea; nonbloody diarrhea. No history of constipation.   ROS - see HPI, below listed is current ROS at time of my eval:  ROS is an in history; otherwise unremarkable    Past Medical History:   Diagnosis Date    Bipolar 1 disorder ([facility]     Chronic generalized abdominal pain     Depression     GERD (gastroesophageal reflux disease)     IBS (irritable bowel syndrome)     Mental impairment     OCD (obsessive compulsive disorder)     Schizo affective schizophrenia ([facility]      Past Surgical History:   Procedure Laterality Date    COLONOSCOPY      does not know    EGD      does not know     Family History   Problem Relation Age of Onset    Colon Cancer Neg Hx     Esophageal Cancer Neg Hx     Liver Cancer Neg Hx     Rectal Cancer Neg Hx     Stomach Cancer Neg Hx      Social History     Socioeconomic History    Marital status: Unknown     Spouse name: Not on file    Number of children: Not on file    Years of education: Not on file    Highest education level: Not on file   Occupational History    Not on file   Tobacco Use    Smoking status: Never    Smokeless tobacco: Never   Substance and Sexual Activity    Alcohol use: Never    Drug use: Never    Sexual activity: Not on file   Other Topics Concern    Not on file   Social History Narrative    Not on file     Social Determinants of Health     Financial Resource Strain: Not on file   Food Insecurity: Not on file   Transportation Needs: Not on file   Physical Activity: Not on file   Stress: Not on file   Social Connections: Not on file   Intimate Partner Violence: Not on file   Housing Stability: Not on file     No current facility-administered medications for this encounter. Current Outpatient Medications   Medication Sig Dispense Refill    famotidine (PEPCID) 20 MG tablet Take 1 tablet by mouth 2 times daily for 14 days 28 tablet 0    metroNIDAZOLE (FLAGYL) 500 MG tablet Take 1 tablet by mouth 3 times daily for 5 days 15 tablet 0    ondansetron (ZOFRAN) 4 MG tablet Take 1 tablet by mouth every 8 hours as needed for Nausea 10 tablet 0    dicyclomine (BENTYL) 10 MG capsule Take 1 capsule by mouth 4 times daily for 10 days 40 capsule 0    aspirin 81 MG EC tablet Take 81 mg by mouth daily      Fesoterodine Fumarate ER 8 MG TB24 Take 8 mg by mouth every evening       Allergies   Allergen Reactions    Latex     Adhesive Tape     Codeine     Iodine     Shellfish-Derived Products        Nursing Notes Reviewed    Physical Exam:  Triage VS:    ED Triage Vitals [12/29/22 1550]   Enc Vitals Group      /84      Heart Rate 99      Resp 18      Temp 98.9 °F (37.2 °C)      Temp Source Oral      SpO2 97 %      Weight       Height       Head Circumference       Peak Flow       Pain Score       Pain Loc       Pain Edu? Excl. in [address]? My pulse ox interpretation is - normal    General appearance:  No acute distress. Skin:  Warm. Dry. Eye:  Extraocular movements intact.      Ears, nose, mouth and throat:  Oral mucosa moist   Neck:  Trachea midline. Extremity:  No obvious deformity, erythema, or warmth. No swelling. Normal ROM. Distal pulses intact. Heart:  Regular rate and rhythm, normal S1 & S2, no extra heart sounds. Perfusion:  intact  Respiratory:  Lungs clear to auscultation bilaterally. Respirations nonlabored. Abdominal:  Normal bowel sounds. Soft. Nontender. Non distended. Back:  No CVA tenderness to palpation     Neurological:  Alert and oriented times 3. No focal neuro deficits. Psychiatric:  Appropriate    I have reviewed and interpreted all of the currently available lab results from this visit (if applicable):  Results for orders placed or performed during the hospital encounter of 12/29/22   Lipase   Result Value Ref Range    Lipase 28 13 - 60 IU/L   Magnesium   Result Value Ref Range    Magnesium 2.0 1.8 - 2.4 mg/dl   Urinalysis   Result Value Ref Range    Color, UA YELLOW YELLOW    Clarity, UA CLEAR CLEAR    Glucose, Urine NEGATIVE NEGATIVE MG/DL    Bilirubin Urine NEGATIVE NEGATIVE MG/DL    Ketones, Urine NEGATIVE NEGATIVE MG/DL    Specific Gravity, UA <1.005 1.001 - 1.035    Blood, Urine NEGATIVE NEGATIVE    pH, Urine 6.0 5.0 - 8.0    Protein, UA NEGATIVE NEGATIVE MG/DL    Urobilinogen, Urine 0.2 0.2 - 1.0 MG/DL    Nitrite Urine, Quantitative NEGATIVE NEGATIVE    Leukocyte Esterase, Urine NEGATIVE NEGATIVE    Urinalysis Comments       Microscopic exam not performed based on chemical results unless requested in original order.    Drug screen multi urine   Result Value Ref Range    Cannabinoid Scrn, Ur UNCONFIRMED POSITIVE (A) NEGATIVE    Amphetamines NEGATIVE NEGATIVE    Cocaine Metabolite NEGATIVE NEGATIVE    Benzodiazepine Screen, Urine NEGATIVE NEGATIVE    Barbiturate Screen, Ur NEGATIVE NEGATIVE    Opiates, Urine NEGATIVE NEGATIVE    Phencyclidine, Urine NEGATIVE NEGATIVE    Oxycodone NEGATIVE NEGATIVE   BMP   Result Value Ref Range    Sodium 139 135 - 145 MMOL/L    Potassium 3.9 3.5 - 5.1 MMOL/L    Chloride 99 99 - 110 mMol/L    CO2 28 21 - 32 MMOL/L    Anion Gap 12 4 - 16    BUN 11 6 - 23 MG/DL    Creatinine 0.9 0.9 - 1.3 MG/DL    Est, Glom Filt Rate >60 >60 mL/min/1.73m2    Glucose 85 70 - 99 MG/DL    Calcium 9.5 8.3 - 10.6 MG/DL   CBC with Auto Differential   Result Value Ref Range    WBC 7.1 4.0 - 10.5 K/CU MM    RBC 4.52 (L) 4.6 - 6.2 M/CU MM    Hemoglobin 14.1 13.5 - 18.0 GM/DL    Hematocrit 42.8 42 - 52 %    MCV 94.7 78 - 100 FL    MCH 31.2 (H) 27 - 31 PG    MCHC 32.9 32.0 - 36.0 %    RDW 12.2 11.7 - 14.9 %    Platelets 399 888 - 456 K/CU MM    MPV 9.8 7.5 - 11.1 FL    Differential Type AUTOMATED DIFFERENTIAL     Segs Relative 67.4 (H) 36 - 66 %    Lymphocytes % 22.7 (L) 24 - 44 %    Monocytes % 8.7 (H) 0 - 4 %    Eosinophils % 0.6 0 - 3 %    Basophils % 0.3 0 - 1 %    Segs Absolute 4.8 K/CU MM    Lymphocytes Absolute 1.6 K/CU MM    Monocytes Absolute 0.6 K/CU MM    Eosinophils Absolute 0.0 K/CU MM    Basophils Absolute 0.0 K/CU MM    Nucleated RBC % 0.0 %    Total Nucleated RBC 0.0 K/CU MM    Total Immature Neutrophil 0.02 K/CU MM    Immature Neutrophil % 0.3 0 - 0.43 %   Hepatic Function Panel   Result Value Ref Range    Albumin 4.7 3.4 - 5.0 GM/DL    Total Bilirubin 0.5 0.0 - 1.0 MG/DL    Bilirubin, Direct 0.2 0.0 - 0.3 MG/DL    Bilirubin, Indirect 0.3 0 - 0.7 MG/DL    Alkaline Phosphatase 73 40 - 129 IU/L    AST 20 15 - 37 IU/L    ALT 13 10 - 40 U/L    Total Protein 7.6 6.4 - 8.2 GM/DL   EKG 12 Lead   Result Value Ref Range    Ventricular Rate 69 BPM    Atrial Rate 69 BPM    P-R Interval 112 ms    QRS Duration 92 ms    Q-T Interval 378 ms    QTc Calculation (Bazett) 405 ms    P Axis -6 degrees    R Axis 44 degrees    T Axis 34 degrees    Diagnosis       Normal sinus rhythm with sinus arrhythmia  Voltage criteria for left ventricular hypertrophy  Nonspecific ST abnormality  Abnormal ECG  When compared with ECG of 26-DEC-2022 21:34,  No significant change was found        Radiographs (if obtained):  Radiologist's Report Reviewed:  CT ABDOMEN PELVIS WO CONTRAST Additional Contrast? None   Final Result   Bowel findings may reflect an acute nonspecific enteritis or enterocolitis. Correlate clinically. No other acute finding in the abdomen or pelvis. EKG (if obtained): (All EKG's are interpreted by myself in the absence of a cardiologist)    Normal sinus rhythm with sinus arrhythmia, LVH  No STEMI  EKG interpreted by me pending cardiology report. MDM:  57-year-old male who presented to the ED with a history of abdominal pain. Patient has been to the ED multiple times for similar symptoms. This is the 10th visit in about 2 weeks for the same symptoms. Patient has been given Bentyl and Toradol, has had a CT scan of the abdomen and has been discharged with prescription medications for which patient has not been taking as prescribed. Patient has also been to different ERs including [facility] for similar symptoms. Today patient insists that he has abdominal pain but is unwilling to have a gastroenterology referral or surgery outpatient referral.  Patient insists that nobody is taking him serious. No history of fever, back pain, dysuria, nausea or vomiting. Patient admits to occasional diarrhea; nonbloody diarrhea. No history of constipation. Physical examination is unremarkable. There is no palpable abdominal tenderness.  examination is also unremarkable. Patient has unremarkable CBC, electrolytes, lipase and urinalysis. UDS is significant for cannabinoids use. EKG is unremarkable for any ischemic concerns. EKG was interpreted by me in lieu of a cardiologist.    CT scan of the abdomen is significant for enterocolitis. Exam patient given Bentyl and Toradol in the ED. Patient requested for and is given a lunch box, applesauce and fruit juice which he ate without difficulty. No nausea/vomiting.     Patient is discharged with primary care outpatient follow-up as well as antibiotics; metronidazole, as well as famotidine p.o. Patient advised to follow-up with his other prescription medications including Bentyl and Zofran. Patient is advised that multiple ED visits for the same symptoms will not lead to any significant change in management for abdominal pain unless there is a different kind of presentation. Patient also advised that he has to follow-up with primary care and follow-up his prescription medications before returning to the ED for the same symptoms. Patient given return instructions to the ED. Patient given opportunity ask questions and all questions answered in appropriate detail. Patient verbalized understanding agreement with the plan. Clinical Impression:  1. Enterocolitis    2. Abdominal pain, unspecified abdominal location      Disposition referral (if applicable):  Marcie Galvan  [address] [place] [id number]  [phone]    Schedule an appointment as soon as possible for a visit in 1 day    Disposition medications (if applicable):  New Prescriptions    FAMOTIDINE (PEPCID) 20 MG TABLET    Take 1 tablet by mouth 2 times daily for 14 days    METRONIDAZOLE (FLAGYL) 500 MG TABLET    Take 1 tablet by mouth 3 times daily for 5 days     ED Provider Disposition Time  DISPOSITION Decision To Discharge 12/29/2022 10:47:29 PM      Comment: Please note this report has been produced using speech recognition software and may contain errors related to that system including errors in grammar, punctuation, and spelling, as well as words and phrases that may be inappropriate. Efforts were made to edit the dictations.         [facility], DO  12/30/22 2065

## 2022-12-30 ENCOUNTER — HOSPITAL ENCOUNTER (EMERGENCY)
Age: 32
Discharge: HOME | End: 2022-12-30
Payer: COMMERCIAL

## 2022-12-30 VITALS
HEIGHT: 63 IN | WEIGHT: 131 LBS | RESPIRATION RATE: 18 BRPM | DIASTOLIC BLOOD PRESSURE: 90 MMHG | OXYGEN SATURATION: 97 % | SYSTOLIC BLOOD PRESSURE: 122 MMHG | HEART RATE: 85 BPM | BODY MASS INDEX: 23.21 KG/M2 | TEMPERATURE: 98.3 F

## 2022-12-30 DIAGNOSIS — R11.2 NAUSEA AND VOMITING, UNSPECIFIED VOMITING TYPE: ICD-10-CM

## 2022-12-30 DIAGNOSIS — R19.7 DIARRHEA, UNSPECIFIED TYPE: Primary | ICD-10-CM

## 2022-12-30 LAB
ALBUMIN SERPL-MCNC: 5.6 GM/DL (ref 3.4–5)
ALP BLD-CCNC: 94 IU/L (ref 40–129)
ALT SERPL-CCNC: 18 U/L (ref 10–40)
ANION GAP SERPL CALCULATED.3IONS-SCNC: 12 MMOL/L (ref 4–16)
AST SERPL-CCNC: 30 IU/L (ref 15–37)
BACTERIA: NEGATIVE /HPF
BASOPHILS ABSOLUTE: 0 K/CU MM
BASOPHILS RELATIVE PERCENT: 0.2 % (ref 0–1)
BILIRUB SERPL-MCNC: 0.9 MG/DL (ref 0–1)
BILIRUBIN URINE: NEGATIVE MG/DL
BLOOD, URINE: NEGATIVE
BUN BLDV-MCNC: 12 MG/DL (ref 6–23)
CALCIUM SERPL-MCNC: 10.3 MG/DL (ref 8.3–10.6)
CHLORIDE BLD-SCNC: 98 MMOL/L (ref 99–110)
CLARITY: CLEAR
CO2: 30 MMOL/L (ref 21–32)
COLOR: YELLOW
CREAT SERPL-MCNC: 0.9 MG/DL (ref 0.9–1.3)
DIFFERENTIAL TYPE: ABNORMAL
EOSINOPHILS ABSOLUTE: 0.1 K/CU MM
EOSINOPHILS RELATIVE PERCENT: 0.6 % (ref 0–3)
GFR SERPL CREATININE-BSD FRML MDRD: >60 ML/MIN/1.73M2
GLUCOSE BLD-MCNC: 102 MG/DL (ref 70–99)
GLUCOSE, URINE: NEGATIVE MG/DL
HCT VFR BLD CALC: 51.2 % (ref 42–52)
HEMOGLOBIN: 16.5 GM/DL (ref 13.5–18)
IMMATURE NEUTROPHIL %: 0.4 % (ref 0–0.43)
KETONES, URINE: 15 MG/DL
LEUKOCYTE ESTERASE, URINE: ABNORMAL
LIPASE: 37 IU/L (ref 13–60)
LYMPHOCYTES ABSOLUTE: 1.5 K/CU MM
LYMPHOCYTES RELATIVE PERCENT: 17.9 % (ref 24–44)
MCH RBC QN AUTO: 31 PG (ref 27–31)
MCHC RBC AUTO-ENTMCNC: 32.2 % (ref 32–36)
MCV RBC AUTO: 96.1 FL (ref 78–100)
MONOCYTES ABSOLUTE: 0.5 K/CU MM
MONOCYTES RELATIVE PERCENT: 5.8 % (ref 0–4)
MUCUS: ABNORMAL HPF
NITRITE URINE, QUANTITATIVE: NEGATIVE
NUCLEATED RBC %: 0 %
PDW BLD-RTO: 12.4 % (ref 11.7–14.9)
PH, URINE: 5.5 (ref 5–8)
PLATELET # BLD: 272 K/CU MM (ref 140–440)
PMV BLD AUTO: 9.6 FL (ref 7.5–11.1)
POTASSIUM SERPL-SCNC: 4.2 MMOL/L (ref 3.5–5.1)
PROTEIN UA: NEGATIVE MG/DL
RBC # BLD: 5.33 M/CU MM (ref 4.6–6.2)
RBC URINE: 1 /HPF (ref 0–3)
SEGMENTED NEUTROPHILS ABSOLUTE COUNT: 6.1 K/CU MM
SEGMENTED NEUTROPHILS RELATIVE PERCENT: 75.1 % (ref 36–66)
SODIUM BLD-SCNC: 140 MMOL/L (ref 135–145)
SPECIFIC GRAVITY UA: 1.02 (ref 1–1.03)
SQUAMOUS EPITHELIAL: <1 /HPF
TOTAL IMMATURE NEUTOROPHIL: 0.03 K/CU MM
TOTAL NUCLEATED RBC: 0 K/CU MM
TOTAL PROTEIN: 9.7 GM/DL (ref 6.4–8.2)
UROBILINOGEN, URINE: 0.2 MG/DL (ref 0.2–1)
WBC # BLD: 8.1 K/CU MM (ref 4–10.5)
WBC UA: 2 /HPF (ref 0–2)

## 2022-12-30 PROCEDURE — 99283 EMERGENCY DEPT VISIT LOW MDM: CPT | Performed by: PHYSICIAN ASSISTANT

## 2022-12-30 PROCEDURE — 80053 COMPREHEN METABOLIC PANEL: CPT

## 2022-12-30 PROCEDURE — 85025 COMPLETE CBC W/AUTO DIFF WBC: CPT

## 2022-12-30 PROCEDURE — 83690 ASSAY OF LIPASE: CPT

## 2022-12-30 PROCEDURE — 87040 BLOOD CULTURE FOR BACTERIA: CPT

## 2022-12-30 PROCEDURE — 81003 URINALYSIS AUTO W/O SCOPE: CPT

## 2022-12-30 RX ORDER — ONDANSETRON 4 MG/1
4 TABLET, FILM COATED ORAL EVERY 8 HOURS PRN
Qty: 10 TABLET | Refills: 0 | Status: SHIPPED | OUTPATIENT
Start: 2022-12-30

## 2022-12-30 ASSESSMENT — PAIN DESCRIPTION - PAIN TYPE: TYPE: ACUTE PAIN

## 2022-12-30 ASSESSMENT — PAIN DESCRIPTION - DESCRIPTORS: DESCRIPTORS: DULL;STABBING

## 2022-12-30 ASSESSMENT — PAIN SCALES - GENERAL: PAINLEVEL_OUTOF10: 10

## 2022-12-30 ASSESSMENT — PAIN DESCRIPTION - LOCATION: LOCATION: ABDOMEN

## 2022-12-30 NOTE — ED NOTES
Report given to Marin monroe RN. Care transferred at this time.       Jose Daniel Lock RN  12/29/22 1925

## 2022-12-30 NOTE — DISCHARGE INSTRUCTIONS
Please call a primary care provider this week to schedule an appointment within the next 1-2 weeks for re-evaluation and to discuss your cat scan findings. Meanwhile, you can use your prescriptions  (zofran for nausea, flagyl for intestinal infection.)    Return to ED with any passingout, uncontrollable pain, worsening symptoms or any new concerns.

## 2022-12-30 NOTE — DISCHARGE INSTRUCTIONS
Follow-up with primary care and gastroenterology per referral  Take physician medications as prescribed.   Take OTC medication as needed for pain

## 2022-12-30 NOTE — ED NOTES
Pt moved to waiting room to wait for case management to come talk with him.      Shirley Albrecht RN  12/30/22 [id number]

## 2022-12-30 NOTE — ED PROVIDER NOTES
[address] ENCOUNTER        Pt Name: Karen Morris  MRN: [id number]  [place] 1990  Date of evaluation: 12/30/2022  Provider: Gee Freire PA-C  PCP: Michelle Molina  Note Started: 12:18 PM EST      TALA. I have evaluated this patient. My supervising physician was available for consultation. Triage CHIEF COMPLAINT       Chief Complaint   Patient presents with    Abdominal Pain     Complaining of abdominal pain and nausea         HISTORY OF PRESENT ILLNESS   (Location/Symptom, Timing/Onset, Context/Setting, Quality, Duration, Modifying Factors, Severity)  Note limiting factors. Chief Complaint: not feeling well    Karen Morris is a 27 y.o. male who presents with c/o not feeling well. Mentioning n/v, chills, concern for fever, sick; mentions he vomited three times today. Was seen yesterday but hasnot filled medications. Symptoms have been ongoing for 29th. Has had diarrhea. Has h/o of rectal prolapse. My discussion with him, he feels that may be prolapsed again. Denies: bloody stools, dysuria, hematuria, hematemesis. Nursing Notes were all reviewed and agreed with or any disagreements were addressed in the HPI. REVIEW OF SYSTEMS    (2-9 systems for level 4, 10 or more for level 5)     Review of Systems   Constitutional:  Positive for fever. Negative for chills. HENT:  Negative for congestion and rhinorrhea. Eyes:  Negative for pain and visual disturbance. Respiratory:  Negative for cough and shortness of breath. Cardiovascular:  Negative for chest pain and leg swelling. Gastrointestinal:  Positive for nausea and vomiting. Negative for abdominal pain, diarrhea and rectal pain. Genitourinary:  Negative for dysuria and hematuria. Musculoskeletal:  Negative for back pain and myalgias. Skin:  Negative for rash and wound. Neurological:  Negative for dizziness and light-headedness. PAST MEDICAL HISTORY     Past Medical History:   Diagnosis Date    Bipolar 1 disorder ([facility]     Chronic generalized abdominal pain     Depression     GERD (gastroesophageal reflux disease)     IBS (irritable bowel syndrome)     Mental impairment     OCD (obsessive compulsive disorder)     Schizo affective schizophrenia ([facility]        SURGICAL HISTORY     Past Surgical History:   Procedure Laterality Date    COLONOSCOPY      does not know    EGD      does not know       CURRENTMEDICATIONS       Discharge Medication List as of 12/30/2022 12:48 PM        CONTINUE these medications which have NOT CHANGED    Details   famotidine (PEPCID) 20 MG tablet Take 1 tablet by mouth 2 times daily for 14 days, Disp-28 tablet, R-0Normal      metroNIDAZOLE (FLAGYL) 500 MG tablet Take 1 tablet by mouth 3 times daily for 5 days, Disp-15 tablet, R-0Normal      dicyclomine (BENTYL) 10 MG capsule Take 1 capsule by mouth 4 times daily for 10 days, Disp-40 capsule, R-0Normal      aspirin 81 MG EC tablet Take 81 mg by mouth dailyHistorical Med      Fesoterodine Fumarate ER 8 MG TB24 Take 8 mg by mouth every eveningHistorical Med             ALLERGIES     Latex, Adhesive tape, Codeine, Iodine, and Shellfish-derived products    FAMILYHISTORY       Family History   Problem Relation Age of Onset    Colon Cancer Neg Hx     Esophageal Cancer Neg Hx     Liver Cancer Neg Hx     Rectal Cancer Neg Hx     Stomach Cancer Neg Hx         SOCIAL HISTORY       Social History     Socioeconomic History    Marital status: Unknown     Spouse name: None    Number of children: None    Years of education: None    Highest education level: None   Tobacco Use    Smoking status: Never    Smokeless tobacco: Never   Substance and Sexual Activity    Alcohol use: Never    Drug use: Never       SCREENINGS    Jose Coma Scale  Eye Opening: Spontaneous  Best Verbal Response: Oriented  Best Motor Response: Obeys commands  Jose Coma Scale Score: 15        PHYSICAL EXAM (up to 7 for level 4, 8 or more for level 5)     ED Triage Vitals [12/30/22 1100]   BP Temp Temp Source Heart Rate Resp SpO2 Height Weight   113/83 98.3 °F (36.8 °C) Oral (!) 112 18 100 % 5' 3\" (1.6 m) 131 lb (59.4 kg)       Physical Exam  Vitals and nursing note reviewed. Constitutional:       Appearance: Normal appearance. He is well-developed. He is not ill-appearing or diaphoretic. HENT:      Head: Normocephalic and atraumatic. Eyes:      General: No scleral icterus. Right eye: No discharge. Left eye: No discharge. Cardiovascular:      Rate and Rhythm: Normal rate and regular rhythm. Heart sounds: Normal heart sounds. No murmur heard. No friction rub. No gallop. Pulmonary:      Effort: Pulmonary effort is normal. No respiratory distress. Breath sounds: Normal breath sounds. No stridor. No wheezing or rales. Chest:      Chest wall: No tenderness. Abdominal:      General: Bowel sounds are normal. There is no distension. Palpations: Abdomen is soft. There is no mass. Tenderness: There is no abdominal tenderness. There is no guarding or rebound. Musculoskeletal:         General: No tenderness. Normal range of motion. Cervical back: Normal range of motion and neck supple. Skin:     General: Skin is warm and dry. Coloration: Skin is not pale. Neurological:      Mental Status: He is alert and oriented to person, place, and time. Coordination: Coordination normal.   Psychiatric:         Mood and Affect: Mood normal.         Behavior: Behavior normal.       EMERGENCY DEPARTMENT COURSE :             Pt is a [address] y.o. male who presents with above HPI. Initial work-up ordered and peripheral IV order placed based on chief complaint, nursing notes. Additionally patient seen recently.       Patient was given thefollowing medications:  Medications - No data to display        DIFFERENTIAL DIAGNOSIS/MDM:       Work-up reviewed, lab work is reassuring, COVID and influenza tests are negative. His vitals remain within normal limits. On examination he is well in appearance conversing normally, answering questions appropriately, no evidence of disorganized thoughts or confusion. Alert and oriented. He has been noticed to be ambulatory in the emergency department. Abdomen soft nontender no guarding or rebound no CVA tenderness. Patient with bilateral knee scrotal or groin fold lung sounds are clear. Normal posterior oropharynx with no concerning signs of any respiratory distress, airway compromise, stridor. Heart regular rate and rhythm. Patient apparently does have a history of rectal prolapse I mentioned this to him, he is denying any rectal pain but does feel that it may have been prolapsed. On my examination, no noted mass or evidence of any rectal prolapse, or perirectal peril anal infectious process. Patient was initially tachycardic, however repeat assessment vitals within normal limits. Suspect initial tachycardia was related to patient's movement prior to Assessment. At this point given reassuring work-up, reassuring examination, and recent work-up yesterday I do not feel a repeat CT abdomen pelvis is warranted. Patient CT yesterday did show possible enteritis, and this does appear to be consistent with patient's symptoms. Patient did voice concern for outpatient management, and voiced his preference for inpatient. I do not feel he warrants inpatient admission. During this discussion, he did discuss concern for no primary care provider, hand \"nowhere else to go\". Case management was consulted to assist with local resources. Afterwards I did have discussion with ED , who is mentioning patient has limited desire to speak with her.       Differential diagnosis would include enteritis, colitis, gastroenteritis, peptic ulcer disease, plantar bowel disease, irritable bowel syndrome, viral syndrome, malingering, hemorrhoids, rectal prolapse, renal colic. I estimate there is LOW risk for (including but not limited to) testicular torsion, soft tissue infection, ACUTE APPENDICITIS, BOWEL OBSTRUCTION, ACUTE CHOLECYSTITIS, RUPTURED DIVERTICULITIS, INCARCERATED or STRANGULATED HERNIA, HEMMORHAGIC PANCREATITIS, or PERFORATED BOWEL/ULCER, thus I consider the discharge disposition reasonable. Sheila Anjum Esteban (or their surrogate) and I have discussed the diagnosis and risks, and we agree with discharging home with close follow-up. We also discussed returning to the Emergency Department immediately if new or worsening symptoms occur. We have discussed the symptoms which are most concerning that necessitate immediate return. Is this patient to be included in the SEP-1 Core Measure due to severe sepsis or septic shock?    No   Exclusion criteria - the patient is NOT to be included for SEP-1 Core Measure due to:  2+ SIRS criteria are not met    Vitals:    Vitals:    12/30/22 1100 12/30/22 1227 12/30/22 1231 12/30/22 1232   BP: 113/83 114/84  (!) 122/90   Pulse: (!) 112 85     Resp: 18 18     Temp: 98.3 °F (36.8 °C)      TempSrc: Oral      SpO2: 100% 100% 100% 97%   Weight: 131 lb (59.4 kg)      Height: 5' 3\" (1.6 m)          CONSULTS:   IP CONSULT TO CASE MANAGEMENT     CRITICAL CARE TIME   N/A      DIAGNOSTIC RESULTS   LABS:    Labs Reviewed   CBC WITH AUTO DIFFERENTIAL - Abnormal; Notable for the following components:       Result Value    Segs Relative 75.1 (*)     Lymphocytes % 17.9 (*)     Monocytes % 5.8 (*)     All other components within normal limits   COMPREHENSIVE METABOLIC PANEL - Abnormal; Notable for the following components:    Chloride 98 (*)     Glucose 102 (*)     Albumin 5.6 (*)     Total Protein 9.7 (*)     All other components within normal limits   URINALYSIS WITH REFLEX TO CULTURE - Abnormal; Notable for the following components:    Ketones, Urine 15 (*)     Leukocyte Esterase, Urine TRACE (*)     Mucus, UA RARE (*)     All other components within normal limits   CULTURE, BLOOD 1    Narrative:     SETUP DATE/TIME:  12/30/2022 1150   CULTURE, BLOOD 2    Narrative:     SETUP DATE/TIME:  12/30/2022 1217   LIPASE   URINE MICROSCOPIC WITH REFLEX TO CULTURE       When ordered, only abnormal lab results are displayed. All other labs were within normal range or not returned as of this dictation. EKG: When ordered, EKG's are interpreted by the Emergency Department Physician in the absence of a cardiologist.  Please see their note for interpretation of EKG. RADIOLOGY:   Non-plain film images such as CT, Ultrasound and MRI are read by the radiologist. Plain radiographic images are visualized andpreliminarily interpreted by the  ED Provider with the below findings:      Interpretation [facility] Radiologist below, if available at the time of this note:    No orders to display     No results found. PROCEDURES   Unless otherwise noted below, none     Procedures      FINAL IMPRESSION      1. Diarrhea, unspecified type    2.  Nausea and vomiting, unspecified vomiting type          DISPOSITION/PLAN   DISPOSITION Decision To Discharge 12/30/2022 12:46:50 PM      PATIENT REFERREDTO:  Maribel and Litzy  [phone]  Schedule an appointment as soon as possible for a visit       Miguel Hopper  [address] [place] [address] [phone]          DISCHARGE MEDICATIONS:  Discharge Medication List as of 12/30/2022 12:48 PM          DISCONTINUED MEDICATIONS:  Discharge Medication List as of 12/30/2022 12:48 PM                 (Please note that portions ofthis note were completed with a voice recognition program.  Efforts were made to edit the dictations but occasionally words are mis-transcribed.)    Funmilayo Saab PA-C (electronically signed)                    Funmilayo Saab PA-C  01/03/23 6727

## 2022-12-30 NOTE — ED NOTES
This RN in at bedside with MERCY Hampton pt talking loudly and is upset about wanting to be admitted to hospital and SARAH Hampton is attempting to explain that patient does not meet criteria to be admitted to hospital. Pt repeatedly saying \"I dont feel good, I'm telling the truth, I'm not lying\". Pt was informed medications were sent to [facility] aid pharmacy across the street after his visit yesterday.  Pt states he is not going to get the medications because they will not help him     Nikki Umana, RN  12/30/22 uLla Lee, RN  12/30/22 3873

## 2022-12-30 NOTE — ED NOTES
Case management talking with patient and reports patient got upset and said he was going to go to [place] and was seen ambulating out of ED with gait steady     Shantal Norris RN  12/30/22 2320

## 2022-12-30 NOTE — CARE COORDINATION
CM was consulted for PCP and area resources. CM gathered resources and asked pt to go in triage room. CM started to talk to pt about PCP's and pt became upset and told CM that he should be admitted he is losing weight and its not because he's not eating. Pt states he meets criteria to be admitted he doesn't know why no one will believe him and admit him to the hospital. Pt then walked out of the ed still talking about how he should be admitted. Pt has had 17 ed visits in December between [facility] and [facility]. 14 of those have been for abdominal pain. Pt lives at the [facility] currently. Notes state pt has been medically cleared each visit, does not follow through with any outpt care and has told nursing he is not getting meds because they wont help him. CM updated Ra NGUYEN that pt left.

## 2022-12-30 NOTE — ED NOTES
Pt reports he had an \"accident\" requesting to be changed. Pt advised that he is able to walk and will be able to get up and change out of his clothing and into a gown once BR is vacated.      Carmela Meza RN  12/29/22 2028

## 2022-12-31 LAB
EKG ATRIAL RATE: 69 BPM
EKG DIAGNOSIS: NORMAL
EKG P AXIS: -6 DEGREES
EKG P-R INTERVAL: 112 MS
EKG Q-T INTERVAL: 378 MS
EKG QRS DURATION: 92 MS
EKG QTC CALCULATION (BAZETT): 405 MS
EKG R AXIS: 44 DEGREES
EKG T AXIS: 34 DEGREES
EKG VENTRICULAR RATE: 69 BPM

## 2022-12-31 PROCEDURE — 93010 ELECTROCARDIOGRAM REPORT: CPT | Performed by: INTERNAL MEDICINE

## 2023-01-01 LAB
CULTURE: NORMAL
CULTURE: NORMAL
Lab: NORMAL
Lab: NORMAL
SPECIMEN: NORMAL
SPECIMEN: NORMAL

## 2023-01-03 ASSESSMENT — ENCOUNTER SYMPTOMS
RECTAL PAIN: 0
VOMITING: 1
NAUSEA: 1
ABDOMINAL PAIN: 0
EYE PAIN: 0
BACK PAIN: 0
SHORTNESS OF BREATH: 0
COUGH: 0
RHINORRHEA: 0
DIARRHEA: 0

## 2023-01-04 LAB
CULTURE: NORMAL
CULTURE: NORMAL
Lab: NORMAL
Lab: NORMAL
SPECIMEN: NORMAL
SPECIMEN: NORMAL

## 2023-02-09 ENCOUNTER — APPOINTMENT (OUTPATIENT)
Dept: GENERAL RADIOLOGY | Age: 33
End: 2023-02-09
Payer: COMMERCIAL

## 2023-02-09 ENCOUNTER — HOSPITAL ENCOUNTER (EMERGENCY)
Age: 33
Discharge: HOME | End: 2023-02-09
Attending: EMERGENCY MEDICINE
Payer: COMMERCIAL

## 2023-02-09 VITALS
TEMPERATURE: 98 F | DIASTOLIC BLOOD PRESSURE: 59 MMHG | OXYGEN SATURATION: 100 % | RESPIRATION RATE: 12 BRPM | HEART RATE: 54 BPM | SYSTOLIC BLOOD PRESSURE: 98 MMHG

## 2023-02-09 DIAGNOSIS — R07.9 CHEST PAIN, UNSPECIFIED TYPE: Primary | ICD-10-CM

## 2023-02-09 DIAGNOSIS — Z59.00 HOMELESSNESS: ICD-10-CM

## 2023-02-09 LAB
ALBUMIN SERPL-MCNC: 4.8 GM/DL (ref 3.4–5)
ALP BLD-CCNC: 83 IU/L (ref 40–128)
ALT SERPL-CCNC: 16 U/L (ref 10–40)
ANION GAP SERPL CALCULATED.3IONS-SCNC: 7 MMOL/L (ref 4–16)
AST SERPL-CCNC: 22 IU/L (ref 15–37)
BILIRUB SERPL-MCNC: 0.3 MG/DL (ref 0–1)
BUN SERPL-MCNC: 13 MG/DL (ref 6–23)
CALCIUM SERPL-MCNC: 9.3 MG/DL (ref 8.3–10.6)
CHLORIDE BLD-SCNC: 101 MMOL/L (ref 99–110)
CO2: 30 MMOL/L (ref 21–32)
CREAT SERPL-MCNC: 0.8 MG/DL (ref 0.9–1.3)
EKG ATRIAL RATE: 83 BPM
EKG DIAGNOSIS: NORMAL
EKG P AXIS: 57 DEGREES
EKG P-R INTERVAL: 120 MS
EKG Q-T INTERVAL: 364 MS
EKG QRS DURATION: 94 MS
EKG QTC CALCULATION (BAZETT): 427 MS
EKG R AXIS: 49 DEGREES
EKG T AXIS: 51 DEGREES
EKG VENTRICULAR RATE: 83 BPM
GFR SERPL CREATININE-BSD FRML MDRD: >60 ML/MIN/1.73M2
GLUCOSE SERPL-MCNC: 95 MG/DL (ref 70–99)
HCT VFR BLD CALC: 44 % (ref 42–52)
HEMOGLOBIN: 14.3 GM/DL (ref 13.5–18)
MCH RBC QN AUTO: 31.2 PG (ref 27–31)
MCHC RBC AUTO-ENTMCNC: 32.5 % (ref 32–36)
MCV RBC AUTO: 96.1 FL (ref 78–100)
PDW BLD-RTO: 12 % (ref 11.7–14.9)
PLATELET # BLD: 266 K/CU MM (ref 140–440)
PMV BLD AUTO: 9.7 FL (ref 7.5–11.1)
POTASSIUM SERPL-SCNC: 4.2 MMOL/L (ref 3.5–5.1)
RBC # BLD: 4.58 M/CU MM (ref 4.6–6.2)
SODIUM BLD-SCNC: 138 MMOL/L (ref 135–145)
TOTAL PROTEIN: 7.6 GM/DL (ref 6.4–8.2)
TROPONIN T: <0.01 NG/ML
WBC # BLD: 7.9 K/CU MM (ref 4–10.5)

## 2023-02-09 PROCEDURE — 93005 ELECTROCARDIOGRAM TRACING: CPT | Performed by: NURSE PRACTITIONER

## 2023-02-09 PROCEDURE — 99285 EMERGENCY DEPT VISIT HI MDM: CPT

## 2023-02-09 PROCEDURE — 93010 ELECTROCARDIOGRAM REPORT: CPT | Performed by: INTERNAL MEDICINE

## 2023-02-09 PROCEDURE — 84484 ASSAY OF TROPONIN QUANT: CPT

## 2023-02-09 PROCEDURE — 85027 COMPLETE CBC AUTOMATED: CPT

## 2023-02-09 PROCEDURE — 80053 COMPREHEN METABOLIC PANEL: CPT

## 2023-02-09 PROCEDURE — 71046 X-RAY EXAM CHEST 2 VIEWS: CPT

## 2023-02-09 RX ORDER — FAMOTIDINE 20 MG/1
20 TABLET, FILM COATED ORAL 2 TIMES DAILY
Qty: 28 TABLET | Refills: 0 | Status: SHIPPED | OUTPATIENT
Start: 2023-02-09 | End: 2023-02-23

## 2023-02-09 RX ORDER — ONDANSETRON 4 MG/1
4 TABLET, FILM COATED ORAL EVERY 8 HOURS PRN
Qty: 10 TABLET | Refills: 0 | Status: SHIPPED | OUTPATIENT
Start: 2023-02-09

## 2023-02-09 SDOH — ECONOMIC STABILITY - HOUSING INSECURITY: HOMELESSNESS UNSPECIFIED: Z59.00

## 2023-02-09 NOTE — ED NOTES
Patient discharging home, AVS reviewed with no questions at this time. Patient left prior to receiving discharge instructions. Respirations equal and unlabored, skin PWD.           Henry Mcgowan RN  02/09/23 7341

## 2023-02-09 NOTE — DISCHARGE INSTRUCTIONS
Resources for [facility] [place]  [address] [facility]   [address]   [email or web address]  [phone]    Services:  Provides food bank services to Anup Rodarte, and [facility]    Hours:  Monday 9am - 4pm  Tuesday through Friday 8am - 4pm        [facility]   [address] [facility]  [address]  [phone]    Hours:  Every Monday and Wednesday 5 - 6pm        [facility] Food Pantry  [address], [address] [facility]  www. [facility]. org  [phone]     Services:   Provides food assistance to those who need it. Will serve once a month. Bring picture I.D. and Social Security number for all household members. Hours:   Pantry is open the 3rd Wednesday of each month from [address] [place] [address] 1, [address] [facility] [address]  www.[facility]. org  [phone]    Services:  Provides groceries and a hot meal for those in need. Bring picture I.D. and Social Security number for all household members. Hours:  2nd & 3rd Saturday of each month   Breakfast is served at 9:30am. - 10:00am   Food pantry is open from 10:00am - 11:00am        [facility], [address] [facility], [address] [place] [address]  [email or web address]  [phone]    Services:   Operates food pantry   Provides free clothing, when available    Hours:  Food Pantry: Monday and Wednesday from 12:00-1:00.    (Sign up is at 11:45)  Hot meals are offered Tuesday and Thursday at 11:00.    (Sign up is from 8:30-10:30)  Sandwiches are also served Tuesday and Thursday at 10:30.    (Sign up is from 8:30-10:30)        [address] [place] [address].    Diallo Burris, [address] [place] [address]  [phone]    Services:  Walk-in food pantry  Requires picture ID and proof of residency    Hours:  2nd and 4th Saturdays from 10:00am - 12:00pm        [facility] AND [facility]  [facility], [address] [place] [address]  [phone]     Services:  Provides emergency food and clothing to people who are in need  Requires picture I.D. & a Social Security number    Hours:  Saturdays from [facility] (food/clothing distribution)  Last 2 Tuesdays of each month from 1pm - 3pm (food/clothing distribution)  Every Thursday evening at 5:30pm (hot meal)        [address], [address]  [email or web address]  [phone]:  Food pantry for those in need in the [address] [place] [address]. Requires Photo ID    Hours: Third Thursday of each month from 5:30pm - 7:30pm        [facility] of [address] [facility] 1501 [facility], [address] [facility]  [email or web address]. Free[employer] Tag  [phone]    Services:  Provides non-perishable food and clothing to residents of [facility].  is available most open days. Prydeinig speaking volunteers usually available    Hours:  2nd & 4th Saturdays from 9am to 12pm.        [employer]  [address]  [email or web address]  [phone]    Services:  Provides food for those in need. Provides adult, children & baby clothes as well as small household items, shoes, toys and books depending upon availability. Requires ID for everyone in household (provided at each visit) and proof of correct address. Hours:   Tuesdays 10:00 am- 1:00 pm   Thursdays 2:30 pm - 5:00 pm   Saturdays 9:00 am-12:00 pm        [employer]  [address] [facility], [address] [place] [address]  www. [place]. BeauCooo. org  [phone]:  Provides food assistance to [facility] residents in need  May also provide hygiene items  May offer assistance with emergency rent   *must have qualifying reason  May offer assistance with utilities (gas/electric) payments   *must have disconnection notice    Hours:  Friday 9:00am - 4:00pm by appointment only   * call during the week for appointment      [address]  [address] BRYN, [address]  [email or web address]  [phone]     Services:  Provides emergency food once a month to those in need. Requires a picture I.D. & a Social Security number. Hours: Wednesday 9:00 a.m. - 10:45 a.m. (Food Pantry)  Friday 12:00 pm - 1:00 pm ([place] Table)        [facility]  [address] [facility], [address] [facility]  [email or web address]  [phone]    Services:  Provides emergency food once a month to those people in need. Requires picture ID and social security number. Hours:  Monday 11:00am - 1:00pm  Wednesday 9:30am - 11:30am         [address] [place] [address]  [phone]  Telephone: [phone]    Services:  Emergency shelter  Three meals a day  Clothing  Case management with referrals to other professional agencies  Tutoring  Wheelchair accessible  Length of stay is overnight to 40 days. Each case is evaluated on an individual basis. Food Pantry  A 3-day supply of food will be provided to those in need. Appointments are to be made for the food pantry. Please call [phone] to make an appointment. Hours: Monday - Friday from 10:00 AM - 12:00 PM  Soup Kitchen  Hours:  Lunch: Monday - Friday 11 AM - 12:30 PM  Dinner: Monday - Thursday 6 PM - 7 PM        [facility] of [address], 605 [facility]  [phone]    Services:   Food Pantry   Tues. and Thurs. 12:00pm - 2:30pm (by appointment only)   Must call Wednesday and Friday 10:30am - 12:30pm for appointment   Revisits are allowed every 28 days  Emergency Assistance   Call [phone] accepted between 10:00 - 10:30 AM Tuesdays and Fridays        [employer]  [address] [facility]  [phone]  [email or web address].[facility]. org    Meals on Wheels Home Delivery  Noontime and evening meals, hot, chilled or frozen, are available Monday through Friday for a donation or a small fee. Call [phone] for more information. Parminder Soriano is provided in a group setting within Ushahidi rooms at noontime for those ages 61 and better. Reservations are required 24 hours in advance. [employer]:     [address], [place], [place]    [phone]   Perlie Loomis:     [address], [place]    [phone]   Geoffry Socks:     [address], [place]    [phone]   [facility]:     [place], [place]    [phone]   OBERHÖRNBACH:     [address] [place] [address]    [phone]   JPWUTKIXZE XGCEF:     [address] [place], [place]    [phone]   [facility]:     [address] [place], [place]    [phone]     *Meals served at all locations, Monday through Friday. For additional information and referrals, dial 2-1-1. Residents can obtain information at 2-1-1 about hospitals, doctors, the nearest food pantry, utility services, or a variety of other types of resources. The number is available for non-emergency assistance 24 hours a day. Alternate Numbers:    [facility]:  [phone]      [facility]:  [phone]      [facility]:  [phone]       Toll-Free:  [phone]     Resources for [address] (emergency housing for families & single women)  [address], [address]  Phone: [phone]  Fax: [phone] (emergency housing for single men)  [address], [address] ? Phone: [phone] ? Fax: [phone]    Irwin Kelley (permanent supportive housing)  [address], [address] ? Phone: [phone] ? Fax: [phone]    *IHN Intake Office Hours are Monday through Friday 9:00am-3:30pm.        [facility] of [address] Assistance  Location: [address] [place] [address], [address]  Hours: Tuesday-Friday 10:30 AM - 12:00 PM  Assistance with lodging, prescriptions, IDs, and work clothing    Emergency Assistance  Phone: [phone]  Calls accepted between 10:00 - 10:30 AM Tuesdays and Fridays. Assistance with rent, utilities and furniture. [facility] Emergency [address]  2050 [facility], [address]  [phone]    Hours:  Mon. 2:30pm - 5:00pm     Services:  Provides emergency food and limited assistance with financial needs including rent,  utilities, and prescriptions  [address] [place] [address]  [address]  [email or web address] salvationarmyohio. org  0489 49 39 46:  Provides food assistance to [facility] residents in need  May also provide hygiene items  May offer assistance with emergency rent   *must have qualifying reason  May offer assistance with utilities (gas/electric) payments   *must have disconnection notice    Hours:  Friday 9:00am - 4:00pm by appointment only   * call during the week for appointment        [facility] [place] (ASEM)  [address] [facility]  [phone]  [facility] CenterAddRUST.. org    Provides neighbors in [facility] with decent, safe, sanitary, affordable housing. Strives to Black & Teixeira independence, pride in community, self-sufficiency, self-worth, upward mobility, and participation in the economic and [address] [place] [address]. * For applications or to apply to the housing waiting list, visit the [facility] RENSSLAER office at [facility]. [address] [facility], [address].  Public housing applications may be received from 9 a.m. to 11:59 a.m., and from 1 to 4 p.m. Monday through Friday. (The office is closed from noon to 1 p.m. weekdays.) [facility] does NOT have emergency housing; however, it does give preference to veterans, the homeless/displaced, 40% rent-burdened applicants, those actively in the workforce, and those aged 58 and older. [address] [place] [address] Mamadou AlvaresNorthern Navajo Medical Centeralex 61 [phone]  Telephone: [phone]    Services:  Emergency shelter  Three meals a day  Clothing  Case management with referrals to other professional agencies  Tutoring  Wheelchair accessible  Length of stay is overnight to 40 days. Each case is evaluated on an individual basis. Food Pantry  A 3-day supply of food will be provided to those in need. Appointments are to be made for the food pantry. Please call [phone] to make an appointment. Hours: Monday - Friday from 10:00 AM - 12:00 PM  Soup Kitchen  Hours:  Lunch: Monday - Friday 11 AM - 12:30 PM  Dinner: Monday - Thursday 6 PM - 7 PM        [employer]  [facility]    Emmet Dayton, [address]  [phone]   www. projectwomanohio. org    24-Hour Crisis Hotline: 4-787.960.3704        [facility] of [address] [place] [address]. [place], [place], [address] [place] [email or web address]    Services:  Emergency housing 24 hours a day seven days a week, 365 day a year. Hours (office):  Monday through Friday 8AM to 93 Ryanne Cabrera   (Adult Emergency Homeless Shelter)  Miguel Suh.  [place], [address]  [phone]  [email or web address]. [address] Location  [address] [place] [address], [address] [place] [address] Street  http://[email or web address]  Crisis Hotline:   68 462 580  Administrative Offices:   836.437.5516: Offers emergency shelter, open 24/7, for youth ages 8 to 21 years        For additional information and referrals, dial 2-1-1.   Residents can obtain information at 2-1-1 about hospitals, doctors, the nearest food pantry, utility services, or a variety of other types of resources. The number is available for non-emergency assistance 24 hours a day.   Alternate Numbers:   [facility]:  [phone]   [facility]:  [phone]   [facility]:  [phone]   Toll-Free:  [phone]

## 2023-02-09 NOTE — ED PROVIDER NOTES
Triage Chief Complaint:    Chest Pain    HPI   Teetee Lindsey is a 27 y.o. male that presents for evaluation of chest discomfort that is worse with palpation. Patient has stated he had the symptoms for a long time now. Happen intermittently. He wanted to be evaluated here for this. He states that he is also here because he is homeless. He would like to stay here for several more hours if he is able to. This was unprompted. Discussed with him that I would like to proceed with the interview first.  He was amenable. The patient has that he is not having any numbness tingling or weakness. No abdominal pain nausea or vomiting. No leg swelling or calf pain. Denies any productive cough or congestion. Denies illicit drug use. No history of CAD. History from : Patient    Limitations to history : None    ROS:  10 systems reviewed and negative except as above.      Past Medical History:   Diagnosis Date    Bipolar 1 disorder (HCC)     Chronic generalized abdominal pain     Depression     GERD (gastroesophageal reflux disease)     IBS (irritable bowel syndrome)     Mental impairment     OCD (obsessive compulsive disorder)     Schizo affective schizophrenia ([facility] [address]      Past Surgical History:   Procedure Laterality Date    COLONOSCOPY      does not know    EGD      does not know     Family History   Problem Relation Age of Onset    Colon Cancer Neg Hx     Esophageal Cancer Neg Hx     Liver Cancer Neg Hx     Rectal Cancer Neg Hx     Stomach Cancer Neg Hx      Social History     Socioeconomic History    Marital status: Unknown     Spouse name: Not on file    Number of children: Not on file    Years of education: Not on file    Highest education level: Not on file   Occupational History    Not on file   Tobacco Use    Smoking status: Never    Smokeless tobacco: Never   Substance and Sexual Activity    Alcohol use: Never    Drug use: Never    Sexual activity: Not on file   Other Topics Concern    Not on file   Social History Narrative    Not on file     Social Determinants of Health     Financial Resource Strain: Not on file   Food Insecurity: Not on file   Transportation Needs: Not on file   Physical Activity: Not on file   Stress: Not on file   Social Connections: Not on file   Intimate Partner Violence: Not on file   Housing Stability: Not on file     No current facility-administered medications for this encounter. Current Outpatient Medications   Medication Sig Dispense Refill    famotidine (PEPCID) 20 MG tablet Take 1 tablet by mouth 2 times daily for 14 days 28 tablet 0    ondansetron (ZOFRAN) 4 MG tablet Take 1 tablet by mouth every 8 hours as needed for Nausea 10 tablet 0    dicyclomine (BENTYL) 10 MG capsule Take 1 capsule by mouth 4 times daily for 10 days 40 capsule 0    aspirin 81 MG EC tablet Take 81 mg by mouth daily      Fesoterodine Fumarate ER 8 MG TB24 Take 8 mg by mouth every evening       Allergies   Allergen Reactions    Latex     Adhesive Tape     Codeine     Iodine     Shellfish-Derived Products        Nursing Notes Reviewed    Physical Exam:     ED Triage Vitals [02/09/23 0936]   Enc Vitals Group      /64      Heart Rate 76      Resp 20      Temp 98 °F (36.7 °C)      Temp Source Oral      SpO2 100 %      Weight       Height       Head Circumference       Peak Flow       Pain Score       Pain Loc       Pain Edu? Excl. in [address]? My pulse ox interpretation is - normal    General appearance:  No acute distress. Skin:  Warm. Dry. Eye:  Extraocular movements intact. Ears, nose, mouth and throat:  Oral mucosa moist   Neck:  Trachea midline. Extremity:  No swelling. Normal ROM     Heart:  Regular rate and rhythm. Perfusion:  intact  Respiratory:  Lungs clear to auscultation bilaterally. Respirations nonlabored. Abdominal:  Normal bowel sounds. Soft. Nontender. Non distended. Back:  No CVA tenderness to palpation     Neurological:  Alert and oriented times 3.   Motor and sensory grossly intact, coordination intact          Psychiatric:  Appropriate      I have reviewed and interpreted all of the currently available lab results from this visit (if applicable):  Results for orders placed or performed during the hospital encounter of 02/09/23   CBC   Result Value Ref Range    WBC 7.9 4.0 - 10.5 K/CU MM    RBC 4.58 (L) 4.6 - 6.2 M/CU MM    Hemoglobin 14.3 13.5 - 18.0 GM/DL    Hematocrit 44.0 42 - 52 %    MCV 96.1 78 - 100 FL    MCH 31.2 (H) 27 - 31 PG    MCHC 32.5 32.0 - 36.0 %    RDW 12.0 11.7 - 14.9 %    Platelets 533 076 - 485 K/CU MM    MPV 9.7 7.5 - 11.1 FL   Comprehensive Metabolic Panel   Result Value Ref Range    Sodium 138 135 - 145 MMOL/L    Potassium 4.2 3.5 - 5.1 MMOL/L    Chloride 101 99 - 110 mMol/L    CO2 30 21 - 32 MMOL/L    BUN 13 6 - 23 MG/DL    Creatinine 0.8 (L) 0.9 - 1.3 MG/DL    Est, Glom Filt Rate >60 >60 mL/min/1.73m2    Glucose 95 70 - 99 MG/DL    Calcium 9.3 8.3 - 10.6 MG/DL    Albumin 4.8 3.4 - 5.0 GM/DL    Total Protein 7.6 6.4 - 8.2 GM/DL    Total Bilirubin 0.3 0.0 - 1.0 MG/DL    ALT 16 10 - 40 U/L    AST 22 15 - 37 IU/L    Alkaline Phosphatase 83 40 - 128 IU/L    Anion Gap 7 4 - 16   Troponin   Result Value Ref Range    Troponin T <0.010 <0.01 NG/ML   EKG 12 Lead   Result Value Ref Range    Ventricular Rate 83 BPM    Atrial Rate 83 BPM    P-R Interval 120 ms    QRS Duration 94 ms    Q-T Interval 364 ms    QTc Calculation (Bazett) 427 ms    P Axis 57 degrees    R Axis 49 degrees    T Axis 51 degrees    Diagnosis       Normal sinus rhythm with sinus arrhythmia  Voltage criteria for left ventricular hypertrophy  Abnormal ECG  When compared with ECG of 29-DEC-2022 16:39,  No significant change was found  Confirmed by Christin Caban ([id number]) on 2/9/2023 1:29:22 PM        Radiographs (if obtained):  [] The following radiograph was interpreted by myself in the absence of a radiologist:   [] Radiologist's Report Reviewed:  XR CHEST (2 VW)   Final Result   No acute cardiopulmonary process. Procedures:  12 lead EKG per my interpretation:  12 lead EKG per my interpretation:  Normal Sinus Rhythm  Rate: 83  QTc is  normal  There are no T wave changes appreciated. There are no ST wave changes appreciated. Prior EKG to compare with was available and is similar    (All EKG's are interpreted by myself in the absence of a cardiologist)      Chart review shows recent radiographs:  XR CHEST (2 VW)    Result Date: 2/9/2023  EXAMINATION: TWO XRAY VIEWS OF THE CHEST 2/9/2023 10:49 am COMPARISON: 12/24/2022. HISTORY: ORDERING SYSTEM PROVIDED HISTORY: Chest Pain TECHNOLOGIST PROVIDED HISTORY: Reason for exam:->Chest Pain Reason for Exam: Chest Pain Additional signs and symptoms: Chest Pain Relevant Medical/Surgical History: Chest Pain FINDINGS: Cardiomediastinal silhouette appears unremarkable. No parenchymal opacities. No effusion or pneumothorax. No aggressive osseous lesion. No acute cardiopulmonary process. MDM:     Discussion with Other Professionals : None    Social Determinants: Homeless    Records Reviewed : Outpatient Notes multiple evaluations in the ER for similar complaints. Chronic conditions affecting care:  none    Labs ordered and results as above (interpreted by myself), CBC showed no significant concerning anemia, chemistry showed no significant concerning electrolyte derangements or acute renal failure, and troponin was either normal or close to baseline so lower suspicion for acute MI, myocarditis, pericarditis  Imaging interpreted and reviewed by myself: CXR showed no evidence of pneumonia, new effusion, or pneumothorax. EKG interpreted by myself as above, not acutely concerning    Patient was given the following medications:  Medications - No data to display    Disposition Discussion:  Patient will be discharged home with expectations to follow closely with primary care doctor as well as return if any change or worsening.   Heart score of 1.  Given instructions to follow-up closely with primary care doctor provided. All questions and concerns answered at this time. Low risk by Wells criteria and PERC negative. I do not feel as though he requires further imaging or D-dimer. Disposition: Discharged     I am the primary physician of record    Clinical Impression:  1. Chest pain, unspecified type    2. Homelessness      Disposition referral (if applicable):  Janes Rios  [address] [place] [address]  [phone]    Schedule an appointment as soon as possible for a visit       [facility] Emergency Department  [facility] [phone]    If symptoms worsen  Disposition medications (if applicable):  Discharge Medication List as of 2/9/2023 12:14 PM          Comment: Please note this report has been produced using speech recognition software and may contain errors related to that system including errors in grammar, punctuation, and spelling, as well as words and phrases that may be inappropriate. If there are any questions or concerns please feel free to contact the dictating provider for clarification.        Tiesha Cheema MD  02/09/23 8771

## 2023-02-12 ENCOUNTER — HOSPITAL ENCOUNTER (EMERGENCY)
Age: 33
Discharge: HOME | End: 2023-02-12
Payer: COMMERCIAL

## 2023-02-12 ENCOUNTER — APPOINTMENT (OUTPATIENT)
Dept: GENERAL RADIOLOGY | Age: 33
End: 2023-02-12
Payer: COMMERCIAL

## 2023-02-12 VITALS
SYSTOLIC BLOOD PRESSURE: 112 MMHG | OXYGEN SATURATION: 98 % | HEART RATE: 87 BPM | RESPIRATION RATE: 16 BRPM | BODY MASS INDEX: 23.21 KG/M2 | TEMPERATURE: 98.3 F | DIASTOLIC BLOOD PRESSURE: 78 MMHG | HEIGHT: 63 IN | WEIGHT: 131 LBS

## 2023-02-12 DIAGNOSIS — R07.89 CHEST WALL PAIN: Primary | ICD-10-CM

## 2023-02-12 LAB
EKG ATRIAL RATE: 87 BPM
EKG DIAGNOSIS: NORMAL
EKG P AXIS: 39 DEGREES
EKG P-R INTERVAL: 130 MS
EKG Q-T INTERVAL: 352 MS
EKG QRS DURATION: 90 MS
EKG QTC CALCULATION (BAZETT): 423 MS
EKG R AXIS: 30 DEGREES
EKG T AXIS: 30 DEGREES
EKG VENTRICULAR RATE: 87 BPM

## 2023-02-12 PROCEDURE — 93005 ELECTROCARDIOGRAM TRACING: CPT | Performed by: PHYSICIAN ASSISTANT

## 2023-02-12 PROCEDURE — 93010 ELECTROCARDIOGRAM REPORT: CPT | Performed by: INTERNAL MEDICINE

## 2023-02-12 PROCEDURE — 6360000002 HC RX W HCPCS: Performed by: PHYSICIAN ASSISTANT

## 2023-02-12 PROCEDURE — 96374 THER/PROPH/DIAG INJ IV PUSH: CPT

## 2023-02-12 PROCEDURE — 6370000000 HC RX 637 (ALT 250 FOR IP): Performed by: PHYSICIAN ASSISTANT

## 2023-02-12 PROCEDURE — 71045 X-RAY EXAM CHEST 1 VIEW: CPT

## 2023-02-12 PROCEDURE — 99284 EMERGENCY DEPT VISIT MOD MDM: CPT

## 2023-02-12 RX ORDER — KETOROLAC TROMETHAMINE 30 MG/ML
15 INJECTION, SOLUTION INTRAMUSCULAR; INTRAVENOUS ONCE
Status: COMPLETED | OUTPATIENT
Start: 2023-02-12 | End: 2023-02-12

## 2023-02-12 RX ORDER — ASPIRIN 81 MG/1
324 TABLET, CHEWABLE ORAL ONCE
Status: COMPLETED | OUTPATIENT
Start: 2023-02-12 | End: 2023-02-12

## 2023-02-12 RX ORDER — DICYCLOMINE HCL 20 MG
20 TABLET ORAL ONCE
Status: DISCONTINUED | OUTPATIENT
Start: 2023-02-12 | End: 2023-02-12 | Stop reason: HOSPADM

## 2023-02-12 RX ADMIN — KETOROLAC TROMETHAMINE 15 MG: 30 INJECTION, SOLUTION INTRAMUSCULAR; INTRAVENOUS at 03:17

## 2023-02-12 RX ADMIN — ASPIRIN 324 MG: 81 TABLET, CHEWABLE ORAL at 01:17

## 2023-02-12 ASSESSMENT — PAIN SCALES - GENERAL
PAINLEVEL_OUTOF10: 10

## 2023-02-12 ASSESSMENT — PAIN DESCRIPTION - LOCATION: LOCATION: CHEST

## 2023-02-12 NOTE — ED PROVIDER NOTES
EKG:  Normal sinus rhythm, normal EKG  Ventricular rate of 87  No STEMI. No Brugada.   Similar to EKG of 2/9/2023  EKG interpreted by me pending cardiologist reports       Mariely Keller DO  02/12/23 3840

## 2023-02-12 NOTE — ED PROVIDER NOTES
Triage Chief Complaint:   Chest Pain    [place]:  Today in the ED I had the pleasure of caring for Freddie Zendejas who is a 27 y.o. male that presents today to the ED complaining of chest pain. Ongoing x several months however worseining x ~3 days, pressure in quality. Raddiating in all directions. Some associated nausea, no sob, no leg swelling. Sx are exacerbated by stress.      ROS:  REVIEW OF SYSTEMS    At least 10 systems reviewed      All other review of systems are negative  See HPI and nursing notes for additional information       Past Medical History:   Diagnosis Date    Bipolar 1 disorder ([facility] [address]     Chronic generalized abdominal pain     Depression     GERD (gastroesophageal reflux disease)     IBS (irritable bowel syndrome)     Mental impairment     OCD (obsessive compulsive disorder)     Sch[facility]      Past Surgical History:   Procedure Laterality Date    COLONOSCOPY      does not know    EGD      does not know     Family History   Problem Relation Age of Onset    Colon Cancer Neg Hx     Esophageal Cancer Neg Hx     Liver Cancer Neg Hx     Rectal Cancer Neg Hx     Stomach Cancer Neg Hx      Social History     Socioeconomic History    Marital status: Unknown     Spouse name: Not on file    Number of children: Not on file    Years of education: Not on file    Highest education level: Not on file   Occupational History    Not on file   Tobacco Use    Smoking status: Never    Smokeless tobacco: Never   Substance and Sexual Activity    Alcohol use: Never    Drug use: Never    Sexual activity: Not on file   Other Topics Concern    Not on file   Social History Narrative    Not on file     Social Determinants of Health     Financial Resource Strain: Not on file   Food Insecurity: Not on file   Transportation Needs: Not on file   Physical Activity: Not on file   Stress: Not on file   Social Connections: Not on file   Intimate Partner Violence: Not on file   Housing Stability: Not on file Current Facility-Administered Medications   Medication Dose Route Frequency Provider Last Rate Last Admin    ketorolac (TORADOL) injection 15 mg  15 mg IntraVENous Once Freddy Prater PA-C         Current Outpatient Medications   Medication Sig Dispense Refill    famotidine (PEPCID) 20 MG tablet Take 1 tablet by mouth 2 times daily for 14 days 28 tablet 0    ondansetron (ZOFRAN) 4 MG tablet Take 1 tablet by mouth every 8 hours as needed for Nausea 10 tablet 0    dicyclomine (BENTYL) 10 MG capsule Take 1 capsule by mouth 4 times daily for 10 days 40 capsule 0    aspirin 81 MG EC tablet Take 81 mg by mouth daily      Fesoterodine Fumarate ER 8 MG TB24 Take 8 mg by mouth every evening       Allergies   Allergen Reactions    Latex     Adhesive Tape     Codeine     Iodine     Shellfish-Derived Products        Nursing Notes Reviewed    Physical Exam:  ED Triage Vitals [02/12/23 0015]   Enc Vitals Group      /80      Heart Rate 94      Resp 18      Temp 98.3 °F (36.8 °C)      Temp Source Oral      SpO2 100 %      Weight       Height       Head Circumference       Peak Flow       Pain Score       Pain Loc       Pain Edu? Excl. in [address]? General :Patient is awake alert oriented person place and time no acute distress nontoxic appearing  HEENT: Pupils are equally round and reactive to light extraocular motors are intact conjunctivae clear sclerae white there is no injection no icterus. Nose without any rhinorrhea or epistaxis. Oral mucosa is moist no exudate buccal mucosa shows no ulcerations. Uvula is midline    Neck: Neck is supple full range of motion trachea midline thyroid nonpalpable  Cardiac: Heart regular rate rhythm no murmurs rubs clicks or gallops  Lungs: Lungs are clear to auscultation there is no wheezing rhonchi or rales. There is no use of accessory muscles no nasal flaring identified. Chest wall:  There is tenderness to palpation over the chest wall  Abdomen: Abdomen is soft nontender nondistended. There is no firm or pulsatile masses no rebound rigidity or guarding negative Leonard's negative McBurney, no peritoneal signs  Suprapubic:  there is no tenderness to palpation over the external bladder   Musculoskeletal: 5 out of 5 strength in all 4 extremities full flexion extension abduction and adduction supination pronation of all extremities and all digits. No obvious muscle atrophy is noted. No focal muscle deficits are appreciated  Dermatology: Skin is warm and dry there is no obvious abscesses lacerations or lesions noted  Psych: Mentation is grossly normal cognition is grossly normal. Affect is appropriate  Neuro: Motor intact sensory intact cranial nerves II through XII are intact level of consciousness is normal cerebellar function is normal reflexes are grossly normal. No evidence of incontinence or loss of bowel or bladder no saddle anesthesia noted Lymphatic: There is no submandibular or cervical adenopathy appreciated. I have reviewed and interpreted all of the currently available lab results from this visit (if applicable):  Results for orders placed or performed during the hospital encounter of 02/12/23   EKG 12 Lead   Result Value Ref Range    Ventricular Rate 87 BPM    Atrial Rate 87 BPM    P-R Interval 130 ms    QRS Duration 90 ms    Q-T Interval 352 ms    QTc Calculation (Bazett) 423 ms    P Axis 39 degrees    R Axis 30 degrees    T Axis 30 degrees    Diagnosis       Normal sinus rhythm  Normal ECG  When compared with ECG of 09-FEB-2023 09:40,  No significant change was found        Radiographs (if obtained):  [] The following radiograph was interpreted by myself in the absence of a radiologist:   [] Radiologist's Report Reviewed:  XR CHEST PORTABLE   Final Result   No acute abnormality. EKG (if obtained):   Please See Note of attending physician for EKG interpretation.      Chart review shows recent radiograph(s):  XR CHEST (2 VW)    Result Date: 2/9/2023  EXAMINATION: TWO XRAY VIEWS OF THE CHEST 2/9/2023 10:49 am COMPARISON: 12/24/2022. HISTORY: ORDERING SYSTEM PROVIDED HISTORY: Chest Pain TECHNOLOGIST PROVIDED HISTORY: Reason for exam:->Chest Pain Reason for Exam: Chest Pain Additional signs and symptoms: Chest Pain Relevant Medical/Surgical History: Chest Pain FINDINGS: Cardiomediastinal silhouette appears unremarkable. No parenchymal opacities. No effusion or pneumothorax. No aggressive osseous lesion. No acute cardiopulmonary process. MDM:     Problems Addressed:  1. Chest wall pain        Interventions given this visit:   Orders Placed This Encounter   Medications    aspirin chewable tablet 324 mg    ketorolac (TORADOL) injection 15 mg      Interventions listed are used to treat Problem list above    Images independently interpreted by me:    Chest x-ray: No acute cardiopulmonary processes. Patient does present today to the emergency department here with chest wall pain. Reproducible on examination. 0 cardiac risk factors. Negative chest x-ray and EKG. Patient symptoms are reproducible on examination likely musculoskeletal in etiology. Toradol was provided. Will provide cardiac follow-up however    According to [facility] criteria, patient is considered low risk  For Pulmonary Embolism. Patient is not hypoxic with --SaO2 >94%, No unilateral leg swelling, No history of hemoptysis, No prior history of  DVT or PE, No recent surgery or trauma, age <50,No hormone use,No tachycardia. Hence no further diagnostic testing for PE has been performed. Due to patients negative PERC, further testing such as CT angiogram of the chest may cause more harm, from radiation exposure and contrast dye than the risk of PE. I independently managed patient today in the ED. /80   Pulse 94   Temp 98.3 °F (36.8 °C) (Oral)   Resp 18   Ht 5' 3\" (1.6 m)   Wt 131 lb (59.4 kg)   SpO2 100%   BMI 23.21 kg/m²       Clinical Impression:  1.  Chest wall pain        Disposition referral (if applicable):  [facility] Emergency Department  [address]  [phone]    If symptoms worsen or persist  Disposition medications (if applicable):  New Prescriptions    No medications on file         Comment: Please note this report has been produced using speech recognition software and may contain errors related to that system including errors in grammar, punctuation, and spelling, as well as words and phrases that may be inappropriate. If there are any questions or concerns please feel free to contact the dictating provider for clarification. Please note Images are personally interpreted by this Provider (PA [place]. )However final disposition is made with deference to Radiologist interpretation of said images.        Caitlyn Tolentino, [address] [place] [address]  02/12/23 0859

## 2023-02-12 NOTE — CARE COORDINATION
CM was informed that patient needing a ride home upon discharge. Patient needing to go to [address] [place] [address] to his fathers' residence. CM called [employer] @ [phone]. [employer] to transport. Invoice completed. 0400: [employer] called this CM and stated that patient did not give this CM a complete address and [employer] is not sure where to go. CM asked to speak to patient. CM asked the patient if he knew the entire address. Patient stated no and then started rambling on different directions \"of where it might be. \" CM explained that patient would need to step back into the ED and make phone calls and call someone to get entire address before leaving town with [employer]. Patient then hung up the phone on this CM. CM called Melissa Krueger back. Emani Torres reported that patient changed his destination and requested that Emani Torres just drop him off at the [facility] Emergency Room.

## 2023-02-12 NOTE — ED NOTES
Patient arrives via EMS for complaints of chest pain. Patient received 325 [address]SA in route. Patient is alert and oriented.       Ayleen Malloy RN  02/12/23 0031

## 2023-03-12 ENCOUNTER — HOSPITAL ENCOUNTER (EMERGENCY)
Age: 33
Discharge: HOME | End: 2023-03-13
Attending: EMERGENCY MEDICINE
Payer: COMMERCIAL

## 2023-03-12 ENCOUNTER — APPOINTMENT (OUTPATIENT)
Dept: CT IMAGING | Age: 33
End: 2023-03-12
Payer: COMMERCIAL

## 2023-03-12 DIAGNOSIS — K62.89 PROCTITIS: Primary | ICD-10-CM

## 2023-03-12 DIAGNOSIS — G89.29 CHRONIC ABDOMINAL PAIN: ICD-10-CM

## 2023-03-12 DIAGNOSIS — R10.9 CHRONIC ABDOMINAL PAIN: ICD-10-CM

## 2023-03-12 LAB
ANION GAP SERPL CALCULATED.3IONS-SCNC: 12 MMOL/L (ref 3–16)
BASOPHILS ABSOLUTE: 0 K/UL (ref 0–0.2)
BASOPHILS RELATIVE PERCENT: 0.4 %
BILIRUBIN URINE: NEGATIVE
BLOOD, URINE: NEGATIVE
BUN BLDV-MCNC: 17 MG/DL (ref 7–20)
CALCIUM SERPL-MCNC: 8.8 MG/DL (ref 8.3–10.6)
CHLORIDE BLD-SCNC: 102 MMOL/L (ref 99–110)
CLARITY: CLEAR
CO2: 27 MMOL/L (ref 21–32)
COLOR: YELLOW
CREAT SERPL-MCNC: 0.8 MG/DL (ref 0.9–1.3)
EOSINOPHILS ABSOLUTE: 0.2 K/UL (ref 0–0.6)
EOSINOPHILS RELATIVE PERCENT: 3 %
GFR SERPL CREATININE-BSD FRML MDRD: >60 ML/MIN/{1.73_M2}
GLUCOSE BLD-MCNC: 94 MG/DL (ref 70–99)
GLUCOSE URINE: NEGATIVE MG/DL
HCT VFR BLD CALC: 39.3 % (ref 40.5–52.5)
HEMOGLOBIN: 12.8 G/DL (ref 13.5–17.5)
KETONES, URINE: NEGATIVE MG/DL
LEUKOCYTE ESTERASE, URINE: NEGATIVE
LYMPHOCYTES ABSOLUTE: 1.8 K/UL (ref 1–5.1)
LYMPHOCYTES RELATIVE PERCENT: 28 %
MCH RBC QN AUTO: 30.9 PG (ref 26–34)
MCHC RBC AUTO-ENTMCNC: 32.6 G/DL (ref 31–36)
MCV RBC AUTO: 94.7 FL (ref 80–100)
MICROSCOPIC EXAMINATION: NORMAL
MONOCYTES ABSOLUTE: 0.6 K/UL (ref 0–1.3)
MONOCYTES RELATIVE PERCENT: 8.7 %
NEUTROPHILS ABSOLUTE: 3.9 K/UL (ref 1.7–7.7)
NEUTROPHILS RELATIVE PERCENT: 59.9 %
NITRITE, URINE: NEGATIVE
PDW BLD-RTO: 13.4 % (ref 12.4–15.4)
PH UA: 6.5 (ref 5–8)
PLATELET # BLD: 219 K/UL (ref 135–450)
PMV BLD AUTO: 8.2 FL (ref 5–10.5)
POTASSIUM REFLEX MAGNESIUM: 4 MMOL/L (ref 3.5–5.1)
PROTEIN UA: NEGATIVE MG/DL
RBC # BLD: 4.15 M/UL (ref 4.2–5.9)
SODIUM BLD-SCNC: 141 MMOL/L (ref 136–145)
SPECIFIC GRAVITY UA: 1.03 (ref 1–1.03)
URINE REFLEX TO CULTURE: NORMAL
URINE TYPE: NORMAL
UROBILINOGEN, URINE: 1 E.U./DL
WBC # BLD: 6.5 K/UL (ref 4–11)

## 2023-03-12 PROCEDURE — 74176 CT ABD & PELVIS W/O CONTRAST: CPT

## 2023-03-12 PROCEDURE — 81003 URINALYSIS AUTO W/O SCOPE: CPT

## 2023-03-12 PROCEDURE — 6370000000 HC RX 637 (ALT 250 FOR IP): Performed by: PHYSICIAN ASSISTANT

## 2023-03-12 PROCEDURE — 36415 COLL VENOUS BLD VENIPUNCTURE: CPT

## 2023-03-12 PROCEDURE — 99284 EMERGENCY DEPT VISIT MOD MDM: CPT

## 2023-03-12 PROCEDURE — 80048 BASIC METABOLIC PNL TOTAL CA: CPT

## 2023-03-12 PROCEDURE — 85025 COMPLETE CBC W/AUTO DIFF WBC: CPT

## 2023-03-12 RX ORDER — AMOXICILLIN AND CLAVULANATE POTASSIUM 875; 125 MG/1; MG/1
1 TABLET, FILM COATED ORAL 2 TIMES DAILY
Qty: 20 TABLET | Refills: 0 | Status: SHIPPED | OUTPATIENT
Start: 2023-03-12 | End: 2023-03-13 | Stop reason: SDUPTHER

## 2023-03-12 RX ORDER — AMOXICILLIN AND CLAVULANATE POTASSIUM 875; 125 MG/1; MG/1
1 TABLET, FILM COATED ORAL ONCE
Status: COMPLETED | OUTPATIENT
Start: 2023-03-12 | End: 2023-03-13

## 2023-03-12 RX ORDER — ACETAMINOPHEN 325 MG/1
650 TABLET ORAL ONCE
Status: COMPLETED | OUTPATIENT
Start: 2023-03-12 | End: 2023-03-12

## 2023-03-12 RX ADMIN — ACETAMINOPHEN 650 MG: 325 TABLET ORAL at 21:42

## 2023-03-12 ASSESSMENT — PAIN SCALES - GENERAL
PAINLEVEL_OUTOF10: 10
PAINLEVEL_OUTOF10: 10

## 2023-03-12 ASSESSMENT — PAIN - FUNCTIONAL ASSESSMENT: PAIN_FUNCTIONAL_ASSESSMENT: 0-10

## 2023-03-12 ASSESSMENT — PAIN DESCRIPTION - LOCATION: LOCATION: ABDOMEN;RECTUM

## 2023-03-13 VITALS
OXYGEN SATURATION: 98 % | DIASTOLIC BLOOD PRESSURE: 87 MMHG | BODY MASS INDEX: 22.82 KG/M2 | HEART RATE: 93 BPM | HEIGHT: 63 IN | RESPIRATION RATE: 18 BRPM | TEMPERATURE: 97 F | WEIGHT: 128.8 LBS | SYSTOLIC BLOOD PRESSURE: 125 MMHG

## 2023-03-13 VITALS
BODY MASS INDEX: 22.66 KG/M2 | WEIGHT: 127.87 LBS | DIASTOLIC BLOOD PRESSURE: 72 MMHG | OXYGEN SATURATION: 97 % | HEIGHT: 63 IN | RESPIRATION RATE: 18 BRPM | TEMPERATURE: 97.1 F | HEART RATE: 63 BPM | SYSTOLIC BLOOD PRESSURE: 111 MMHG

## 2023-03-13 DIAGNOSIS — K62.89 PROCTITIS: Primary | ICD-10-CM

## 2023-03-13 DIAGNOSIS — Z59.00 HOMELESSNESS: ICD-10-CM

## 2023-03-13 DIAGNOSIS — Z91.199 MEDICALLY NONCOMPLIANT: ICD-10-CM

## 2023-03-13 DIAGNOSIS — R10.9 CHRONIC ABDOMINAL PAIN: ICD-10-CM

## 2023-03-13 DIAGNOSIS — G89.29 CHRONIC ABDOMINAL PAIN: ICD-10-CM

## 2023-03-13 LAB
BASE EXCESS VENOUS: 2 MMOL/L
CARBOXYHEMOGLOBIN: 1.2 %
HCO3 VENOUS: 29 MMOL/L (ref 23–29)
LACTIC ACID: 0.9 MMOL/L (ref 0.4–2)
METHEMOGLOBIN VENOUS: 0.1 %
O2 SAT, VEN: 94 %
O2 THERAPY: ABNORMAL
PCO2, VEN: 54.7 MMHG (ref 40–50)
PH VENOUS: 7.33 (ref 7.35–7.45)
PO2, VEN: 69 MMHG
TCO2 CALC VENOUS: 31 MMOL/L

## 2023-03-13 PROCEDURE — 83605 ASSAY OF LACTIC ACID: CPT

## 2023-03-13 PROCEDURE — 96372 THER/PROPH/DIAG INJ SC/IM: CPT

## 2023-03-13 PROCEDURE — 6370000000 HC RX 637 (ALT 250 FOR IP): Performed by: PHYSICIAN ASSISTANT

## 2023-03-13 PROCEDURE — 82803 BLOOD GASES ANY COMBINATION: CPT

## 2023-03-13 PROCEDURE — 6360000002 HC RX W HCPCS: Performed by: EMERGENCY MEDICINE

## 2023-03-13 PROCEDURE — 99284 EMERGENCY DEPT VISIT MOD MDM: CPT

## 2023-03-13 RX ORDER — AMOXICILLIN AND CLAVULANATE POTASSIUM 875; 125 MG/1; MG/1
1 TABLET, FILM COATED ORAL 2 TIMES DAILY
Qty: 20 TABLET | Refills: 0 | Status: SHIPPED | OUTPATIENT
Start: 2023-03-13 | End: 2023-03-23

## 2023-03-13 RX ORDER — DICYCLOMINE HYDROCHLORIDE 10 MG/ML
20 INJECTION INTRAMUSCULAR ONCE
Status: COMPLETED | OUTPATIENT
Start: 2023-03-13 | End: 2023-03-13

## 2023-03-13 RX ADMIN — AMOXICILLIN AND CLAVULANATE POTASSIUM 1 TABLET: 875; 125 TABLET, FILM COATED ORAL at 00:07

## 2023-03-13 RX ADMIN — DICYCLOMINE HYDROCHLORIDE 20 MG: 20 INJECTION INTRAMUSCULAR at 15:35

## 2023-03-13 SDOH — ECONOMIC STABILITY - HOUSING INSECURITY: HOMELESSNESS UNSPECIFIED: Z59.00

## 2023-03-13 ASSESSMENT — ENCOUNTER SYMPTOMS
VOMITING: 0
SHORTNESS OF BREATH: 0
SHORTNESS OF BREATH: 0
BLOOD IN STOOL: 0
DIARRHEA: 0
CONSTIPATION: 0
NAUSEA: 1
COUGH: 0
RECTAL PAIN: 1
ABDOMINAL PAIN: 1
DIARRHEA: 0
NAUSEA: 0
CHEST TIGHTNESS: 0
ABDOMINAL PAIN: 1
VOMITING: 1

## 2023-03-13 ASSESSMENT — LIFESTYLE VARIABLES
HOW MANY STANDARD DRINKS CONTAINING ALCOHOL DO YOU HAVE ON A TYPICAL DAY: 1 OR 2
HOW OFTEN DO YOU HAVE A DRINK CONTAINING ALCOHOL: MONTHLY OR LESS

## 2023-03-13 ASSESSMENT — PAIN - FUNCTIONAL ASSESSMENT: PAIN_FUNCTIONAL_ASSESSMENT: 0-10

## 2023-03-13 ASSESSMENT — PAIN DESCRIPTION - LOCATION: LOCATION: ABDOMEN

## 2023-03-13 NOTE — ED NOTES
Discharge and education instructions reviewed. Patient verbalized understanding, teach-back successful. Patient denied questions at this time. No acute distress noted. Patient instructed to follow-up as noted - return to emergency department if symptoms worsen. Patient verbalized understanding. Discharged per EDMD with discharge instructions.           Alvaro Lake RN  03/13/23 2859

## 2023-03-13 NOTE — ED PROVIDER NOTES
[address]        Pt Name: Kathy Castellanos  MRN: [id number]  [place] 1990  Date of evaluation: 3/13/2023  Provider: Sergey Huerta PA-C  PCP: No primary care provider on file. Note Started: 4:06 PM EDT 3/13/23       I have seen and evaluated this patient with my supervising physician No att. providers found. CHIEF COMPLAINT       Chief Complaint   Patient presents with    Abdominal Pain     Pt states lower abd pain, going to bottom and legs, been going on for months, been seen for same, pt states shots have helped in past, pt was with [facility] but not anymore       HISTORY OF PRESENT ILLNESS: 1 or more Elements     History from : Patient    Limitations to history : None    Kathy Castellanos is a 27 y.o. male with a history of OCD, mental impairment, bipolar 1 disorder, depression, schizoaffective schizophrenia, chronic abdominal pain and IBS who presents to the emergency department today complaining of abdominal pain, nausea and vomiting. He states he has had these symptoms chronically since he was 4years old. Patient has had 39 emergency department so far this year. He was seen at 2 different emergency departments yesterday and had a full laboratory work-up including a CT scan of his abdomen and pelvis. He was found to have proctitis and prescribed Augmentin. Patient states he has not yet got his prescription filled. Patient has had 8 CT scans of his abdomen and pelvis within the last year. Patient states he has chronic rectal prolapse. Nursing Notes were all reviewed and agreed with or any disagreements were addressed in the HPI. REVIEW OF SYSTEMS :      Review of Systems   Constitutional:  Negative for chills and fever. Respiratory:  Negative for chest tightness and shortness of breath. Cardiovascular:  Negative for chest pain.    Gastrointestinal:  Positive for abdominal pain, nausea, rectal pain and vomiting. Negative for diarrhea. Genitourinary:  Negative for difficulty urinating, dysuria, flank pain, frequency and hematuria. Neurological:  Negative for dizziness, light-headedness, numbness and headaches. All other systems reviewed and are negative. Positives and Pertinent negatives as per HPI.      SURGICAL HISTORY     Past Surgical History:   Procedure Laterality Date    COLONOSCOPY      does not know    EGD      does not know       [address]       Discharge Medication List as of 3/13/2023  3:36 PM        CONTINUE these medications which have NOT CHANGED    Details   famotidine (PEPCID) 20 MG tablet Take 1 tablet by mouth 2 times daily for 14 days, Disp-28 tablet, R-0Print      ondansetron (ZOFRAN) 4 MG tablet Take 1 tablet by mouth every 8 hours as needed for Nausea, Disp-10 tablet, R-0Print      dicyclomine (BENTYL) 10 MG capsule Take 1 capsule by mouth 4 times daily for 10 days, Disp-40 capsule, R-0Normal      aspirin 81 MG EC tablet Take 81 mg by mouth dailyHistorical Med      Fesoterodine Fumarate ER 8 MG TB24 Take 8 mg by mouth every eveningHistorical Med             ALLERGIES     Latex, Adhesive tape, Codeine, Iodine, and Shellfish-derived products    FAMILYHISTORY       Family History   Problem Relation Age of Onset    Colon Cancer Neg Hx     Esophageal Cancer Neg Hx     Liver Cancer Neg Hx     Rectal Cancer Neg Hx     Stomach Cancer Neg Hx         SOCIAL HISTORY       Social History     Tobacco Use    Smoking status: Never    Smokeless tobacco: Never   Substance Use Topics    Alcohol use: Never    Drug use: Never       SCREENINGS        [place] Coma Scale  Eye Opening: Spontaneous  Best Verbal Response: Oriented  Best Motor Response: Obeys commands  [place] Coma Scale Score: 15                CIWA Assessment  BP: 125/87  Heart Rate: 93           PHYSICAL EXAM  1 or more Elements     ED Triage Vitals   BP Temp Temp Source Heart Rate Resp SpO2 Height Weight   03/13/23 1402 03/13/23 66 65 76 03/13/23 1402 03/13/23 1402 03/13/23 1402 03/13/23 1402 03/13/23 1402 03/13/23 1402   125/87 97 °F (36.1 °C) Oral 93 18 98 % 5' 3\" (1.6 m) 128 lb 12.8 oz (58.4 kg)       Physical Exam  Vitals and nursing note reviewed. Constitutional:       Appearance: He is well-developed. He is not diaphoretic. HENT:      Head: Normocephalic and atraumatic. Eyes:      General:         Right eye: No discharge. Left eye: No discharge. Cardiovascular:      Rate and Rhythm: Normal rate and regular rhythm. Pulses: Normal pulses. Heart sounds: Normal heart sounds. Pulmonary:      Effort: Pulmonary effort is normal. No respiratory distress. Breath sounds: Normal breath sounds. Abdominal:      General: Abdomen is flat. Bowel sounds are normal. There is no distension. Palpations: Abdomen is soft. Tenderness: There is generalized abdominal tenderness. There is no guarding. Comments: Patient has mild generalized tenderness on palpation of the abdomen. There is no distention, rigidity or guarding. Abdomen is soft with bowel sounds present in all 4 quadrants. Musculoskeletal:         General: Normal range of motion. Cervical back: Normal range of motion and neck supple. Skin:     General: Skin is warm and dry. Coloration: Skin is not pale. Neurological:      Mental Status: He is alert and oriented to person, place, and time. Psychiatric:         Behavior: Behavior normal.           DIAGNOSTIC RESULTS   LABS:    Labs Reviewed - No data to display    When ordered only abnormal lab results are displayed. All other labs were within normal range or not returned as of this dictation. EKG: When ordered, EKG's are interpreted by the Emergency Department Physician in the absence of a cardiologist.  Please see their note for interpretation of EKG.     RADIOLOGY:   Non-plain film images such as CT, Ultrasound and MRI are read by the radiologist. Plain radiographic images are visualized and preliminarily interpreted by the ED Provider with the below findings:        Interpretation per the Radiologist below, if available at the time of this note:    No orders to display     [facility] Additional Contrast? None    Result Date: 3/12/2023  EXAMINATION: CT OF THE ABDOMEN AND PELVIS WITHOUT CONTRAST 3/12/2023 6:00 pm TECHNIQUE: CT of the abdomen and pelvis was performed without the administration of intravenous contrast. Multiplanar reformatted images are provided for review. Automated exposure control, iterative reconstruction, and/or weight based adjustment of the mA/kV was utilized to reduce the radiation dose to as low as reasonably achievable. COMPARISON: Written report from [facility] 12/29/2022. HISTORY: ORDERING SYSTEM PROVIDED HISTORY: Abdominal pain, rectal pain TECHNOLOGIST PROVIDED HISTORY: Reason for exam:->Abdominal pain, rectal pain Additional Contrast?->None Decision Support Exception - unselect if not a suspected or confirmed emergency medical condition->Emergency Medical Condition (MA) Reason for Exam: Abdominal pain, rectal pain FINDINGS: Lower Chest: Infiltrate in the right middle lobe is noted. The left lung is clear. Noncontrast imaging the base of the heart is unremarkable. Lack of intravenous contrast limits evaluation of the solid abdominal viscera, the hollow abdominal viscera, and the vascular structures. This is especially problematic as there is limited intraperitoneal fat, which further decreases contrast resolution. Organs: With that said, no acute hepatic abnormality identified. Gallbladder is decompressed. Noncontrast imaging of the spleen is unremarkable. Adrenals are unremarkable. Pancreas unremarkable. No acute or suspicious renal abnormalities. No hydronephrosis or hydroureter. GI/Bowel: There is a small hiatal hernia. The stomach is  fluid-filled and moderately distended.   The proximal duodenum is moderately distended to the level of the aorto mesenteric notch. Distal to that, no small bowel abnormalities are identified. There is mural thickening of the rectum with surrounding fat stranding. More proximally, the large bowel is unremarkable. The appendix is not well visualized. Pelvis: Urinary bladder and prostate unremarkable. No free pelvic fluid. Peritoneum/Retroperitoneum: Abdominal aorta normal in caliber. No lymphadenopathy. Bones/Soft Tissues: No acute or suspicious bony abnormalities. The extra-abdominal and extra pelvic soft tissues are unremarkable. Mural thickening involving the rectum with surrounding fat stranding, compatible with proctitis. There is fluid-filled distention of the distal esophagus, with fluid-filled distention of the stomach and the duodenum to the level of the aorto mesenteric notch. Given the patient's thin body habitus, SMA syndrome would be a consideration. No results found. PROCEDURES   Unless otherwise noted below, none     Procedures    CRITICAL CARE TIME (.cctime)       PAST MEDICAL HISTORY      has a past medical history of Bipolar 1 disorder ([facility] [address] Chronic generalized abdominal pain, Depression, GERD (gastroesophageal reflux disease), IBS (irritable bowel syndrome), Mental impairment, OCD (obsessive compulsive disorder), and Schizo affective schizophrenia ([facility] Chronic Conditions affecting Care: None    EMERGENCY DEPARTMENT COURSE and DIFFERENTIAL DIAGNOSIS/MDM:   Vitals:    Vitals:    03/13/23 1402 03/13/23 1449   BP: 125/87    Pulse: 93    Resp: 18    Temp:  97 °F (36.1 °C)   TempSrc: Oral Oral   SpO2: 98%    Weight: 128 lb 12.8 oz (58.4 kg)    Height: 5' 3\" (1.6 m)        Patient was given the following medications:  Medications   dicyclomine (BENTYL) injection 20 mg (20 mg IntraMUSCular Given 3/13/23 1525)             Is this patient to be included in the SEP-1 Core Measure due to severe sepsis or septic shock?    No   Exclusion criteria - the patient is NOT to be included for SEP-1 Core Measure due to: Infection is not suspected    CONSULTS: (Who and What was discussed)  None  Discussion with Other Profesionals : None    Social Determinants : None    Records Reviewed : None    CC/HPI Summary, DDx, ED Course, and Reassessment: Patient presented to the emergency department today complaining of abdominal pain, rectal pain as well as nausea and vomiting. This is chronic for patient. He has had 39 emergency department visits so far this year as well as 8 CT scans of his abdomen and pelvis within the last year. He was seen at 2 different emergency departments yesterday. CT scan did show proctitis. He was prescribed Augmentin but states he has not yet gotten it filled. He is hemodynamically stable and nonfebrile on arrival.  His abdomen is soft with bowel sounds present in all 4 quadrants. Disposition Considerations (include 1 Tests not done, Shared Decision Making, Pt Expectation of Test or Tx.): I had a lengthy discussion with the patient as well as the attending physician and we do not feel it is necessary to repeat laboratory studies or repeat CT scan of his abdomen and pelvis. Patient is agreeable with this. He is given a dose of IM Bentyl. I have reprinted his Augmentin prescription for him to take to a pharmacy of his choice to have it filled. Patient is given strict return precautions      Appropriate for outpatient management        I am the Primary Clinician of Record. FINAL IMPRESSION      1. Proctitis    2. Chronic abdominal pain    3. Medically noncompliant    4. Homelessness          DISPOSITION/PLAN     DISPOSITION Decision To Discharge 03/13/2023 03:20:49 PM      PATIENT REFERRED TO:  Renea Smith MD  [address]  [address]    Schedule an appointment as soon as possible for a visit       [facility] Emergency Department  [address]  Go to   If symptoms worsen      DISCHARGE MEDICATIONS:  Discharge Medication List as of 3/13/2023  3:36 PM          DISCONTINUED MEDICATIONS:  Discharge Medication List as of 3/13/2023  3:36 PM                 (Please note that portions of this note were completed with a voice recognition program.  Efforts were made to edit the dictations but occasionally words are mis-transcribed.)    Kinsey Graham PA-C (electronically signed)           Kinsey Graham PA-C  03/13/23 8290

## 2023-03-13 NOTE — ED PROVIDER NOTES
[address] [facility]        Pt Name: Bianca Escobedo  MRN: [id number]  [place] 1990  Date of evaluation: 3/12/2023  Provider: WENDY Ferreira  PCP: No primary care provider on file. Note Started: 12:10 AM EDT 3/13/23       I have seen and evaluated this patient with my supervising physician Dr Charity Benitez       Chief Complaint   Patient presents with    Abdominal Pain    Rectal Pain     Pt states he has abdominal pain and rectal pain due to rectal prolapse for several years. HISTORY OF PRESENT ILLNESS: 1 or more Elements     History From: Patient  Limitations to history : None    Bianca Escobedo is a 27 y.o. male who presents to the ER today with complaints of abdominal pain, rectal pain. Patient reports that he has been experiencing the symptoms for several months, and just has not gotten any answers. Patient reports that he was seen earlier today at another hospital in [place], and took a cab here for another opinion. Nursing Notes were all reviewed and agreed with or any disagreements were addressed in the HPI. REVIEW OF SYSTEMS :      Review of Systems   Constitutional:  Negative for chills and fever. Respiratory:  Negative for cough and shortness of breath. Cardiovascular:  Negative for chest pain and palpitations. Gastrointestinal:  Positive for abdominal pain. Negative for blood in stool, constipation, diarrhea, nausea and vomiting. Rectal pain   Genitourinary:  Negative for dysuria, frequency and urgency. Psychiatric/Behavioral:  Negative for agitation and confusion. Positives and Pertinent negatives as per HPI.      SURGICAL HISTORY     Past Surgical History:   Procedure Laterality Date    COLONOSCOPY      does not know    EGD      does not know       CURRENTMEDICATIONS       Discharge Medication List as of 3/13/2023 12:10 AM        CONTINUE these medications which have NOT CHANGED    Details   famotidine (PEPCID) 20 MG tablet Take 1 tablet by mouth 2 times daily for 14 days, Disp-28 tablet, R-0Print      ondansetron (ZOFRAN) 4 MG tablet Take 1 tablet by mouth every 8 hours as needed for Nausea, Disp-10 tablet, R-0Print      dicyclomine (BENTYL) 10 MG capsule Take 1 capsule by mouth 4 times daily for 10 days, Disp-40 capsule, R-0Normal      aspirin 81 MG EC tablet Take 81 mg by mouth dailyHistorical Med      Fesoterodine Fumarate ER 8 MG TB24 Take 8 mg by mouth every eveningHistorical Med             ALLERGIES     Latex, Adhesive tape, Codeine, Iodine, and Shellfish-derived products    FAMILYHISTORY       Family History   Problem Relation Age of Onset    Colon Cancer Neg Hx     Esophageal Cancer Neg Hx     Liver Cancer Neg Hx     Rectal Cancer Neg Hx     Stomach Cancer Neg Hx         SOCIAL HISTORY       Social History     Tobacco Use    Smoking status: Never    Smokeless tobacco: Never   Substance Use Topics    Alcohol use: Never    Drug use: Never       SCREENINGS        [place] Coma Scale  Eye Opening: Spontaneous  Best Verbal Response: Oriented  Best Motor Response: Obeys commands  [place] Coma Scale Score: 15                CIWA Assessment  BP: 111/72  Heart Rate: 63           PHYSICAL EXAM  1 or more Elements     ED Triage Vitals   BP Temp Temp Source Heart Rate Resp SpO2 Height Weight   03/12/23 2024 03/12/23 2024 03/12/23 2024 03/12/23 2024 03/12/23 2024 03/12/23 2024 03/12/23 2017 03/12/23 2017   121/75 97.1 °F (36.2 °C) Oral 88 18 100 % 5' 3\" (1.6 m) 127 lb 13.9 oz (58 kg)       Physical Exam  Vitals and nursing note reviewed. Constitutional:       General: He is not in acute distress. Appearance: He is well-developed. He is not ill-appearing, toxic-appearing or diaphoretic. HENT:      Head: Normocephalic and atraumatic. Mouth/Throat:      Mouth: Mucous membranes are moist.      Pharynx: Oropharynx is clear.    Eyes:      Conjunctiva/sclera: Conjunctivae normal.      Pupils: Pupils are equal, round, and reactive to light. Cardiovascular:      Rate and Rhythm: Normal rate and regular rhythm. Heart sounds: Normal heart sounds. Pulmonary:      Effort: Pulmonary effort is normal. No respiratory distress. Breath sounds: Normal breath sounds. Abdominal:      General: Abdomen is flat. Bowel sounds are normal. There is no distension. Palpations: Abdomen is soft. Tenderness: There is no abdominal tenderness. There is no right CVA tenderness, left CVA tenderness, guarding or rebound. Genitourinary:     Prostate: Normal.      Rectum: Normal. No mass or tenderness. Comments: DONTAE performed with ANTON Lowe present at bedside. No mass, no rectal prolapse. No abnormal tone. Skin:     General: Skin is warm and dry. Findings: No rash. Neurological:      General: No focal deficit present. Mental Status: He is alert and oriented to person, place, and time. Psychiatric:         Mood and Affect: Mood normal.         Behavior: Behavior normal. Behavior is cooperative. DIAGNOSTIC RESULTS   LABS:    Labs Reviewed   CBC WITH AUTO DIFFERENTIAL - Abnormal; Notable for the following components:       Result Value    RBC 4.15 (*)     Hemoglobin 12.8 (*)     Hematocrit 39.3 (*)     All other components within normal limits   BASIC METABOLIC PANEL W/ REFLEX TO MG FOR LOW K - Abnormal; Notable for the following components:    Creatinine 0.8 (*)     All other components within normal limits   BLOOD GAS, VENOUS - Abnormal; Notable for the following components:    pH, Carlos Manuel 7.333 (*)     pCO2, Carlos Manuel 54.7 (*)     All other components within normal limits   URINALYSIS WITH REFLEX TO CULTURE   LACTIC ACID       When ordered only abnormal lab results are displayed. All other labs were within normal range or not returned as of this dictation. EKG:  When ordered, EKG's are interpreted by the Emergency Department Physician in the absence of a cardiologist.  Please see their note for interpretation of EKG. RADIOLOGY:   Non-plain film images such as CT, Ultrasound and MRI are read by the radiologist. Plain radiographic images are visualized and preliminarily interpreted by the ED Provider with the below findings:    Interpretation per the Radiologist below, if available at the time of this note:    CT ABDOMEN PELVIS WO CONTRAST Additional Contrast? None   Final Result   Mural thickening involving the rectum with surrounding fat stranding,   compatible with proctitis. There is fluid-filled distention of the distal esophagus, with fluid-filled   distention of the stomach and the duodenum to the level of the aorto   mesenteric notch. Given the patient's thin body habitus, SMA syndrome would   be a consideration. CT ABDOMEN PELVIS WO CONTRAST Additional Contrast? None    Result Date: 3/12/2023  EXAMINATION: CT OF THE ABDOMEN AND PELVIS WITHOUT CONTRAST 3/12/2023 6:00 pm TECHNIQUE: CT of the abdomen and pelvis was performed without the administration of intravenous contrast. Multiplanar reformatted images are provided for review. Automated exposure control, iterative reconstruction, and/or weight based adjustment of the mA/kV was utilized to reduce the radiation dose to as low as reasonably achievable. COMPARISON: Written report from [facility] 12/29/2022. HISTORY: ORDERING SYSTEM PROVIDED HISTORY: Abdominal pain, rectal pain TECHNOLOGIST PROVIDED HISTORY: Reason for exam:->Abdominal pain, rectal pain Additional Contrast?->None Decision Support Exception - unselect if not a suspected or confirmed emergency medical condition->Emergency Medical Condition (MA) Reason for Exam: Abdominal pain, rectal pain FINDINGS: Lower Chest: Infiltrate in the right middle lobe is noted. The left lung is clear. Noncontrast imaging the base of the heart is unremarkable.  Lack of intravenous contrast limits evaluation of the solid abdominal viscera, the hollow abdominal viscera, and the vascular structures. This is especially problematic as there is limited intraperitoneal fat, which further decreases contrast resolution. Organs: With that said, no acute hepatic abnormality identified. Gallbladder is decompressed. Noncontrast imaging of the spleen is unremarkable. Adrenals are unremarkable. Pancreas unremarkable. No acute or suspicious renal abnormalities. No hydronephrosis or hydroureter. GI/Bowel: There is a small hiatal hernia. The stomach is  fluid-filled and moderately distended. The proximal duodenum is moderately distended to the level of the aorto mesenteric notch. Distal to that, no small bowel abnormalities are identified. There is mural thickening of the rectum with surrounding fat stranding. More proximally, the large bowel is unremarkable. The appendix is not well visualized. Pelvis: Urinary bladder and prostate unremarkable. No free pelvic fluid. Peritoneum/Retroperitoneum: Abdominal aorta normal in caliber. No lymphadenopathy. Bones/Soft Tissues: No acute or suspicious bony abnormalities. The extra-abdominal and extra pelvic soft tissues are unremarkable. Mural thickening involving the rectum with surrounding fat stranding, compatible with proctitis. There is fluid-filled distention of the distal esophagus, with fluid-filled distention of the stomach and the duodenum to the level of the aorto mesenteric notch. Given the patient's thin body habitus, SMA syndrome would be a consideration. No results found. PROCEDURES   Unless otherwise noted below, none     Procedures    PAST MEDICAL HISTORY      has a past medical history of Bipolar 1 disorder ([address] Chronic generalized abdominal pain, Depression, GERD (gastroesophageal reflux disease), IBS (irritable bowel syndrome), Mental impairment, OCD (obsessive compulsive disorder), and Schizo affective schizophrenia ([address]      EMERGENCY DEPARTMENT COURSE and DIFFERENTIAL DIAGNOSIS/MDM:   Vitals:    Vitals:    03/12/23 2017 03/12/23 2024 03/13/23 0008   BP:  121/75 111/72   Pulse:  88 63   Resp:  18 18   Temp:  97.1 °F (36.2 °C) 97.1 °F (36.2 °C)   TempSrc:  Oral Oral   SpO2:  100% 97%   Weight: 127 lb 13.9 oz (58 kg)     Height: 5' 3\" (1.6 m)         Patient was given the following medications:  Medications   acetaminophen (TYLENOL) tablet 650 mg (650 mg Oral Given 3/12/23 2142)   amoxicillin-clavulanate (AUGMENTIN) 875-125 MG per tablet 1 tablet (1 tablet Oral Given 3/13/23 0007)             Is this patient to be included in the SEP-1 Core Measure due to severe sepsis or septic shock? No   Exclusion criteria - the patient is NOT to be included for SEP-1 Core Measure due to: Infection is not suspected    Chronic Conditions affecting care:    has a past medical history of Bipolar 1 disorder ([facility] [address] [facility] Chronic generalized abdominal pain, Depression, GERD (gastroesophageal reflux disease), IBS (irritable bowel syndrome), Mental impairment, OCD (obsessive compulsive disorder), and Schizo affective schizophrenia ([facility] [address] CONSULTS: (Who and What was discussed)  IP CONSULT TO GENERAL SURGERY      Social Determinants Significantly Affecting Health : Patient is Homeless and Patient lacks transportation    Records Reviewed (External and Source) EMR. Patient has extensive and recurrent overuse of emergency departments, he has been seen 39 times in the emergency department so far in 2023. He has been multiple times this week in [facility] emergency departments and [facility] emergency departments. When he presents he is complaining of the same symptoms. He states his symptoms have been present for several weeks and he denies any acute changes recently or today. CC/HPI Summary, DDx, ED Course, and Reassessment: This is a 27year old male who presents to the ER with complaints of abdominal pain, rectal pain that has been present for several months with no acute change.      - Vital signs were reviewed. Exam as above. Peripheral IV placed. Labs, Imaging ordered. - Pertinent Labs & Imaging studies reviewed. (See chart for details)   -  Patient seen and evaluated in the emergency department. -  Triage and nursing notes reviewed and incorporated. -  Old chart records reviewed and incorporated. -  Code status: FULL  -   I have seen and evaluated this patient with my supervising physician Dr Tiara Kovacs. -  Differential diagnosis includes: kidney stone, pyelonephritis, UTI, appendicitis, bowel obstruction, diverticulitis, hernia, gastritis/gastroenteritis, pancreatitis, cholecystitis, hepatitis, constipation, IBS, IBD  -  Work-up included:  See above  -  ED treatment included:  Augmentin PO  - Consults: General surgery - I spoke with Dr Lupe Zamudio who advised that the patient does not need to be admitted at this time, but that the possibility of SMA syndrome is pretty rare, given his symptoms. He recommended outpatient antibiotics to treat the proctitis and to follow-up with general surgery as an outpatient.  -  Results discussed with patient and/or family. Labs show no leukocytosis, hemoglobin is 12.8, hematocrit is 39.3. Metabolic panel with no concerning abnormalities. Urine with no evidence of infection. Imaging studies show mural thickening involving the rectum with surrounding fat stranding compatible with proctitis, and fluid-filled distention of the distal esophagus, stomach, and duodenum to the level of the aorta mesenteric notch that could be due to SMA syndrome. Patient really does not have any symptoms of nausea or vomiting, he is tolerating p.o. without difficulty. Patient feels improved and is sleeping soundly in the emergency room on reevaluation. At this time, we recommend discharge, as the patient is stable for outpatient management.  The patient and/or family is agreeable with plan of care and disposition.  -  Disposition:  Home in stable condition  - Critical Care:  0 minutes      FINAL IMPRESSION      1. Proctitis    2.  Chronic abdominal pain          DISPOSITION/PLAN     DISPOSITION Decision To Discharge 03/12/2023 11:35:24 PM      PATIENT REFERRED TO:  [address] [facility]  Located in: [address] [place] [address],Second Floor  Address: [address] [place] [address], [facility], [address] [facility]  Phone: [phone]  Schedule an appointment as soon as possible for a visit       [facility] Emergency Department  [address] [place] [address]  [phone]    If symptoms worsen      DISCHARGE MEDICATIONS:  Discharge Medication List as of 3/13/2023 12:10 AM        START taking these medications    Details   amoxicillin-clavulanate (AUGMENTIN) 875-125 MG per tablet Take 1 tablet by mouth 2 times daily for 10 days, Disp-20 tablet, R-0Print             DISCONTINUED MEDICATIONS:  Discharge Medication List as of 3/13/2023 12:10 AM                 (Please note that portions of this note were completed with a voice recognition program.  Efforts were made to edit the dictations but occasionally words are mis-transcribed.)    WENDY Jackson (electronically signed)       Sandra Jackson  03/13/23 9142

## 2023-03-13 NOTE — ED PROVIDER NOTES
I independently saw performed a substantive portion of the visit (history, physical, and MDM) on [address]. All diagnostic, treatment, and disposition decisions were made by myself in conjunction with the advanced practice provider. I have participated in the medical decision making and directed the treatment plan and disposition of the patient. For further details of [facility] emergency department encounter, please see the advanced practice provider's documentation. Samson Rodriguez MD, am the primary physician provider of record. CHIEF COMPLAINT  Chief Complaint   Patient presents with    Abdominal Pain     Pt states lower abd pain, going to bottom and legs, been going on for months, been seen for same, pt states shots have helped in past, pt was with [facility] but not anymore     Briefly, [address] is a 27 y.o. male  who presents to the ED complaining of chronic abdominal pain mostly in the lower abdomen. He says that this been going on for months and has many ED visits already this year at multiple different facilities for the same complaint, this being his ninth ED visit already this month alone including twice yesterday at different facilities. He did have a CT of the abdomen and pelvis yesterday showing some proctitis type changes and was prescribed Augmentin but he did not fill it. He says to me that IM Bentyl helps with his symptoms and is requesting this medication. He is homeless. He is noncompliant with medications. Previous documentation suggests significant overuse of emergency departments. Denies fevers. He is here today wondering why he is still in pain yet does not seem to understand that he has not initiated or started any of the treatments that are intended for him.     FOCUSED PHYSICAL EXAMINATION  /87   Pulse 93   Temp 97 °F (36.1 °C) (Oral)   Resp 18   Ht 5' 3\" (1.6 m)   Wt 128 lb 12.8 oz (58.4 kg)   SpO2 98%   BMI 22.82 kg/m²   Focused physical examination notable for no acute distress, well-appearing, well-nourished, normal speech and mentation without obvious facial droop, no obvious rash.  No obvious cranial nerve deficits on my initial exam. Abd soft, minimal periumbilical ttp, no other abd ttp or peritonitis or distention.      EKG performed in ED:  None        ED COURSE/MDM  Patient seen and evaluated. Old records reviewed. Labs and imaging reviewed.      After initial evaluation, differential diagnostic considerations included but not limited to: kidney stone, pyelonephritis, UTI, appendicitis, bowel obstruction, diverticulitis, hernia, gastritis/gastroenteritis, pancreatitis, cholecystitis, hepatitis, constipation, IBS, IBD    The patient's ED workup was notable for chronic abdominal pains.  The patient is noncompliant with intended medical treatment.  He has 2 other ED visits in the last 48 hours at different facilities prior to this evaluation today and over 30 already this year for the same complaint.  He has had extensive work-up in the past before.  Recently diagnosed with proctitis and we did write a paper prescription and handed to him for the Augmentin in case he has trouble getting it at a specific pharmacy.  He has stable reassuring vital signs.  Given labs that were reviewed and done within the last 24 hours as well as CT abdomen and pelvis in the last 24 hours with the same complaint I do feel that we can proceed with the originally intended plan of antibiotics and outpatient management.  We did agree to give him an IM dose of Bentyl here but he needs to follow-up with PCP.  He can return to the ED with any new or worsening symptoms but at this time he does not have any acute worsening and as such I consider repeat imaging or labs but do not feel it is necessary at this time.  He has no peritonitis.      Is this patient to be included in the SEP-1 Core Measure?  No   Exclusion criteria - the patient is NOT to be  included for SEP-1 Core Measure due to:  2+ SIRS criteria are not met      Consults:  None    History obtained from: Patient    Pertinent social determinants of health: No insurance, Difficulty obtaining or affording medications, and Homelessness       Chronic conditions potentially affecting care:  chronic abd pain    Review of other records:  CareEverywhere encounter for non-[facility] ED visit from yesterday ([facility] and yesterday ([facility])    Reassessment:  See [facility] for details of medications given and reassessment    During the patient's ED course, the patient was given:  Medications   dicyclomine (BENTYL) injection 20 mg (20 mg IntraMUSCular Given 3/13/23 0135)        CLINICAL IMPRESSION  1. Proctitis    2. Chronic abdominal pain    3. Medically noncompliant    4. Homelessness        Blood pressure 125/87, pulse 93, temperature 97 °F (36.1 °C), temperature source Oral, resp. rate 18, height 5' 3\" (1.6 m), weight 128 lb 12.8 oz (58.4 kg), SpO2 98 %. DISPOSITION  Tamiko Orellana was discharged in stable condition. I have discussed the findings of today's workup with the patient and addressed the patient's questions and concerns. Important warning signs as well as new or worsening symptoms which would necessitate immediate return to the ED were discussed. The plan is to discharge from the ED at this time, and the patient is in stable condition. The patient acknowledged understanding is agreeable with this plan. Follow-up with:  German Simon MD  [address] [place] [address]  [phone]    Schedule an appointment as soon as possible for a visit       [facility] Emergency Department  [address] [place] [address]  [phone]  Go to   If symptoms worsen    Critical care time:  None    DISCLAIMER: This chart was created using Dragon dictation software.   Efforts were made by me to ensure accuracy, however some errors may be present due to limitations of this technology and occasionally words are not transcribed correctly.         Odalis Sandoval MD  03/13/23 0142

## 2023-03-13 NOTE — ED TRIAGE NOTES
Pt took a taxi here from [employer] near [place]. Pt states that he left there to come and get surgery for a rectal prolapse and abdominal pain. Pt states he has not taken his medication for approximately a month. Pt has been seen at [facility] for this issue. Pt has PMH bipolar 1 disorder, depression, GERD, IBS, Mental impairment, OCD, Schizo affective.

## 2023-03-23 ENCOUNTER — HOSPITAL ENCOUNTER (EMERGENCY)
Age: 33
Discharge: HOME | End: 2023-03-23
Attending: EMERGENCY MEDICINE
Payer: COMMERCIAL

## 2023-03-23 ENCOUNTER — APPOINTMENT (OUTPATIENT)
Dept: CT IMAGING | Age: 33
End: 2023-03-23
Payer: COMMERCIAL

## 2023-03-23 ENCOUNTER — HOSPITAL ENCOUNTER (EMERGENCY)
Age: 33
Discharge: HOME | End: 2023-03-23
Payer: COMMERCIAL

## 2023-03-23 VITALS
RESPIRATION RATE: 16 BRPM | HEART RATE: 91 BPM | OXYGEN SATURATION: 100 % | SYSTOLIC BLOOD PRESSURE: 116 MMHG | WEIGHT: 123.3 LBS | HEIGHT: 63 IN | DIASTOLIC BLOOD PRESSURE: 72 MMHG | BODY MASS INDEX: 21.85 KG/M2 | TEMPERATURE: 97.6 F

## 2023-03-23 VITALS
OXYGEN SATURATION: 100 % | DIASTOLIC BLOOD PRESSURE: 72 MMHG | HEART RATE: 109 BPM | TEMPERATURE: 98.2 F | SYSTOLIC BLOOD PRESSURE: 113 MMHG | RESPIRATION RATE: 16 BRPM

## 2023-03-23 DIAGNOSIS — K52.9 COLITIS: Primary | ICD-10-CM

## 2023-03-23 DIAGNOSIS — R10.84 GENERALIZED ABDOMINAL PAIN: Primary | ICD-10-CM

## 2023-03-23 PROCEDURE — 6370000000 HC RX 637 (ALT 250 FOR IP): Performed by: EMERGENCY MEDICINE

## 2023-03-23 PROCEDURE — 74176 CT ABD & PELVIS W/O CONTRAST: CPT

## 2023-03-23 PROCEDURE — 99284 EMERGENCY DEPT VISIT MOD MDM: CPT

## 2023-03-23 PROCEDURE — 96372 THER/PROPH/DIAG INJ SC/IM: CPT

## 2023-03-23 PROCEDURE — 6360000002 HC RX W HCPCS: Performed by: PHYSICIAN ASSISTANT

## 2023-03-23 RX ORDER — CIPROFLOXACIN 500 MG/1
500 TABLET, FILM COATED ORAL 2 TIMES DAILY
Qty: 20 TABLET | Refills: 0 | Status: SHIPPED | OUTPATIENT
Start: 2023-03-23 | End: 2023-04-02

## 2023-03-23 RX ORDER — CIPROFLOXACIN 500 MG/1
500 TABLET, FILM COATED ORAL ONCE
Status: COMPLETED | OUTPATIENT
Start: 2023-03-23 | End: 2023-03-23

## 2023-03-23 RX ORDER — METRONIDAZOLE 500 MG/1
500 TABLET ORAL 2 TIMES DAILY
Qty: 20 TABLET | Refills: 0 | Status: SHIPPED | OUTPATIENT
Start: 2023-03-23 | End: 2023-04-02

## 2023-03-23 RX ORDER — DICYCLOMINE HYDROCHLORIDE 10 MG/ML
20 INJECTION INTRAMUSCULAR ONCE
Status: COMPLETED | OUTPATIENT
Start: 2023-03-23 | End: 2023-03-23

## 2023-03-23 RX ORDER — NAPROXEN 500 MG/1
500 TABLET ORAL 2 TIMES DAILY
Qty: 20 TABLET | Refills: 0 | Status: SHIPPED | OUTPATIENT
Start: 2023-03-23 | End: 2023-04-02

## 2023-03-23 RX ORDER — NAPROXEN 500 MG/1
500 TABLET ORAL ONCE
Status: COMPLETED | OUTPATIENT
Start: 2023-03-23 | End: 2023-03-23

## 2023-03-23 RX ORDER — OXYCODONE HYDROCHLORIDE AND ACETAMINOPHEN 5; 325 MG/1; MG/1
1-2 TABLET ORAL EVERY 6 HOURS PRN
Qty: 6 TABLET | Refills: 0 | Status: SHIPPED | OUTPATIENT
Start: 2023-03-23 | End: 2023-03-30

## 2023-03-23 RX ADMIN — CIPROFLOXACIN 500 MG: 500 TABLET, FILM COATED ORAL at 02:10

## 2023-03-23 RX ADMIN — NAPROXEN 500 MG: 500 TABLET ORAL at 02:10

## 2023-03-23 RX ADMIN — DICYCLOMINE HYDROCHLORIDE 20 MG: 10 INJECTION, SOLUTION INTRAMUSCULAR at 13:22

## 2023-03-23 ASSESSMENT — LIFESTYLE VARIABLES
HOW MANY STANDARD DRINKS CONTAINING ALCOHOL DO YOU HAVE ON A TYPICAL DAY: PATIENT DOES NOT DRINK
HOW MANY STANDARD DRINKS CONTAINING ALCOHOL DO YOU HAVE ON A TYPICAL DAY: PATIENT DOES NOT DRINK
HOW OFTEN DO YOU HAVE A DRINK CONTAINING ALCOHOL: NEVER
HOW OFTEN DO YOU HAVE A DRINK CONTAINING ALCOHOL: NEVER

## 2023-03-23 ASSESSMENT — PAIN DESCRIPTION - LOCATION
LOCATION: ABDOMEN
LOCATION: ABDOMEN

## 2023-03-23 ASSESSMENT — PAIN SCALES - GENERAL
PAINLEVEL_OUTOF10: 10
PAINLEVEL_OUTOF10: 10

## 2023-03-23 ASSESSMENT — ENCOUNTER SYMPTOMS
BACK PAIN: 0
BLOOD IN STOOL: 0
RECTAL PAIN: 0
NAUSEA: 0
CHEST TIGHTNESS: 0
SHORTNESS OF BREATH: 0
RESPIRATORY NEGATIVE: 1
VOMITING: 0
CONSTIPATION: 0
WHEEZING: 0
DIARRHEA: 0
ABDOMINAL PAIN: 1
COUGH: 0

## 2023-03-23 ASSESSMENT — PAIN - FUNCTIONAL ASSESSMENT
PAIN_FUNCTIONAL_ASSESSMENT: 0-10
PAIN_FUNCTIONAL_ASSESSMENT: 0-10

## 2023-03-23 NOTE — ED NOTES
Pt disagreeable to discharge, states \"they have to admit me for not feeling well. \" Attempted to educate patient that this was not a reason to admit. Pt requested money for food, boxed lunch given. Pt requested cab to [facility]. Informed patient that we could provide a [employer] bus pass or a Lyft to a local place, but not [facility]. Pt states  \"The hospital has to pay for me to get to [place]. \" Informed the patient that the hospital did not have to pay for a Lyft to [place], but could pay for a Lyft to get to [facility]. Patient requested to be taken to [facility] in [facility]. Lyft requested, and information given to patient. Patient walked to lobby with AVS in hand.       Jn Ventura RN  03/23/23 7027

## 2023-03-23 NOTE — ED NOTES
Reviewed discharge and medications. Pt states he will not take prescribed medications and walked out angry.      Perri Santos RN  03/23/23 8450

## 2023-03-23 NOTE — ED PROVIDER NOTES
to answer questions. FINAL IMPRESSION      1. Colitis    Plan will be to treat for nonspecific colitis vital signs appear to be stable except for a mildly elevated heart rate the patient will be given Cipro Flagyl Naprosyn and some Percocet for breakthrough pain he is to follow-up with his family doctor in [place] [address] Discharge - Pending Orders Complete 03/23/2023 02:05:40 AM      PATIENT REFERRED TO:  Your PCP      As needed      DISCHARGE MEDICATIONS:  New Prescriptions    CIPROFLOXACIN (CIPRO) 500 MG TABLET    Take 1 tablet by mouth 2 times daily for 10 days    METRONIDAZOLE (FLAGYL) 500 MG TABLET    Take 1 tablet by mouth 2 times daily for 10 days    NAPROXEN (NAPROSYN) 500 MG TABLET    Take 1 tablet by mouth 2 times daily for 20 doses    OXYCODONE-ACETAMINOPHEN (PERCOCET) 5-325 MG PER TABLET    Take 1-2 tablets by mouth every 6 hours as needed for Pain for up to 7 days.  Max Daily Amount: 8 tablets       DISCONTINUED MEDICATIONS:  Discontinued Medications    No medications on file              (Please note that portions of this note were completed with a voice recognition program.  Efforts were made to edit the dictations but occasionally words are mis-transcribed.)    Shan Puente MD (electronically signed)       Shan Puente MD  03/23/23 7466

## 2023-03-23 NOTE — ED PROVIDER NOTES
[address] [facility] [address] ENCOUNTER          PHYSICIAN ASSISTANT NOTE       Date of evaluation: 3/23/2023    Chief Complaint     Abdominal Pain (C/o 10/10 pain, seen this morning at [facility] for this same pain but states \"they did nothing\". During chart review patient was diagnosed with colitis and has not filled his antibiotics. He is requesting medication for pain and not the antibiotics. Tried to educate patient regarding importance of antibiotics.)      History of Present Illness     Luna Hightower is a 27 y.o. male who presents to the emergency department with persistent abdominal pain. The patient has had multiple emergency department visits over the last several months with abdominal pain and has had several complete work-ups. He has had 2 CT scans in the last 10 days most recent 1 was approximately 12 hours ago. CT 12 hours ago showed possible early colitis and the patient was discharged with prescriptions for Cipro, Flagyl, Tylenol and oxycodone. He did not get these prescriptions filled but presented here for further evaluation of his abdominal pain. He states \"no one is doing anything and I need to get admitted\". The patient is currently homeless and is here from [place]. He denies nausea or vomiting. Denies chest pain or shortness of breath. Denies dysuria, hematuria, diarrhea or constipation. ASSESSMENT / PLAN  (MEDICAL DECISION MAKING)     INITIAL VITALS: BP: 116/72, Temp: 97.6 °F (36.4 °C), Heart Rate: 91, Resp: 16, SpO2: 100 %    Luna Hightower is a 27 y.o. male who presented to the emergency department with chronic abdominal pain. Is this patient to be included in the SEP-1 core measure due to severe sepsis or septic shock? No Exclusion criteria - the patient is NOT to be included for SEP-1 Core Measure due to:  Infection is not suspected    Medical Decision Making  Briefly this is a 77-year-old gentleman currently homeless who presents to the emergency department with abdominal pain. In review of the patient's records this is a chronic problem and he has had multiple emergency department visits for this. He has had approximately 5 visits to different hospitals in the last 3 days. He had a CT scan approximately 10 days ago as well as 1 last night. He has had complete work-up including multiple visits with labs which were unremarkable. He did have a CT last night that showed possibly early colitis and was discharged with prescriptions for antibiotics, Tylenol and oxycodone which she did not fill and has not started taking. He presented here wanting us to admit him for this pain. On my examination his abdomen is soft with no peritoneal signs. He has no pain on my examination. No CVA tenderness. Given that he has had labs within the last 24 to 48 hours I did not repeat labs here. He is tolerating food and fluids and was eating and drinking a box lunch prior to discharge. He will be discharged to take the antibiotics as prescribed. I did order a Bentyl injection prior to discharge. He can follow-up with the primary care physician. I also asked social work to give him some homeless shelter resources which she was given. He is to return to the emergency department for worsening symptoms or concerns as discussed. Risk  Prescription drug management. This patient was also evaluated by the attending physician. All care plans were discussed and agreed upon. Clinical Impression     1. Generalized abdominal pain        Disposition     PATIENT REFERRED TO:  your PCP or PCP of your choice froml list provided    Call   for follow up    DISCHARGE MEDICATIONS:  New Prescriptions    No medications on file       DISPOSITION Decision To Discharge 03/23/2023 01:19:18 PM        Diagnostic Results and Other Data     RADIOLOGY:  No orders to display       LABS:   No results found for this visit on 03/23/23. ED BEDSIDE ULTRASOUND:  No results found.     RECENT VITALS:

## 2023-03-23 NOTE — DISCHARGE INSTRUCTIONS
Discharge home  Drink plenty of fluids  Cipro, Flagyl, Naprosyn, Percocet for breakthrough pain  Follow-up with your family doctor

## 2023-03-23 NOTE — DISCHARGE INSTRUCTIONS
-take the antibiotics prescribed to you overnight. You should take these until you have taken all of them. You were also prescribed pain medication to take as needed. -Drink fluids to stay well-hydrated  -Follow-up with your primary care physician or physician of your choice from the list provided for further evaluation  -Return for worsening symptoms such as fevers of 100.5 or greater not relieved by Tylenol or Motrin, uncontrolled vomiting or other concerns      Zip Code Name Address Phone Sliding scale? Geographic Limits Ref   [address] [phone] $15 [place] residents No     [address] [facility] 87 [phone] $20 No city/county/state restrictions No   [address] [phone] $10 No city/county/state restrictions No   [address] [facility] 85 [phone] $40 No city/county/state restrictions No   [address] [facility] [address] [phone] $20 No city/county/state restrictions No   [address] [facility]. [phone] $40 No city/county/state restrictions No   [address] [phone] $25 No city/county/state restrictions No   [address] [address]  [facility] [address] [facility] [phone] $20 [place] residents Yes   [address] [phone] [facility] residents Yes   [address] [facility] [address] $20 [place] residents Yes   [address] [facility] [address] [phone] [facility] residents Yes     [address] [facility] [address] [phone] $20 [place] residents Yes     [facility] ACCEPTING SELF-PAY PATIENTS   ON SLIDING SCALE PAYMENT

## 2023-07-26 ENCOUNTER — HOSPITAL ENCOUNTER (EMERGENCY)
Age: 33
Discharge: HOME | End: 2023-07-26
Attending: EMERGENCY MEDICINE
Payer: MEDICARE

## 2023-07-26 VITALS
RESPIRATION RATE: 16 BRPM | OXYGEN SATURATION: 96 % | DIASTOLIC BLOOD PRESSURE: 83 MMHG | TEMPERATURE: 98.6 F | SYSTOLIC BLOOD PRESSURE: 149 MMHG | HEART RATE: 111 BPM

## 2023-07-26 DIAGNOSIS — F15.10 METHAMPHETAMINE USE (HCC): Primary | ICD-10-CM

## 2023-07-26 LAB
EKG ATRIAL RATE: 115 BPM
EKG DIAGNOSIS: NORMAL
EKG P AXIS: 13 DEGREES
EKG P-R INTERVAL: 86 MS
EKG Q-T INTERVAL: 348 MS
EKG QRS DURATION: 82 MS
EKG QTC CALCULATION (BAZETT): 453 MS
EKG R AXIS: 47 DEGREES
EKG T AXIS: 16 DEGREES
EKG VENTRICULAR RATE: 102 BPM

## 2023-07-26 PROCEDURE — 6360000002 HC RX W HCPCS: Performed by: EMERGENCY MEDICINE

## 2023-07-26 PROCEDURE — 93005 ELECTROCARDIOGRAM TRACING: CPT | Performed by: EMERGENCY MEDICINE

## 2023-07-26 PROCEDURE — 96372 THER/PROPH/DIAG INJ SC/IM: CPT

## 2023-07-26 PROCEDURE — 6370000000 HC RX 637 (ALT 250 FOR IP): Performed by: EMERGENCY MEDICINE

## 2023-07-26 PROCEDURE — 93010 ELECTROCARDIOGRAM REPORT: CPT | Performed by: INTERNAL MEDICINE

## 2023-07-26 PROCEDURE — 99284 EMERGENCY DEPT VISIT MOD MDM: CPT

## 2023-07-26 RX ORDER — DIPHENHYDRAMINE HCL 25 MG
50 TABLET ORAL ONCE
Status: COMPLETED | OUTPATIENT
Start: 2023-07-26 | End: 2023-07-26

## 2023-07-26 RX ORDER — LORAZEPAM 1 MG/1
1 TABLET ORAL ONCE
Status: COMPLETED | OUTPATIENT
Start: 2023-07-26 | End: 2023-07-26

## 2023-07-26 RX ORDER — KETOROLAC TROMETHAMINE 15 MG/ML
30 INJECTION, SOLUTION INTRAMUSCULAR; INTRAVENOUS ONCE
Status: COMPLETED | OUTPATIENT
Start: 2023-07-26 | End: 2023-07-26

## 2023-07-26 RX ADMIN — DIPHENHYDRAMINE HYDROCHLORIDE 50 MG: 25 TABLET ORAL at 13:50

## 2023-07-26 RX ADMIN — KETOROLAC TROMETHAMINE 30 MG: 15 INJECTION, SOLUTION INTRAMUSCULAR; INTRAVENOUS at 13:51

## 2023-07-26 RX ADMIN — LORAZEPAM 1 MG: 1 TABLET ORAL at 13:50

## 2023-07-26 ASSESSMENT — ENCOUNTER SYMPTOMS
ALLERGIC/IMMUNOLOGIC NEGATIVE: 1
EYES NEGATIVE: 1
RESPIRATORY NEGATIVE: 1
GASTROINTESTINAL NEGATIVE: 1

## 2023-07-26 ASSESSMENT — PAIN DESCRIPTION - LOCATION
LOCATION: CHEST
LOCATION: HIP

## 2023-07-26 ASSESSMENT — PAIN DESCRIPTION - ORIENTATION: ORIENTATION: RIGHT;LEFT

## 2023-07-26 ASSESSMENT — PAIN - FUNCTIONAL ASSESSMENT: PAIN_FUNCTIONAL_ASSESSMENT: 0-10

## 2023-07-26 ASSESSMENT — PAIN DESCRIPTION - DESCRIPTORS: DESCRIPTORS: POUNDING

## 2023-07-26 ASSESSMENT — PAIN SCALES - GENERAL
PAINLEVEL_OUTOF10: 10
PAINLEVEL_OUTOF10: 4

## 2023-07-26 NOTE — ED PROVIDER NOTES
[facility]      TRIAGE CHIEF COMPLAINT:   Drug Problem (Reports using meth this am and is \"hyped up\". Reports that he usually \"isn't like this when doing meth\". Pupils dilated )      [place]ANT[place]:  Fabian Badillo is a 27 y.o. male that presents with complaint of methamphetamine use. Patient states has a history of meth use he was in assisted for a while he just got out and started using methamphetamine again. Use the shoulder for arrival.  Somebody called 911 he states he is unsure why but came in for evaluation. He denies any fevers nausea vomiting chest pain shortness of breath just anxious hyperactive methamphetamine use. Denies any SI HI. No other injuries or questions or concerns denies other substance abuse. Castillo Sandoval REVIEW OF SYSTEMS:  At least 10 systems reviewed and otherwise acutely negative except as in the [address] Review of Systems   Constitutional: Negative. HENT: Negative. Eyes: Negative. Respiratory: Negative. Cardiovascular:  Positive for palpitations. Gastrointestinal: Negative. Endocrine: Negative. Genitourinary: Negative. Musculoskeletal: Negative. Skin: Negative. Allergic/Immunologic: Negative. Neurological: Negative. Hematological: Negative. Psychiatric/Behavioral:  The patient is nervous/anxious and is hyperactive. All other systems reviewed and are negative.     Past Medical History:   Diagnosis Date    Bipolar 1 disorder ([address])     Chronic generalized abdominal pain     Depression     GERD (gastroesophageal reflux disease)     IBS (irritable bowel syndrome)     Mental impairment     OCD (obsessive compulsive disorder)     Schizo affective schizophrenia ([address])      Past Surgical History:   Procedure Laterality Date    COLONOSCOPY      does not know    EGD      does not know     Family History   Problem Relation Age of Onset    Colon Cancer Neg Hx     Esophageal Cancer Neg Hx     Liver Cancer Neg Hx     Rectal Cancer Neg Hx     Stomach return precautions and follow-up information.     CLINICAL IMPRESSION:  Final diagnoses:   Methamphetamine use ([address])       (Please note that portions of this note may have been completed with a voice recognition program. Efforts were made to edit the dictations but occasionally words aremis-transcribed.)    DISPOSITION REFERRAL (if applicable):  [facility] Emergency Department  [address] [facility] [address]  [phone]    If symptoms worsen    Burt Carson MD  [address] [facility] [id number] [phone]    Schedule an appointment as soon as possible for a visit in 1 day        DISPOSITION MEDICATIONS (if applicable):  New Prescriptions    No medications on file          Evelyn Nj, [address] [facility],[address], DO  07/26/23 8246

## 2023-09-12 ENCOUNTER — APPOINTMENT (OUTPATIENT)
Dept: CT IMAGING | Age: 33
End: 2023-09-12
Payer: MEDICARE

## 2023-09-12 ENCOUNTER — HOSPITAL ENCOUNTER (EMERGENCY)
Age: 33
Discharge: HOME | End: 2023-09-13
Attending: STUDENT IN AN ORGANIZED HEALTH CARE EDUCATION/TRAINING PROGRAM
Payer: MEDICARE

## 2023-09-12 VITALS
DIASTOLIC BLOOD PRESSURE: 83 MMHG | TEMPERATURE: 98.4 F | SYSTOLIC BLOOD PRESSURE: 142 MMHG | RESPIRATION RATE: 24 BRPM | HEART RATE: 101 BPM | OXYGEN SATURATION: 100 %

## 2023-09-12 DIAGNOSIS — R10.9 ABDOMINAL PAIN, UNSPECIFIED ABDOMINAL LOCATION: Primary | ICD-10-CM

## 2023-09-12 LAB
ALBUMIN SERPL-MCNC: 4.3 GM/DL (ref 3.4–5)
ALP BLD-CCNC: 87 IU/L (ref 40–129)
ALT SERPL-CCNC: 19 U/L (ref 10–40)
ANION GAP SERPL CALCULATED.3IONS-SCNC: 9 MMOL/L (ref 4–16)
AST SERPL-CCNC: 27 IU/L (ref 15–37)
BASOPHILS ABSOLUTE: 0 K/CU MM
BASOPHILS RELATIVE PERCENT: 0.5 % (ref 0–1)
BILIRUB SERPL-MCNC: 0.3 MG/DL (ref 0–1)
BILIRUBIN URINE: NEGATIVE MG/DL
BLOOD, URINE: NEGATIVE
BUN SERPL-MCNC: 14 MG/DL (ref 6–23)
CALCIUM SERPL-MCNC: 9.2 MG/DL (ref 8.3–10.6)
CHLORIDE BLD-SCNC: 100 MMOL/L (ref 99–110)
CLARITY: CLEAR
CO2: 29 MMOL/L (ref 21–32)
COLOR: YELLOW
COMMENT UA: NORMAL
CREAT SERPL-MCNC: 0.9 MG/DL (ref 0.9–1.3)
DIFFERENTIAL TYPE: ABNORMAL
EOSINOPHILS ABSOLUTE: 0.7 K/CU MM
EOSINOPHILS RELATIVE PERCENT: 10.2 % (ref 0–3)
GFR SERPL CREATININE-BSD FRML MDRD: >60 ML/MIN/1.73M2
GLUCOSE SERPL-MCNC: 108 MG/DL (ref 70–99)
GLUCOSE, URINE: NEGATIVE MG/DL
HCT VFR BLD CALC: 37 % (ref 42–52)
HEMOGLOBIN: 12 GM/DL (ref 13.5–18)
IMMATURE NEUTROPHIL %: 0.3 % (ref 0–0.43)
KETONES, URINE: NEGATIVE MG/DL
LEUKOCYTE ESTERASE, URINE: NEGATIVE
LIPASE: 62 IU/L (ref 13–60)
LYMPHOCYTES ABSOLUTE: 1.9 K/CU MM
LYMPHOCYTES RELATIVE PERCENT: 29.7 % (ref 24–44)
MAGNESIUM: 2.1 MG/DL (ref 1.8–2.4)
MCH RBC QN AUTO: 30.8 PG (ref 27–31)
MCHC RBC AUTO-ENTMCNC: 32.4 % (ref 32–36)
MCV RBC AUTO: 95.1 FL (ref 78–100)
MONOCYTES ABSOLUTE: 0.6 K/CU MM
MONOCYTES RELATIVE PERCENT: 9.6 % (ref 0–4)
NITRITE URINE, QUANTITATIVE: NEGATIVE
NUCLEATED RBC %: 0 %
PDW BLD-RTO: 12.5 % (ref 11.7–14.9)
PH, URINE: 6 (ref 5–8)
PLATELET # BLD: 264 K/CU MM (ref 140–440)
PMV BLD AUTO: 9.7 FL (ref 7.5–11.1)
POTASSIUM SERPL-SCNC: 3.7 MMOL/L (ref 3.5–5.1)
PROTEIN UA: NEGATIVE MG/DL
RBC # BLD: 3.89 M/CU MM (ref 4.6–6.2)
SEGMENTED NEUTROPHILS ABSOLUTE COUNT: 3.2 K/CU MM
SEGMENTED NEUTROPHILS RELATIVE PERCENT: 49.7 % (ref 36–66)
SODIUM BLD-SCNC: 138 MMOL/L (ref 135–145)
SPECIFIC GRAVITY UA: 1.02 (ref 1–1.03)
TOTAL IMMATURE NEUTOROPHIL: 0.02 K/CU MM
TOTAL NUCLEATED RBC: 0 K/CU MM
TOTAL PROTEIN: 6.9 GM/DL (ref 6.4–8.2)
UROBILINOGEN, URINE: 1 MG/DL (ref 0.2–1)
WBC # BLD: 6.4 K/CU MM (ref 4–10.5)

## 2023-09-12 PROCEDURE — 74176 CT ABD & PELVIS W/O CONTRAST: CPT

## 2023-09-12 PROCEDURE — 83735 ASSAY OF MAGNESIUM: CPT

## 2023-09-12 PROCEDURE — 2580000003 HC RX 258: Performed by: STUDENT IN AN ORGANIZED HEALTH CARE EDUCATION/TRAINING PROGRAM

## 2023-09-12 PROCEDURE — 83690 ASSAY OF LIPASE: CPT

## 2023-09-12 PROCEDURE — 80053 COMPREHEN METABOLIC PANEL: CPT

## 2023-09-12 PROCEDURE — 6360000002 HC RX W HCPCS: Performed by: STUDENT IN AN ORGANIZED HEALTH CARE EDUCATION/TRAINING PROGRAM

## 2023-09-12 PROCEDURE — 85025 COMPLETE CBC W/AUTO DIFF WBC: CPT

## 2023-09-12 PROCEDURE — 81003 URINALYSIS AUTO W/O SCOPE: CPT

## 2023-09-12 PROCEDURE — 99284 EMERGENCY DEPT VISIT MOD MDM: CPT

## 2023-09-12 PROCEDURE — 96374 THER/PROPH/DIAG INJ IV PUSH: CPT

## 2023-09-12 RX ORDER — 0.9 % SODIUM CHLORIDE 0.9 %
1000 INTRAVENOUS SOLUTION INTRAVENOUS ONCE
Status: COMPLETED | OUTPATIENT
Start: 2023-09-12 | End: 2023-09-12

## 2023-09-12 RX ORDER — FENTANYL CITRATE 50 UG/ML
50 INJECTION, SOLUTION INTRAMUSCULAR; INTRAVENOUS ONCE
Status: COMPLETED | OUTPATIENT
Start: 2023-09-12 | End: 2023-09-12

## 2023-09-12 RX ADMIN — FENTANYL CITRATE 50 MCG: 50 INJECTION, SOLUTION INTRAMUSCULAR; INTRAVENOUS at 21:46

## 2023-09-12 RX ADMIN — SODIUM CHLORIDE 1000 ML: 9 INJECTION, SOLUTION INTRAVENOUS at 21:44

## 2023-09-12 ASSESSMENT — PAIN SCALES - GENERAL: PAINLEVEL_OUTOF10: 10

## 2023-09-13 ENCOUNTER — APPOINTMENT (OUTPATIENT)
Dept: GENERAL RADIOLOGY | Age: 33
End: 2023-09-13
Payer: MEDICARE

## 2023-09-13 ENCOUNTER — HOSPITAL ENCOUNTER (EMERGENCY)
Age: 33
Discharge: HOME | End: 2023-09-13
Attending: EMERGENCY MEDICINE
Payer: MEDICARE

## 2023-09-13 VITALS
TEMPERATURE: 97.7 F | OXYGEN SATURATION: 100 % | DIASTOLIC BLOOD PRESSURE: 59 MMHG | RESPIRATION RATE: 11 BRPM | HEIGHT: 63 IN | WEIGHT: 114 LBS | SYSTOLIC BLOOD PRESSURE: 95 MMHG | BODY MASS INDEX: 20.2 KG/M2 | HEART RATE: 56 BPM

## 2023-09-13 DIAGNOSIS — R11.2 NAUSEA AND VOMITING, UNSPECIFIED VOMITING TYPE: ICD-10-CM

## 2023-09-13 DIAGNOSIS — J18.9 PNEUMONIA DUE TO INFECTIOUS ORGANISM, UNSPECIFIED LATERALITY, UNSPECIFIED PART OF LUNG: Primary | ICD-10-CM

## 2023-09-13 DIAGNOSIS — R07.9 CHEST PAIN, UNSPECIFIED TYPE: ICD-10-CM

## 2023-09-13 DIAGNOSIS — R10.9 ABDOMINAL PAIN, UNSPECIFIED ABDOMINAL LOCATION: ICD-10-CM

## 2023-09-13 DIAGNOSIS — R07.89 CHEST WALL PAIN: ICD-10-CM

## 2023-09-13 LAB
ALBUMIN SERPL-MCNC: 4.5 GM/DL (ref 3.4–5)
ALP BLD-CCNC: 86 IU/L (ref 40–129)
ALT SERPL-CCNC: 19 U/L (ref 10–40)
AMPHETAMINES: ABNORMAL
ANION GAP SERPL CALCULATED.3IONS-SCNC: 8 MMOL/L (ref 4–16)
AST SERPL-CCNC: 27 IU/L (ref 15–37)
BARBITURATE SCREEN URINE: NEGATIVE
BASOPHILS ABSOLUTE: 0 K/CU MM
BASOPHILS RELATIVE PERCENT: 0.4 % (ref 0–1)
BENZODIAZEPINE SCREEN, URINE: NEGATIVE
BILIRUB SERPL-MCNC: 0.3 MG/DL (ref 0–1)
BILIRUBIN URINE: NEGATIVE MG/DL
BLOOD, URINE: NEGATIVE
BUN SERPL-MCNC: 14 MG/DL (ref 6–23)
CALCIUM SERPL-MCNC: 8.8 MG/DL (ref 8.3–10.6)
CANNABINOID SCREEN URINE: NEGATIVE
CHLORIDE BLD-SCNC: 103 MMOL/L (ref 99–110)
CLARITY: CLEAR
CO2: 28 MMOL/L (ref 21–32)
COCAINE METABOLITE: NEGATIVE
COLOR: YELLOW
COMMENT UA: NORMAL
CREAT SERPL-MCNC: 1 MG/DL (ref 0.9–1.3)
DIFFERENTIAL TYPE: ABNORMAL
EKG ATRIAL RATE: 52 BPM
EKG DIAGNOSIS: NORMAL
EKG P AXIS: -17 DEGREES
EKG P-R INTERVAL: 94 MS
EKG Q-T INTERVAL: 456 MS
EKG QRS DURATION: 100 MS
EKG QTC CALCULATION (BAZETT): 424 MS
EKG R AXIS: 33 DEGREES
EKG T AXIS: 28 DEGREES
EKG VENTRICULAR RATE: 52 BPM
EOSINOPHILS ABSOLUTE: 0.5 K/CU MM
EOSINOPHILS RELATIVE PERCENT: 6.9 % (ref 0–3)
FENTANYL URINE: ABNORMAL
GFR SERPL CREATININE-BSD FRML MDRD: >60 ML/MIN/1.73M2
GLUCOSE SERPL-MCNC: 92 MG/DL (ref 70–99)
GLUCOSE, URINE: NEGATIVE MG/DL
HCT VFR BLD CALC: 38.8 % (ref 42–52)
HEMOGLOBIN: 12.3 GM/DL (ref 13.5–18)
IMMATURE NEUTROPHIL %: 0.3 % (ref 0–0.43)
KETONES, URINE: NEGATIVE MG/DL
LEUKOCYTE ESTERASE, URINE: NEGATIVE
LIPASE: 50 IU/L (ref 13–60)
LYMPHOCYTES ABSOLUTE: 1.4 K/CU MM
LYMPHOCYTES RELATIVE PERCENT: 18.3 % (ref 24–44)
MCH RBC QN AUTO: 31.2 PG (ref 27–31)
MCHC RBC AUTO-ENTMCNC: 31.7 % (ref 32–36)
MCV RBC AUTO: 98.5 FL (ref 78–100)
MONOCYTES ABSOLUTE: 0.6 K/CU MM
MONOCYTES RELATIVE PERCENT: 8.1 % (ref 0–4)
NITRITE URINE, QUANTITATIVE: NEGATIVE
NUCLEATED RBC %: 0 %
OPIATES, URINE: NEGATIVE
OXYCODONE: NEGATIVE
PDW BLD-RTO: 12.6 % (ref 11.7–14.9)
PH, URINE: 5.5 (ref 5–8)
PLATELET # BLD: 247 K/CU MM (ref 140–440)
PMV BLD AUTO: 9.4 FL (ref 7.5–11.1)
POTASSIUM SERPL-SCNC: 4 MMOL/L (ref 3.5–5.1)
PRO-BNP: 90.23 PG/ML
PROTEIN UA: NEGATIVE MG/DL
RBC # BLD: 3.94 M/CU MM (ref 4.6–6.2)
SEGMENTED NEUTROPHILS ABSOLUTE COUNT: 4.9 K/CU MM
SEGMENTED NEUTROPHILS RELATIVE PERCENT: 66 % (ref 36–66)
SODIUM BLD-SCNC: 139 MMOL/L (ref 135–145)
SPECIFIC GRAVITY UA: 1.01 (ref 1–1.03)
TOTAL IMMATURE NEUTOROPHIL: 0.02 K/CU MM
TOTAL NUCLEATED RBC: 0 K/CU MM
TOTAL PROTEIN: 6.8 GM/DL (ref 6.4–8.2)
TROPONIN T: <0.01 NG/ML
UROBILINOGEN, URINE: 0.2 MG/DL (ref 0.2–1)
WBC # BLD: 7.4 K/CU MM (ref 4–10.5)

## 2023-09-13 PROCEDURE — 99285 EMERGENCY DEPT VISIT HI MDM: CPT

## 2023-09-13 PROCEDURE — 96374 THER/PROPH/DIAG INJ IV PUSH: CPT

## 2023-09-13 PROCEDURE — 6370000000 HC RX 637 (ALT 250 FOR IP): Performed by: EMERGENCY MEDICINE

## 2023-09-13 PROCEDURE — 85025 COMPLETE CBC W/AUTO DIFF WBC: CPT

## 2023-09-13 PROCEDURE — 2580000003 HC RX 258: Performed by: EMERGENCY MEDICINE

## 2023-09-13 PROCEDURE — 93010 ELECTROCARDIOGRAM REPORT: CPT | Performed by: INTERNAL MEDICINE

## 2023-09-13 PROCEDURE — 93005 ELECTROCARDIOGRAM TRACING: CPT | Performed by: EMERGENCY MEDICINE

## 2023-09-13 PROCEDURE — 80307 DRUG TEST PRSMV CHEM ANLYZR: CPT

## 2023-09-13 PROCEDURE — 84484 ASSAY OF TROPONIN QUANT: CPT

## 2023-09-13 PROCEDURE — 83690 ASSAY OF LIPASE: CPT

## 2023-09-13 PROCEDURE — 80053 COMPREHEN METABOLIC PANEL: CPT

## 2023-09-13 PROCEDURE — 83880 ASSAY OF NATRIURETIC PEPTIDE: CPT

## 2023-09-13 PROCEDURE — 81003 URINALYSIS AUTO W/O SCOPE: CPT

## 2023-09-13 PROCEDURE — 71045 X-RAY EXAM CHEST 1 VIEW: CPT

## 2023-09-13 PROCEDURE — 6360000002 HC RX W HCPCS: Performed by: EMERGENCY MEDICINE

## 2023-09-13 RX ORDER — 0.9 % SODIUM CHLORIDE 0.9 %
1000 INTRAVENOUS SOLUTION INTRAVENOUS ONCE
Status: COMPLETED | OUTPATIENT
Start: 2023-09-13 | End: 2023-09-13

## 2023-09-13 RX ORDER — ONDANSETRON 2 MG/ML
4 INJECTION INTRAMUSCULAR; INTRAVENOUS EVERY 30 MIN PRN
Status: DISCONTINUED | OUTPATIENT
Start: 2023-09-13 | End: 2023-09-13 | Stop reason: HOSPADM

## 2023-09-13 RX ORDER — KETOROLAC TROMETHAMINE 30 MG/ML
30 INJECTION, SOLUTION INTRAMUSCULAR; INTRAVENOUS ONCE
Status: COMPLETED | OUTPATIENT
Start: 2023-09-13 | End: 2023-09-13

## 2023-09-13 RX ORDER — AZITHROMYCIN 250 MG/1
500 TABLET, FILM COATED ORAL ONCE
Status: COMPLETED | OUTPATIENT
Start: 2023-09-13 | End: 2023-09-13

## 2023-09-13 RX ORDER — ACETAMINOPHEN 325 MG/1
650 TABLET ORAL EVERY 6 HOURS PRN
Qty: 120 TABLET | Refills: 3 | Status: SHIPPED | OUTPATIENT
Start: 2023-09-13

## 2023-09-13 RX ORDER — ASPIRIN 81 MG/1
81 TABLET, CHEWABLE ORAL DAILY
Qty: 30 TABLET | Refills: 0 | Status: SHIPPED | OUTPATIENT
Start: 2023-09-13

## 2023-09-13 RX ORDER — NAPROXEN 500 MG/1
500 TABLET ORAL 2 TIMES DAILY
Qty: 60 TABLET | Refills: 0 | Status: SHIPPED | OUTPATIENT
Start: 2023-09-13

## 2023-09-13 RX ORDER — ACETAMINOPHEN 500 MG
1000 TABLET ORAL ONCE
Status: COMPLETED | OUTPATIENT
Start: 2023-09-13 | End: 2023-09-13

## 2023-09-13 RX ORDER — ASPIRIN 81 MG/1
324 TABLET, CHEWABLE ORAL ONCE
Status: COMPLETED | OUTPATIENT
Start: 2023-09-13 | End: 2023-09-13

## 2023-09-13 RX ORDER — BENZONATATE 100 MG/1
100 CAPSULE ORAL 3 TIMES DAILY PRN
Qty: 10 CAPSULE | Refills: 0 | Status: SHIPPED | OUTPATIENT
Start: 2023-09-13 | End: 2023-09-20

## 2023-09-13 RX ORDER — GUAIFENESIN/DEXTROMETHORPHAN 100-10MG/5
5 SYRUP ORAL 4 TIMES DAILY PRN
Qty: 120 ML | Refills: 0 | Status: SHIPPED | OUTPATIENT
Start: 2023-09-13 | End: 2023-09-23

## 2023-09-13 RX ORDER — AZITHROMYCIN 250 MG/1
250 TABLET, FILM COATED ORAL DAILY
Qty: 4 TABLET | Refills: 0 | Status: SHIPPED | OUTPATIENT
Start: 2023-09-14 | End: 2023-09-18

## 2023-09-13 RX ADMIN — KETOROLAC TROMETHAMINE 30 MG: 30 INJECTION, SOLUTION INTRAMUSCULAR at 06:18

## 2023-09-13 RX ADMIN — AZITHROMYCIN MONOHYDRATE 500 MG: 250 TABLET ORAL at 08:23

## 2023-09-13 RX ADMIN — SODIUM CHLORIDE 1000 ML: 9 INJECTION, SOLUTION INTRAVENOUS at 06:19

## 2023-09-13 RX ADMIN — ACETAMINOPHEN 1000 MG: 500 TABLET ORAL at 06:18

## 2023-09-13 RX ADMIN — ASPIRIN 81 MG CHEWABLE TABLET 324 MG: 81 TABLET CHEWABLE at 06:18

## 2023-09-13 ASSESSMENT — ENCOUNTER SYMPTOMS
NAUSEA: 1
VOMITING: 1
SHORTNESS OF BREATH: 1
ALLERGIC/IMMUNOLOGIC NEGATIVE: 1
ABDOMINAL PAIN: 1
EYES NEGATIVE: 1

## 2023-09-13 ASSESSMENT — PAIN DESCRIPTION - LOCATION: LOCATION: CHEST;ABDOMEN

## 2023-09-13 ASSESSMENT — PAIN SCALES - GENERAL: PAINLEVEL_OUTOF10: 10

## 2023-09-13 ASSESSMENT — PAIN DESCRIPTION - ORIENTATION: ORIENTATION: MID

## 2023-09-13 NOTE — ED PROVIDER NOTES
[facility]      TRIAGE CHIEF COMPLAINT:   Chest Pain (Started tonight)      [place]:  Elfida Spurling is a 26 y.o. male that presents with complaint of continued chest pain abdominal pain nausea vomiting. Patient was seen here last night for the same thing apparently had a normal CT scan except for some rectal wall thickening but had a negative work-up discharge she came back in today few hours later with complaint of chest pain that started hour ago. Gas continued abdominal pain nausea vomiting. History of bipolar disorder. Currently sleeping on arrival he is arousable denies other questions or concerns denies any drug use or urine complaints has had a slight cough. No other questions or concerns. Denies history of DVT PE or AAA or ACS. REVIEW OF SYSTEMS:  At least 10 systems reviewed and otherwise acutely negative except as in the [address] Review of Systems   Constitutional:  Positive for fatigue. HENT: Negative. Eyes: Negative. Respiratory:  Positive for shortness of breath. Cardiovascular:  Positive for chest pain. Gastrointestinal:  Positive for abdominal pain, nausea and vomiting. Endocrine: Negative. Genitourinary: Negative. Musculoskeletal: Negative. Skin: Negative. Allergic/Immunologic: Negative. Neurological: Negative. Hematological: Negative. Psychiatric/Behavioral: Negative. All other systems reviewed and are negative.       Past Medical History:   Diagnosis Date    Bipolar 1 disorder (HCC)     Chronic generalized abdominal pain     Depression     GERD (gastroesophageal reflux disease)     IBS (irritable bowel syndrome)     Mental impairment     OCD (obsessive compulsive disorder)     Schizo affective schizophrenia ([address])      Past Surgical History:   Procedure Laterality Date    COLONOSCOPY      does not know    EGD      does not know     Family History   Problem Relation Age of Onset    Colon Cancer Neg Hx     Esophageal Cancer Neg Liver: Normal. Gallbladder and Bile Ducts: Normal. Spleen: Normal. Adrenal Glands: Normal. Pancreas: Normal. Genitourinary: No acute abnormality. No urinary stones or hydronephrosis. Bowel: Concentric rectal wall thickening is similar to the prior studies. Normal caliber bowel. No evidence of acute appendicitis. No significant diverticular disease. Vasculature: Normal. Bones and Soft Tissues: No acute abnormality. Retroperitoneum/Mesentery: No intraperitoneal free air, ascites or fluid collection. No lymphadenopathy in the abdomen or pelvis. 1.  Concentric rectal wall thickening is similar to the prior studies and may relate to underlying infectious/inflammatory process versus patient's normal anatomy. 2.  No obstructive uropathy. 3.  No bowel obstruction. EKG (if obtained): (All EKG's are interpreted by myself in the absence of a cardiologist)    12 lead EKG per my interpretation:  Sinus Bradycardia 52  Axis is   Normal  QTc is   424  There is no specific T wave changes appreciated. There is no specific ST wave changes appreciated. Prior EKG to compare with was not available       MDM:    Patient with complaint of continued chest pain abdominal pain nausea vomiting. Again patient was seen here last night had a negative work-up except for some rectal wall thickening discharged home came back and by EMS a few hours later with chest pain complaint that started an hour ago. He has a history of bipolar disorder he states he did not take any medicine for this he has no fevers he has had a slight cough no urine complaints other bowel complaints. He is currently sleeping but arousable he states he denies any drug use, no history of DVT.   AAA on exam he appears otherwise well again sleeping arousable vital signs are stable he does have chest wall pain on palpation that does reproduce his symptoms he does have generalized abdominal pain abdomen soft no pulsatile mass no hernia he has good pulses good

## 2023-09-13 NOTE — ED PROVIDER NOTES
Emergency Department Encounter  Location: [address] [place] [address]    Patient: Carly Workman  MRN: [id number]  : 1990  Date of evaluation: 2023  ED Provider: Gayla Araujo MD    1380:  Carly Workman was checked out to me by Dr. Tamiko Parada. Please see his/her initial documentation for details of the patient's initial ED presentation, physical exam and completed studies. In brief, Carly Workman is a 26 y.o. male that presented to the emergency department with abd pain. I have reviewed and interpreted all of the currently available lab results and diagnostics from this visit:  Results for orders placed or performed during the hospital encounter of 23   Urinalysis   Result Value Ref Range    Color, UA YELLOW YELLOW    Clarity, UA CLEAR CLEAR    Glucose, Urine NEGATIVE NEGATIVE MG/DL    Bilirubin Urine NEGATIVE NEGATIVE MG/DL    Ketones, Urine NEGATIVE NEGATIVE MG/DL    Specific Gravity, UA 1.025 1.001 - 1.035    Blood, Urine NEGATIVE NEGATIVE    pH, Urine 6.0 5.0 - 8.0    Protein, UA NEGATIVE NEGATIVE MG/DL    Urobilinogen, Urine 1.0 0.2 - 1.0 MG/DL    Nitrite Urine, Quantitative NEGATIVE NEGATIVE    Leukocyte Esterase, Urine NEGATIVE NEGATIVE    Urinalysis Comments       Microscopic exam not performed based on chemical results.    CBC with Auto Differential   Result Value Ref Range    WBC 6.4 4.0 - 10.5 K/CU MM    RBC 3.89 (L) 4.6 - 6.2 M/CU MM    Hemoglobin 12.0 (L) 13.5 - 18.0 GM/DL    Hematocrit 37.0 (L) 42 - 52 %    MCV 95.1 78 - 100 FL    MCH 30.8 27 - 31 PG    MCHC 32.4 32.0 - 36.0 %    RDW 12.5 11.7 - 14.9 %    Platelets 725 052 - 373 K/CU MM    MPV 9.7 7.5 - 11.1 FL    Differential Type AUTOMATED DIFFERENTIAL     Segs Relative 49.7 36 - 66 %    Lymphocytes % 29.7 24 - 44 %    Monocytes % 9.6 (H) 0 - 4 %    Eosinophils % 10.2 (H) 0 - 3 %    Basophils % 0.5 0 - 1 %    Segs Absolute 3.2 K/CU MM    Lymphocytes Absolute 1.9 K/CU MM    Monocytes

## 2023-09-16 ENCOUNTER — HOSPITAL ENCOUNTER (EMERGENCY)
Age: 33
Discharge: HOME | End: 2023-09-16
Payer: MEDICARE

## 2023-09-16 VITALS
DIASTOLIC BLOOD PRESSURE: 78 MMHG | RESPIRATION RATE: 16 BRPM | HEART RATE: 79 BPM | TEMPERATURE: 97.4 F | HEIGHT: 63 IN | SYSTOLIC BLOOD PRESSURE: 118 MMHG | WEIGHT: 114 LBS | BODY MASS INDEX: 20.2 KG/M2 | OXYGEN SATURATION: 100 %

## 2023-09-16 DIAGNOSIS — R10.9 ABDOMINAL PAIN, UNSPECIFIED ABDOMINAL LOCATION: Primary | ICD-10-CM

## 2023-09-16 DIAGNOSIS — Z59.00 HOMELESSNESS: ICD-10-CM

## 2023-09-16 PROCEDURE — 6360000002 HC RX W HCPCS: Performed by: NURSE PRACTITIONER

## 2023-09-16 PROCEDURE — 99284 EMERGENCY DEPT VISIT MOD MDM: CPT

## 2023-09-16 PROCEDURE — 96372 THER/PROPH/DIAG INJ SC/IM: CPT

## 2023-09-16 RX ORDER — DICYCLOMINE HYDROCHLORIDE 10 MG/ML
20 INJECTION INTRAMUSCULAR ONCE
Status: COMPLETED | OUTPATIENT
Start: 2023-09-16 | End: 2023-09-16

## 2023-09-16 RX ORDER — KETOROLAC TROMETHAMINE 30 MG/ML
30 INJECTION, SOLUTION INTRAMUSCULAR; INTRAVENOUS ONCE
Status: COMPLETED | OUTPATIENT
Start: 2023-09-16 | End: 2023-09-16

## 2023-09-16 RX ORDER — DICYCLOMINE HYDROCHLORIDE 10 MG/1
10 CAPSULE ORAL 4 TIMES DAILY
Qty: 40 CAPSULE | Refills: 0 | Status: SHIPPED | OUTPATIENT
Start: 2023-09-16 | End: 2023-09-26

## 2023-09-16 RX ADMIN — DICYCLOMINE HYDROCHLORIDE 20 MG: 20 INJECTION, SOLUTION INTRAMUSCULAR at 08:11

## 2023-09-16 RX ADMIN — KETOROLAC TROMETHAMINE 30 MG: 30 INJECTION, SOLUTION INTRAMUSCULAR; INTRAVENOUS at 08:10

## 2023-09-16 SDOH — ECONOMIC STABILITY - HOUSING INSECURITY: HOMELESSNESS UNSPECIFIED: Z59.00

## 2023-09-16 ASSESSMENT — PAIN SCALES - GENERAL
PAINLEVEL_OUTOF10: 10
PAINLEVEL_OUTOF10: 10

## 2023-09-16 ASSESSMENT — PAIN DESCRIPTION - LOCATION: LOCATION: ABDOMEN

## 2023-09-16 ASSESSMENT — PAIN - FUNCTIONAL ASSESSMENT: PAIN_FUNCTIONAL_ASSESSMENT: 0-10

## 2023-09-16 NOTE — ED TRIAGE NOTES
Pt to ED with c/o abdominal pain. Pt states, \"the shelter is neglecting me, there isn't enough food. \"

## 2023-09-16 NOTE — ED PROVIDER NOTES
[address] [facility] ENCOUNTER        Pt Name: Ximena Berry  MRN: [id number]  [address] [facility] [place] 1990  Date of evaluation: 9/16/2023  Provider: ROSE George CNP  PCP: No primary care provider on file. TALA. I have evaluated this patient. Triage CHIEF COMPLAINT       Chief Complaint   Patient presents with    Abdominal Pain     Pt states that it is a constant mid lower pain since last night. Pt states he was constipated this morning and thinks it is gas. HISTORY OF PRESENT ILLNESS      Chief Complaint: abdominal pain, \"I am hungry\"    Ximena Berry is a 26 y.o. male who presents for evaluation of some mild lower abdominal cramping that is occurred overnight which he is associating with gas pain. He has been passing gas. He did have a soft bowel movement this morning. He denies any nausea, vomiting. Patient also readily discloses that he is living in a shelter nearby and they have not had enough food to eat and he is hungry. He has chronic abdominal issues and reports that he is supposed to be seeing a belly surgeon but he has not made an appointment for follow-up yet. He is not taking any medication for management of his symptoms at home. Nursing Notes were all reviewed and agreed with or any disagreements were addressed in the HPI. REVIEW OF SYSTEMS     Pertinent ROS as noted in HPI.       PAST MEDICAL HISTORY     Past Medical History:   Diagnosis Date    Bipolar 1 disorder ([address] )     Chronic generalized abdominal pain     Depression     GERD (gastroesophageal reflux disease)     IBS (irritable bowel syndrome)     Mental impairment     OCD (obsessive compulsive disorder)     Schizo affective schizophrenia ([address] [facility])        SURGICAL HISTORY     Past Surgical History:   Procedure Laterality Date    COLONOSCOPY      does not know    EGD      does not know        [facility]       Current Discharge however, unfortunately we do not have case management available at the time of the patient visit. Patient referred to GI and encouraged to follow up for evaluation of his chronic abdominal pain. I am the Primary Clinician of Record. CLINICAL IMPRESSION      1. Abdominal pain, unspecified abdominal location    2.  Homelessness          DISPOSITION/PLAN   DISPOSITION Decision To Discharge 09/16/2023 08:03:31 AM      PATIENT REFERREDTO:  Linh Sharma MD  [address]  [phone]    Schedule an appointment as soon as possible for a visit   for further evaluation of your ongoing abdominal pain      DISCHARGE MEDICATIONS:  Current Discharge Medication List          DISCONTINUED MEDICATIONS:  Current Discharge Medication List                 (Please note that portions ofthis note were completed with a voice recognition program.  Efforts were made to edit the dictations but occasionally words are mis-transcribed.)    ROSE Christensen CNP (electronically signed)             ROSE Christensen CNP  09/16/23 9697

## 2023-09-20 ENCOUNTER — HOSPITAL ENCOUNTER (EMERGENCY)
Age: 33
Discharge: LEFT BEFORE DISCHARGE | End: 2023-09-20
Attending: EMERGENCY MEDICINE
Payer: MEDICARE

## 2023-09-20 ENCOUNTER — APPOINTMENT (OUTPATIENT)
Dept: GENERAL RADIOLOGY | Age: 33
End: 2023-09-20
Payer: MEDICARE

## 2023-09-20 VITALS
DIASTOLIC BLOOD PRESSURE: 89 MMHG | OXYGEN SATURATION: 97 % | SYSTOLIC BLOOD PRESSURE: 110 MMHG | RESPIRATION RATE: 20 BRPM | TEMPERATURE: 98.5 F | HEART RATE: 97 BPM

## 2023-09-20 DIAGNOSIS — R07.9 CHEST PAIN, UNSPECIFIED TYPE: ICD-10-CM

## 2023-09-20 DIAGNOSIS — G89.29 CHRONIC ABDOMINAL PAIN: ICD-10-CM

## 2023-09-20 DIAGNOSIS — R10.9 CHRONIC ABDOMINAL PAIN: ICD-10-CM

## 2023-09-20 DIAGNOSIS — H92.09 OTALGIA, UNSPECIFIED LATERALITY: Primary | ICD-10-CM

## 2023-09-20 LAB
ALBUMIN SERPL-MCNC: 4.8 GM/DL (ref 3.4–5)
ALP BLD-CCNC: 82 IU/L (ref 40–129)
ALT SERPL-CCNC: 17 U/L (ref 10–40)
ANION GAP SERPL CALCULATED.3IONS-SCNC: 8 MMOL/L (ref 4–16)
AST SERPL-CCNC: 19 IU/L (ref 15–37)
BASOPHILS ABSOLUTE: 0 K/CU MM
BASOPHILS RELATIVE PERCENT: 0.3 % (ref 0–1)
BILIRUB SERPL-MCNC: 0.1 MG/DL (ref 0–1)
BUN SERPL-MCNC: 20 MG/DL (ref 6–23)
CALCIUM SERPL-MCNC: 9.6 MG/DL (ref 8.3–10.6)
CHLORIDE BLD-SCNC: 104 MMOL/L (ref 99–110)
CO2: 31 MMOL/L (ref 21–32)
CREAT SERPL-MCNC: 1 MG/DL (ref 0.9–1.3)
DIFFERENTIAL TYPE: ABNORMAL
EOSINOPHILS ABSOLUTE: 0.3 K/CU MM
EOSINOPHILS RELATIVE PERCENT: 3.6 % (ref 0–3)
GFR SERPL CREATININE-BSD FRML MDRD: >60 ML/MIN/1.73M2
GLUCOSE SERPL-MCNC: 88 MG/DL (ref 70–99)
HCT VFR BLD CALC: 40.8 % (ref 42–52)
HEMOGLOBIN: 12.8 GM/DL (ref 13.5–18)
IMMATURE NEUTROPHIL %: 0.4 % (ref 0–0.43)
LYMPHOCYTES ABSOLUTE: 1.7 K/CU MM
LYMPHOCYTES RELATIVE PERCENT: 25.3 % (ref 24–44)
MCH RBC QN AUTO: 30.9 PG (ref 27–31)
MCHC RBC AUTO-ENTMCNC: 31.4 % (ref 32–36)
MCV RBC AUTO: 98.6 FL (ref 78–100)
MONOCYTES ABSOLUTE: 0.7 K/CU MM
MONOCYTES RELATIVE PERCENT: 9.6 % (ref 0–4)
NUCLEATED RBC %: 0 %
PDW BLD-RTO: 12.7 % (ref 11.7–14.9)
PLATELET # BLD: 260 K/CU MM (ref 140–440)
PMV BLD AUTO: 9.7 FL (ref 7.5–11.1)
POTASSIUM SERPL-SCNC: 4.5 MMOL/L (ref 3.5–5.1)
RBC # BLD: 4.14 M/CU MM (ref 4.6–6.2)
SEGMENTED NEUTROPHILS ABSOLUTE COUNT: 4.2 K/CU MM
SEGMENTED NEUTROPHILS RELATIVE PERCENT: 60.8 % (ref 36–66)
SODIUM BLD-SCNC: 143 MMOL/L (ref 135–145)
TOTAL IMMATURE NEUTOROPHIL: 0.03 K/CU MM
TOTAL NUCLEATED RBC: 0 K/CU MM
TOTAL PROTEIN: 7.2 GM/DL (ref 6.4–8.2)
TROPONIN T: <0.01 NG/ML
WBC # BLD: 6.9 K/CU MM (ref 4–10.5)

## 2023-09-20 PROCEDURE — 96372 THER/PROPH/DIAG INJ SC/IM: CPT

## 2023-09-20 PROCEDURE — 6360000002 HC RX W HCPCS: Performed by: PHYSICIAN ASSISTANT

## 2023-09-20 PROCEDURE — 85025 COMPLETE CBC W/AUTO DIFF WBC: CPT

## 2023-09-20 PROCEDURE — 84484 ASSAY OF TROPONIN QUANT: CPT

## 2023-09-20 PROCEDURE — 6370000000 HC RX 637 (ALT 250 FOR IP): Performed by: PHYSICIAN ASSISTANT

## 2023-09-20 PROCEDURE — 80053 COMPREHEN METABOLIC PANEL: CPT

## 2023-09-20 PROCEDURE — 99285 EMERGENCY DEPT VISIT HI MDM: CPT

## 2023-09-20 PROCEDURE — 71045 X-RAY EXAM CHEST 1 VIEW: CPT

## 2023-09-20 PROCEDURE — 93005 ELECTROCARDIOGRAM TRACING: CPT | Performed by: PHYSICIAN ASSISTANT

## 2023-09-20 RX ORDER — DICYCLOMINE HCL 20 MG
20 TABLET ORAL ONCE
Status: COMPLETED | OUTPATIENT
Start: 2023-09-20 | End: 2023-09-20

## 2023-09-20 RX ORDER — KETOROLAC TROMETHAMINE 30 MG/ML
15 INJECTION, SOLUTION INTRAMUSCULAR; INTRAVENOUS ONCE
Status: COMPLETED | OUTPATIENT
Start: 2023-09-20 | End: 2023-09-20

## 2023-09-20 RX ORDER — ONDANSETRON 4 MG/1
4 TABLET, ORALLY DISINTEGRATING ORAL ONCE
Status: COMPLETED | OUTPATIENT
Start: 2023-09-20 | End: 2023-09-20

## 2023-09-20 RX ORDER — ASPIRIN 325 MG
325 TABLET ORAL ONCE
Status: COMPLETED | OUTPATIENT
Start: 2023-09-20 | End: 2023-09-20

## 2023-09-20 RX ADMIN — ASPIRIN 325 MG: 325 TABLET ORAL at 17:09

## 2023-09-20 RX ADMIN — Medication 5 DROP: at 17:09

## 2023-09-20 RX ADMIN — ONDANSETRON 4 MG: 4 TABLET, ORALLY DISINTEGRATING ORAL at 17:10

## 2023-09-20 RX ADMIN — KETOROLAC TROMETHAMINE 15 MG: 30 INJECTION, SOLUTION INTRAMUSCULAR; INTRAVENOUS at 17:10

## 2023-09-20 RX ADMIN — DICYCLOMINE HYDROCHLORIDE 20 MG: 20 TABLET ORAL at 17:10

## 2023-09-20 NOTE — ED PROVIDER NOTES
AMADA Lozada. MANI am the primary physician of record, and independently examined and evaluated [address] [facility]. In brief their history revealed a 26 y.o.  male who presents to the emergency department today via private vehicle complaining of ear fullness, chest pain, abdominal pain. He is well-known to the emergency department and surrounding ED's, he has had numerous visits/frequent utilizer of the ED over the last month or so. Complaining of continued ear fullness, believe that the ear is full of wax, he is also having component of his acute on chronic chest pain and abdominal pain. He is requesting a shot of Toradol, Bentyl and things for his nausea. He states that the chest pain is generalized throughout his chest.  Not worsening shortness of breath. No palpitations. Patient appears to be malingering, did request case management consultations    Their focused exam revealed alert oriented male resting in bed no distress no cephalic atraumatic sclera clear airway normal lungs clear heart regular rhythm 2+ pulses throughout abdomen soft nontender diffusely no rebound guarding rigidity bowel sounds present. We will extremities left TM has wax in the canal cranial nerves intact. ED course: Patient seen with PA please see his note. Patient with complaint of left ear pain and some abdominal pain. Patient is here frequently he does have a history of mental handicap, he has chronic abdominal pain I saw him recently for the same thing CT scan was normal except for some rectal inflammation which has had before. He does complain of left ear pain today he did not mention chest pain to me he mentioned chest pain to the PA. Left TM has earwax in the canal he has no fever no headache no chest pain on my exam or shortness of breath abdomen soft mildly tender diffusely no hernia no pulsatile mass no rebound guarding rigidity we will check basic lab work, give medication.   I do not think he needs imaging

## 2023-09-20 NOTE — CARE COORDINATION
Registration stated that Room 6 patient requested to speak to the [facility]. \" CM went to patients' room. Patient stated that he wanted this CM to document that he did not have Mental Health Problems and that he was there for being sick. Patient then requested this CM to get him a ride because when he is finished he would CM to call his insurance company to set up transportation to another hospital because he doesn't like this hospital or anyone here. CM then asked patient why he came to hospital if he was not wanting to stay. Patient reported that he was going to stay, but still wanted a ride to another hospital when discharged. Dr. Lorin Riedel entered room. Patient started saying things about the [employer] and that patient works for [employer] etc. CM spoke to [place] and understood that patient was at baseline for behaviorally. CM said to patient that patient would be seen by physician and that CM would assist patient with getting a ride but encouraged patient to stay until patient is discharged to make sure that patient is medically cleared. CM did give patient Book of Resources for [employer], Clothing, Housing, Transportation and other resources for [employer]. 18:30 Patient was discharged and asked this CM if CM noted in his chart that patient was not here for Mental Health reasons. Patient apologized and said that he didn't mean to be hateful towards us. . he just wanted to make sure that the government wasn't following him because he has rights and HIPAA and all. \" CM confirmed that she had noted that he was not here for MH reasons. Patient needing a ride home back to [address] [place] [address] in [place], [place]. CM called [employer]. Invoice completed. CM provided patient with a box lunch and drink.

## 2023-09-20 NOTE — ED PROVIDER NOTES
EMERGENCY DEPARTMENT ENCOUNTER        Pt Name: Marla Wilson  MRN: [id number]  [address] 1990  Date of evaluation: 9/20/2023  Provider: WENDY Brown  PCP: No primary care provider on file. I have seen and evaluated this patient with my supervising physician Amanda Hernandez DO. Triage CHIEF COMPLAINT       Chief Complaint   Patient presents with    Otalgia         HISTORY OF PRESENT ILLNESS      Chief Complaint: Chest pain, abdominal pain, ear fullness    Marla Wilson is a 26 y.o.  male who presents to the emergency department today via private vehicle complaining of ear fullness, chest pain, abdominal pain. He is well-known to the emergency department and surrounding ED's, he has had numerous visits/frequent utilizer of the ED over the last month or so. Complaining of continued ear fullness, believe that the ear is full of wax, he is also having component of his acute on chronic chest pain and abdominal pain. He is requesting a shot of Toradol, Bentyl and things for his nausea. He states that the chest pain is generalized throughout his chest.  Not worsening shortness of breath. No palpitations. Patient appears to be malingering, did request case management consultations    Nursing Notes were all reviewed and agreed with or any disagreements were addressed in the HPI. REVIEW OF SYSTEMS     At least 10 systems reviewed and otherwise acutely negative except as in the [address] [facility] [address]      PAST MEDICAL HISTORY     Past Medical History:   Diagnosis Date    Bipolar 1 disorder ([address] [facility])     Chronic generalized abdominal pain     Depression     GERD (gastroesophageal reflux disease)     IBS (irritable bowel syndrome)     Mental impairment     OCD (obsessive compulsive disorder)     Schizo affective schizophrenia ([address] [facility])        SURGICAL HISTORY     Past Surgical History:   Procedure Laterality Date    COLONOSCOPY      does not know    EGD      does not know       Rivera Melo

## 2023-09-22 LAB
EKG ATRIAL RATE: 98 BPM
EKG DIAGNOSIS: NORMAL
EKG P AXIS: 42 DEGREES
EKG P-R INTERVAL: 110 MS
EKG Q-T INTERVAL: 344 MS
EKG QRS DURATION: 80 MS
EKG QTC CALCULATION (BAZETT): 439 MS
EKG R AXIS: 29 DEGREES
EKG T AXIS: 36 DEGREES
EKG VENTRICULAR RATE: 98 BPM

## 2023-09-22 PROCEDURE — 93010 ELECTROCARDIOGRAM REPORT: CPT | Performed by: INTERNAL MEDICINE

## 2023-12-24 ENCOUNTER — HOSPITAL ENCOUNTER (EMERGENCY)
Age: 33
Discharge: HOME | End: 2023-12-25
Attending: EMERGENCY MEDICINE
Payer: MEDICARE

## 2023-12-24 VITALS
RESPIRATION RATE: 16 BRPM | TEMPERATURE: 97.8 F | DIASTOLIC BLOOD PRESSURE: 90 MMHG | SYSTOLIC BLOOD PRESSURE: 126 MMHG | HEART RATE: 116 BPM | OXYGEN SATURATION: 94 %

## 2023-12-24 DIAGNOSIS — R10.9 ABDOMINAL PAIN, UNSPECIFIED ABDOMINAL LOCATION: ICD-10-CM

## 2023-12-24 DIAGNOSIS — F22 PARANOIA (HCC): Primary | ICD-10-CM

## 2023-12-24 LAB
ALBUMIN SERPL-MCNC: 4.2 GM/DL (ref 3.4–5)
ALCOHOL SCREEN SERUM: <0.01 %WT/VOL
ALP BLD-CCNC: 72 IU/L (ref 40–129)
ALT SERPL-CCNC: 16 U/L (ref 10–40)
AMPHETAMINES: NEGATIVE
ANION GAP SERPL CALCULATED.3IONS-SCNC: 11 MMOL/L (ref 7–16)
AST SERPL-CCNC: 18 IU/L (ref 15–37)
BARBITURATE SCREEN URINE: NEGATIVE
BASOPHILS ABSOLUTE: 0 K/CU MM
BASOPHILS RELATIVE PERCENT: 0.3 % (ref 0–1)
BENZODIAZEPINE SCREEN, URINE: NEGATIVE
BILIRUB SERPL-MCNC: 0.1 MG/DL (ref 0–1)
BUN SERPL-MCNC: 18 MG/DL (ref 6–23)
CALCIUM SERPL-MCNC: 8.8 MG/DL (ref 8.3–10.6)
CANNABINOID SCREEN URINE: NEGATIVE
CHLORIDE BLD-SCNC: 102 MMOL/L (ref 99–110)
CO2: 27 MMOL/L (ref 21–32)
COCAINE METABOLITE: NEGATIVE
CREAT SERPL-MCNC: 0.9 MG/DL (ref 0.9–1.3)
DIFFERENTIAL TYPE: ABNORMAL
EOSINOPHILS ABSOLUTE: 0.2 K/CU MM
EOSINOPHILS RELATIVE PERCENT: 3 % (ref 0–3)
FENTANYL URINE: NEGATIVE
GFR SERPL CREATININE-BSD FRML MDRD: >60 ML/MIN/1.73M2
GLUCOSE SERPL-MCNC: 89 MG/DL (ref 70–99)
HCT VFR BLD CALC: 42.1 % (ref 42–52)
HEMOGLOBIN: 13.2 GM/DL (ref 13.5–18)
IMMATURE NEUTROPHIL %: 0.3 % (ref 0–0.43)
LYMPHOCYTES ABSOLUTE: 1.6 K/CU MM
LYMPHOCYTES RELATIVE PERCENT: 26.5 % (ref 24–44)
MCH RBC QN AUTO: 31 PG (ref 27–31)
MCHC RBC AUTO-ENTMCNC: 31.4 % (ref 32–36)
MCV RBC AUTO: 98.8 FL (ref 78–100)
MONOCYTES ABSOLUTE: 0.5 K/CU MM
MONOCYTES RELATIVE PERCENT: 8.8 % (ref 0–4)
NUCLEATED RBC %: 0 %
OPIATES, URINE: NEGATIVE
OXYCODONE: NEGATIVE
PDW BLD-RTO: 11.9 % (ref 11.7–14.9)
PLATELET # BLD: 256 K/CU MM (ref 140–440)
PMV BLD AUTO: 9.5 FL (ref 7.5–11.1)
POTASSIUM SERPL-SCNC: 4.3 MMOL/L (ref 3.5–5.1)
RBC # BLD: 4.26 M/CU MM (ref 4.6–6.2)
SEGMENTED NEUTROPHILS ABSOLUTE COUNT: 3.6 K/CU MM
SEGMENTED NEUTROPHILS RELATIVE PERCENT: 61.1 % (ref 36–66)
SODIUM BLD-SCNC: 140 MMOL/L (ref 135–145)
TOTAL IMMATURE NEUTOROPHIL: 0.02 K/CU MM
TOTAL NUCLEATED RBC: 0 K/CU MM
TOTAL PROTEIN: 7.2 GM/DL (ref 6.4–8.2)
WBC # BLD: 5.9 K/CU MM (ref 4–10.5)

## 2023-12-24 PROCEDURE — 80307 DRUG TEST PRSMV CHEM ANLYZR: CPT

## 2023-12-24 PROCEDURE — 80053 COMPREHEN METABOLIC PANEL: CPT

## 2023-12-24 PROCEDURE — 96372 THER/PROPH/DIAG INJ SC/IM: CPT

## 2023-12-24 PROCEDURE — 99285 EMERGENCY DEPT VISIT HI MDM: CPT

## 2023-12-24 PROCEDURE — G0480 DRUG TEST DEF 1-7 CLASSES: HCPCS

## 2023-12-24 PROCEDURE — 85025 COMPLETE CBC W/AUTO DIFF WBC: CPT

## 2023-12-24 PROCEDURE — 6360000002 HC RX W HCPCS: Performed by: EMERGENCY MEDICINE

## 2023-12-24 RX ORDER — DIPHENHYDRAMINE HYDROCHLORIDE 50 MG/ML
25 INJECTION INTRAMUSCULAR; INTRAVENOUS ONCE
Status: DISCONTINUED | OUTPATIENT
Start: 2023-12-24 | End: 2023-12-24

## 2023-12-24 RX ORDER — LORAZEPAM 2 MG/ML
2 INJECTION INTRAMUSCULAR ONCE
Status: COMPLETED | OUTPATIENT
Start: 2023-12-24 | End: 2023-12-24

## 2023-12-24 RX ORDER — DIPHENHYDRAMINE HYDROCHLORIDE 50 MG/ML
25 INJECTION INTRAMUSCULAR; INTRAVENOUS ONCE
Status: COMPLETED | OUTPATIENT
Start: 2023-12-24 | End: 2023-12-24

## 2023-12-24 RX ORDER — HALOPERIDOL 5 MG/ML
5 INJECTION INTRAMUSCULAR ONCE
Status: COMPLETED | OUTPATIENT
Start: 2023-12-24 | End: 2023-12-24

## 2023-12-24 RX ORDER — LORAZEPAM 2 MG/ML
2 INJECTION INTRAMUSCULAR ONCE
Status: DISCONTINUED | OUTPATIENT
Start: 2023-12-24 | End: 2023-12-24

## 2023-12-24 RX ADMIN — HALOPERIDOL LACTATE 5 MG: 5 INJECTION, SOLUTION INTRAMUSCULAR at 15:55

## 2023-12-24 RX ADMIN — DIPHENHYDRAMINE HYDROCHLORIDE 25 MG: 50 INJECTION, SOLUTION INTRAMUSCULAR; INTRAVENOUS at 15:55

## 2023-12-24 RX ADMIN — LORAZEPAM 2 MG: 2 INJECTION INTRAMUSCULAR; INTRAVENOUS at 15:55

## 2023-12-24 NOTE — ED PROVIDER NOTES
Emergency Department Encounter    Patient: Jayce Saunders  MRN: [id number]  : 1990  Date of Evaluation: 2023  ED Provider:  Radha Meza MD    Triage Chief Complaint:   Abdominal Pain and Mental Health Problem (Patient seems very paranoid- reports that someone from the shelter is sitting outside [facility] and \"lying\" to them. Patient keeps referencing \"people outside\" and \"they know what they are doing\". )    [place]:  Jayce Saunders is a 26 y.o. male that presents to the emergency room stating \"people at the shelter are off to me. I am telling if they were trying to shoot me. They trudi me by my left arm. You are not supposed to do that. \"  States he is homeless and he is currently staying at a local shelter. He denies suicidal homicidal thoughts or plans. He denies illicit drugs or alcohol ingestion. He also denies any psychiatric history. However, he appears paranoid with pressured speech.   His thoughts are tangential    ROS - see HPI, below listed is current ROS at time of my eval:  General:  No fevers, no chills, no weakness  Eyes:  No recent vison changes, no discharge  ENT:  No sore throat, no nasal congestion, no hearing changes  Cardiovascular:  No chest pain, no palpitations  Respiratory:  No shortness of breath, no cough, no wheezing  Gastrointestinal:  No pain, no nausea, no vomiting, no diarrhea  Musculoskeletal:  No muscle pain, no joint pain  Skin:  No rash, no pruritis, no easy bruising  Neurologic:  No speech problems, no headache, no extremity numbness, no extremity tingling, no extremity weakness  Psychiatric: Paranoid, pressured speech, tangential thoughts  Genitourinary:  No dysuria, no hematuria  Endocrine:  No unexpected weight gain, no unexpected weight loss  Extremities:  no edema, no pain    Past Medical History:   Diagnosis Date    Bipolar 1 disorder (HCC)     Chronic generalized abdominal pain     Depression     GERD (gastroesophageal reflux disease)     IBS

## 2023-12-25 LAB
ACETAMINOPHEN LEVEL: <5 UG/ML (ref 15–30)
DOSE AMOUNT: ABNORMAL
DOSE AMOUNT: ABNORMAL
DOSE TIME: ABNORMAL
DOSE TIME: ABNORMAL
SALICYLATE LEVEL: <0.3 MG/DL (ref 15–30)

## 2023-12-25 PROCEDURE — 90792 PSYCH DIAG EVAL W/MED SRVCS: CPT

## 2023-12-25 NOTE — VIRTUAL HEALTH
disease)     IBS (irritable bowel syndrome)     Mental impairment     OCD (obsessive compulsive disorder)     Schizo affective schizophrenia ([address])      Past Surgical History:        Procedure Laterality Date    COLONOSCOPY      does not know    EGD      does not know      Allergies: Allergies   Allergen Reactions    Latex     Adhesive Tape     Codeine     Iodine     Shellfish-Derived Products       Medications:  No current facility-administered medications for this encounter. Current Outpatient Medications:     dicyclomine (BENTYL) 10 MG capsule, Take 1 capsule by mouth 4 times daily for 10 days, Disp: 40 capsule, Rfl: 0    aspirin (ASPIRIN CHILDRENS) 81 MG chewable tablet, Take 1 tablet by mouth daily, Disp: 30 tablet, Rfl: 0    naproxen (NAPROSYN) 500 MG tablet, Take 1 tablet by mouth 2 times daily, Disp: 60 tablet, Rfl: 0    acetaminophen (TYLENOL) 325 MG tablet, Take 2 tablets by mouth every 6 hours as needed for Pain, Disp: 120 tablet, Rfl: 3    naproxen (NAPROSYN) 500 MG tablet, Take 1 tablet by mouth 2 times daily for 20 doses, Disp: 20 tablet, Rfl: 0    famotidine (PEPCID) 20 MG tablet, Take 1 tablet by mouth 2 times daily for 14 days, Disp: 28 tablet, Rfl: 0    ondansetron (ZOFRAN) 4 MG tablet, Take 1 tablet by mouth every 8 hours as needed for Nausea (Patient not taking: Reported on 3/13/2023), Disp: 10 tablet, Rfl: 0    aspirin 81 MG EC tablet, Take 81 mg by mouth daily (Patient not taking: Reported on 3/13/2023), Disp: , Rfl:     Fesoterodine Fumarate ER 8 MG TB24, Take 8 mg by mouth every evening (Patient not taking: Reported on 3/13/2023), Disp: , Rfl:   Not in a hospital admission. Prior to Admission medications    Medication Sig Start Date End Date Taking?  Authorizing Provider   dicyclomine (BENTYL) 10 MG capsule Take 1 capsule by mouth 4 times daily for 10 days 9/16/23 9/26/23  ROSE Hercules - CNP   aspirin (ASPIRIN CHILDRENS) 81 MG chewable tablet Take 1 tablet by mouth daily 9/13/23

## 2023-12-25 NOTE — ED PROVIDER NOTES
I took signout on the patient at shift change. Briefly, the patient comes in for paranoia, felt like people were out to get him. Denied suicidality or homicidality. He received a B-52 cocktail by the prior physician, was medically cleared and then saw the telepsych team.  During his telepsych interview, he denied any paranoia, denied any auditory or visual hallucinations. He is not suicidal, not homicidal.  They recommended discharge and follow-up outpatient. I did see and evaluate the patient myself, but to me he has linear thinking, denied any suicidality homicidality, he is not responding to internal stimuli, he seems very logical and is agreeable with discharge.   Discharge stable with return cautions     Ellyn Florian MD  12/25/23 0600

## 2023-12-27 ENCOUNTER — HOSPITAL ENCOUNTER (EMERGENCY)
Age: 33
Discharge: HOME | End: 2023-12-27
Payer: MEDICARE

## 2023-12-27 VITALS
SYSTOLIC BLOOD PRESSURE: 140 MMHG | DIASTOLIC BLOOD PRESSURE: 69 MMHG | TEMPERATURE: 98.3 F | HEIGHT: 63 IN | OXYGEN SATURATION: 97 % | RESPIRATION RATE: 18 BRPM | BODY MASS INDEX: 20.19 KG/M2 | HEART RATE: 76 BPM

## 2023-12-27 DIAGNOSIS — G89.29 CHRONIC ABDOMINAL PAIN: Primary | ICD-10-CM

## 2023-12-27 DIAGNOSIS — R10.9 CHRONIC ABDOMINAL PAIN: Primary | ICD-10-CM

## 2023-12-27 PROCEDURE — 81003 URINALYSIS AUTO W/O SCOPE: CPT

## 2023-12-27 PROCEDURE — 99284 EMERGENCY DEPT VISIT MOD MDM: CPT

## 2023-12-27 PROCEDURE — 96372 THER/PROPH/DIAG INJ SC/IM: CPT

## 2023-12-27 PROCEDURE — 6360000002 HC RX W HCPCS: Performed by: PHYSICIAN ASSISTANT

## 2023-12-27 PROCEDURE — 6370000000 HC RX 637 (ALT 250 FOR IP): Performed by: PHYSICIAN ASSISTANT

## 2023-12-27 RX ORDER — KETOROLAC TROMETHAMINE 15 MG/ML
30 INJECTION, SOLUTION INTRAMUSCULAR; INTRAVENOUS ONCE
Status: COMPLETED | OUTPATIENT
Start: 2023-12-27 | End: 2023-12-27

## 2023-12-27 RX ORDER — DICYCLOMINE HYDROCHLORIDE 10 MG/ML
20 INJECTION INTRAMUSCULAR ONCE
Status: COMPLETED | OUTPATIENT
Start: 2023-12-27 | End: 2023-12-27

## 2023-12-27 RX ORDER — ONDANSETRON 2 MG/ML
4 INJECTION INTRAMUSCULAR; INTRAVENOUS ONCE
Status: DISCONTINUED | OUTPATIENT
Start: 2023-12-27 | End: 2023-12-27

## 2023-12-27 RX ORDER — ONDANSETRON 4 MG/1
4 TABLET, ORALLY DISINTEGRATING ORAL ONCE
Status: COMPLETED | OUTPATIENT
Start: 2023-12-27 | End: 2023-12-27

## 2023-12-27 RX ADMIN — DICYCLOMINE HYDROCHLORIDE 20 MG: 10 INJECTION, SOLUTION INTRAMUSCULAR at 13:09

## 2023-12-27 RX ADMIN — KETOROLAC TROMETHAMINE 30 MG: 15 INJECTION, SOLUTION INTRAMUSCULAR; INTRAVENOUS at 13:09

## 2023-12-27 RX ADMIN — ONDANSETRON 4 MG: 4 TABLET, ORALLY DISINTEGRATING ORAL at 13:09

## 2023-12-27 NOTE — ED PROVIDER NOTES
this is all part of his malingering. He will be given a shot of IM meds, oral medication. Will be advised to tolerate p.o. He will be discharged with outpatient follow-up. History from : Patient    Limitations to history : None    Patient was given the following medications:  Medications   ketorolac (TORADOL) injection 30 mg (30 mg IntraMUSCular Given 12/27/23 1309)   dicyclomine (BENTYL) injection 20 mg (20 mg IntraMUSCular Given 12/27/23 1309)   ondansetron (ZOFRAN-ODT) disintegrating tablet 4 mg (4 mg Oral Given 12/27/23 1309)       Chronic conditions affecting care: Chronic abdominal pain, malingering    Discussion with Other Profesionals : None    Social Determinants : None    Records Reviewed : Source reviewed EMR, reviewed recent encounters, reviewed history, reviewed medication list labs imaging, notations, psychiatric evaluations. Appropriate for outpatient management      I am the Primary Clinician of Record. CLINICAL IMPRESSION      1. Chronic abdominal pain          DISPOSITION/PLAN   DISPOSITION Decision To Discharge 12/27/2023 01:04:03 PM      PATIENT REFERREDTO:  Follow up with primary care information provided            DISCHARGE MEDICATIONS:  Discharge Medication List as of 12/27/2023  1:06 PM          DISCONTINUED MEDICATIONS:  Discharge Medication List as of 12/27/2023  1:06 PM                 (Please note that portions ofthis note were completed with a voice recognition program.  Efforts were made to edit the dictations but occasionally words are mis-transcribed. )    WENDY Fraga (electronically signed)             Paula Fraga  12/27/23 2601

## 2023-12-28 ENCOUNTER — HOSPITAL ENCOUNTER (EMERGENCY)
Age: 33
Discharge: HOME | End: 2023-12-28
Attending: EMERGENCY MEDICINE
Payer: MEDICARE

## 2023-12-28 ENCOUNTER — HOSPITAL ENCOUNTER (EMERGENCY)
Age: 33
Discharge: HOME | End: 2023-12-28
Payer: MEDICARE

## 2023-12-28 VITALS
TEMPERATURE: 98 F | DIASTOLIC BLOOD PRESSURE: 87 MMHG | OXYGEN SATURATION: 96 % | HEART RATE: 97 BPM | RESPIRATION RATE: 16 BRPM | SYSTOLIC BLOOD PRESSURE: 125 MMHG

## 2023-12-28 VITALS
SYSTOLIC BLOOD PRESSURE: 111 MMHG | OXYGEN SATURATION: 96 % | RESPIRATION RATE: 18 BRPM | HEART RATE: 103 BPM | TEMPERATURE: 98 F | DIASTOLIC BLOOD PRESSURE: 72 MMHG

## 2023-12-28 DIAGNOSIS — R10.84 GENERALIZED ABDOMINAL PAIN: Primary | ICD-10-CM

## 2023-12-28 LAB
ALBUMIN SERPL-MCNC: 4.2 GM/DL (ref 3.4–5)
ALP BLD-CCNC: 74 IU/L (ref 40–128)
ALT SERPL-CCNC: 17 U/L (ref 10–40)
ANION GAP SERPL CALCULATED.3IONS-SCNC: 11 MMOL/L (ref 7–16)
AST SERPL-CCNC: 42 IU/L (ref 15–37)
BASOPHILS ABSOLUTE: 0 K/CU MM
BASOPHILS RELATIVE PERCENT: 0.1 % (ref 0–1)
BILIRUB SERPL-MCNC: 0.2 MG/DL (ref 0–1)
BUN SERPL-MCNC: 18 MG/DL (ref 6–23)
CALCIUM SERPL-MCNC: 8.9 MG/DL (ref 8.3–10.6)
CHLORIDE BLD-SCNC: 101 MMOL/L (ref 99–110)
CO2: 25 MMOL/L (ref 21–32)
CREAT SERPL-MCNC: 1 MG/DL (ref 0.9–1.3)
DIFFERENTIAL TYPE: ABNORMAL
EOSINOPHILS ABSOLUTE: 0.2 K/CU MM
EOSINOPHILS RELATIVE PERCENT: 2.4 % (ref 0–3)
GFR SERPL CREATININE-BSD FRML MDRD: >60 ML/MIN/1.73M2
GLUCOSE SERPL-MCNC: 162 MG/DL (ref 70–99)
HCT VFR BLD CALC: 39.6 % (ref 42–52)
HEMOGLOBIN: 12.7 GM/DL (ref 13.5–18)
IMMATURE NEUTROPHIL %: 0.3 % (ref 0–0.43)
LIPASE: 56 IU/L (ref 13–60)
LYMPHOCYTES ABSOLUTE: 1.5 K/CU MM
LYMPHOCYTES RELATIVE PERCENT: 22.8 % (ref 24–44)
MCH RBC QN AUTO: 31.2 PG (ref 27–31)
MCHC RBC AUTO-ENTMCNC: 32.1 % (ref 32–36)
MCV RBC AUTO: 97.3 FL (ref 78–100)
MONOCYTES ABSOLUTE: 0.7 K/CU MM
MONOCYTES RELATIVE PERCENT: 10.2 % (ref 0–4)
NUCLEATED RBC %: 0 %
PDW BLD-RTO: 11.9 % (ref 11.7–14.9)
PLATELET # BLD: 242 K/CU MM (ref 140–440)
PMV BLD AUTO: 9.4 FL (ref 7.5–11.1)
POTASSIUM SERPL-SCNC: 4 MMOL/L (ref 3.5–5.1)
RBC # BLD: 4.07 M/CU MM (ref 4.6–6.2)
SEGMENTED NEUTROPHILS ABSOLUTE COUNT: 4.3 K/CU MM
SEGMENTED NEUTROPHILS RELATIVE PERCENT: 64.2 % (ref 36–66)
SODIUM BLD-SCNC: 137 MMOL/L (ref 135–145)
TOTAL IMMATURE NEUTOROPHIL: 0.02 K/CU MM
TOTAL NUCLEATED RBC: 0 K/CU MM
TOTAL PROTEIN: 6.6 GM/DL (ref 6.4–8.2)
WBC # BLD: 6.8 K/CU MM (ref 4–10.5)

## 2023-12-28 PROCEDURE — 83690 ASSAY OF LIPASE: CPT

## 2023-12-28 PROCEDURE — 99282 EMERGENCY DEPT VISIT SF MDM: CPT

## 2023-12-28 PROCEDURE — 80053 COMPREHEN METABOLIC PANEL: CPT

## 2023-12-28 PROCEDURE — 85025 COMPLETE CBC W/AUTO DIFF WBC: CPT

## 2023-12-28 PROCEDURE — 99283 EMERGENCY DEPT VISIT LOW MDM: CPT

## 2023-12-28 RX ORDER — DICYCLOMINE HCL 20 MG
20 TABLET ORAL ONCE
Status: DISCONTINUED | OUTPATIENT
Start: 2023-12-28 | End: 2023-12-28 | Stop reason: HOSPADM

## 2023-12-28 NOTE — ED NOTES
Pt states he is having abdominal pain but really wants us to give him a ride to [facility].

## 2023-12-28 NOTE — ED PROVIDER NOTES
Triage Chief Complaint:   Abdominal Pain    [place]:  Today in the ED I had the pleasure of caring for Sophie Howe who is a 26 y.o. male that presents today to the ED for generalized abdominal pain ongoing x 1 month. No f/c/n/v/d. No urinary sx. Baseline Is &Os. Pain not worse today than his daily stomach pain.  .    ROS:  REVIEW OF SYSTEMS    At least 10 systems reviewed      All other review of systems are negative  See HPI and nursing notes for additional information       Past Medical History:   Diagnosis Date    Bipolar 1 disorder ([address])     Chronic generalized abdominal pain     Depression     GERD (gastroesophageal reflux disease)     IBS (irritable bowel syndrome)     Mental impairment     OCD (obsessive compulsive disorder)     Schizo affective schizophrenia ([address])      Past Surgical History:   Procedure Laterality Date    COLONOSCOPY      does not know    EGD      does not know     Family History   Problem Relation Age of Onset    Colon Cancer Neg Hx     Esophageal Cancer Neg Hx     Liver Cancer Neg Hx     Rectal Cancer Neg Hx     Stomach Cancer Neg Hx      Social History     Socioeconomic History    Marital status: Unknown     Spouse name: Not on file    Number of children: Not on file    Years of education: Not on file    Highest education level: Not on file   Occupational History    Not on file   Tobacco Use    Smoking status: Never    Smokeless tobacco: Never   Vaping Use    Vaping Use: Never used   Substance and Sexual Activity    Alcohol use: Never    Drug use: Never    Sexual activity: Not on file   Other Topics Concern    Not on file   Social History Narrative    Not on file     Social Determinants of Health     Financial Resource Strain: Not on file   Food Insecurity: Not on file   Transportation Needs: Not on file   Physical Activity: Not on file   Stress: Not on file   Social Connections: Not on file   Intimate Partner Violence: Not on file   Housing Stability: Not on file     Current

## 2024-09-07 ENCOUNTER — HOSPITAL ENCOUNTER (EMERGENCY)
Age: 34
Discharge: HOME | End: 2024-09-07
Payer: COMMERCIAL

## 2024-09-07 VITALS
DIASTOLIC BLOOD PRESSURE: 82 MMHG | HEIGHT: 63 IN | OXYGEN SATURATION: 97 % | BODY MASS INDEX: 26.22 KG/M2 | TEMPERATURE: 98.3 F | RESPIRATION RATE: 14 BRPM | WEIGHT: 148 LBS | HEART RATE: 94 BPM | SYSTOLIC BLOOD PRESSURE: 123 MMHG

## 2024-09-07 DIAGNOSIS — Z76.5 MALINGERING: ICD-10-CM

## 2024-09-07 DIAGNOSIS — R07.9 CHEST PAIN, UNSPECIFIED TYPE: Primary | ICD-10-CM

## 2024-09-07 DIAGNOSIS — R10.84 GENERALIZED ABDOMINAL PAIN: ICD-10-CM

## 2024-09-07 PROCEDURE — 96372 THER/PROPH/DIAG INJ SC/IM: CPT

## 2024-09-07 PROCEDURE — 93005 ELECTROCARDIOGRAM TRACING: CPT | Performed by: EMERGENCY MEDICINE

## 2024-09-07 PROCEDURE — 99284 EMERGENCY DEPT VISIT MOD MDM: CPT

## 2024-09-07 PROCEDURE — 6370000000 HC RX 637 (ALT 250 FOR IP): Performed by: PHYSICIAN ASSISTANT

## 2024-09-07 PROCEDURE — 6360000002 HC RX W HCPCS: Performed by: PHYSICIAN ASSISTANT

## 2024-09-07 RX ORDER — DICYCLOMINE HCL 20 MG
20 TABLET ORAL ONCE
Status: COMPLETED | OUTPATIENT
Start: 2024-09-07 | End: 2024-09-07

## 2024-09-07 RX ORDER — KETOROLAC TROMETHAMINE 30 MG/ML
30 INJECTION, SOLUTION INTRAMUSCULAR; INTRAVENOUS ONCE
Status: COMPLETED | OUTPATIENT
Start: 2024-09-07 | End: 2024-09-07

## 2024-09-07 RX ADMIN — DICYCLOMINE HYDROCHLORIDE 20 MG: 20 TABLET ORAL at 15:47

## 2024-09-07 RX ADMIN — KETOROLAC TROMETHAMINE 30 MG: 30 INJECTION, SOLUTION INTRAMUSCULAR at 15:49

## 2024-09-07 ASSESSMENT — PAIN DESCRIPTION - DESCRIPTORS
DESCRIPTORS: SHOOTING
DESCRIPTORS: SHOOTING
DESCRIPTORS: DISCOMFORT

## 2024-09-07 ASSESSMENT — PAIN DESCRIPTION - PAIN TYPE: TYPE: ACUTE PAIN

## 2024-09-07 ASSESSMENT — PAIN DESCRIPTION - ORIENTATION
ORIENTATION: MID

## 2024-09-07 ASSESSMENT — PAIN SCALES - GENERAL
PAINLEVEL_OUTOF10: 10

## 2024-09-07 ASSESSMENT — LIFESTYLE VARIABLES
HOW MANY STANDARD DRINKS CONTAINING ALCOHOL DO YOU HAVE ON A TYPICAL DAY: PATIENT DOES NOT DRINK
HOW OFTEN DO YOU HAVE A DRINK CONTAINING ALCOHOL: NEVER

## 2024-09-07 ASSESSMENT — PAIN - FUNCTIONAL ASSESSMENT: PAIN_FUNCTIONAL_ASSESSMENT: 0-10

## 2024-09-07 ASSESSMENT — PAIN DESCRIPTION - LOCATION
LOCATION: ABDOMEN
LOCATION: ABDOMEN

## 2024-09-07 NOTE — ED PROVIDER NOTES
[facility]  ED  EMERGENCY DEPARTMENT ENCOUNTER        Pt Name: Mario Orellana  MRN: [id number]  Birthdate 1990  Date of evaluation: 9/7/2024  Provider: YNES JIMÉNEZ PA-C  PCP: No primary care provider on file.  ED Attending: MD Hemalatha      TALA. I have evaluated this patient.      CHIEF COMPLAINT:     Chief Complaint   Patient presents with    Chest Pain     Patient was walking down the [facility] to [place], is homeless, EMS called for chest pain, was at [facility] for the same thing today \"but they don't do their job\". Pt reports CP \"for months\" and palpitations when he smokes cigarettes.       HISTORY OF PRESENT ILLNESS:      History provided by the patient. No limitations.    Mario Orellana is a 34 y.o. male who arrives to the ED by EMS.  This patient was brought in after he was seen in a walk[place]in [place] on the [facility] on his way to [place].  Apparently the state police called the EMS due to the patient complaining of chest pain.  He was at  earlier today with chest pain and had a workup done.  Labs can be seen in care everywhere.  Patient tells me he has had this chest pain for months.  He states he also has abdominal pain.  He told the nurse he develops palpitations when he smokes cigarettes.  He is here requesting IV fluids, a shot of Toradol and a shot of Bentyl.    Nursing Notes were reviewed     REVIEW OF SYSTEMS:     Review of Systems  Positives and pertinent negatives as per HPI.      PAST MEDICAL HISTORY:     Past Medical History:   Diagnosis Date    Bipolar 1 disorder (HCC)     Chronic generalized abdominal pain     Depression     GERD (gastroesophageal reflux disease)     IBS (irritable bowel syndrome)     Mental impairment     OCD (obsessive compulsive disorder)     Schizo affective schizophrenia (HCC)        SURGICAL HISTORY:      Past Surgical History:   Procedure Laterality Date    COLONOSCOPY      does not know    EGD      does not know       CURRENT MEDICATIONS:    Present (7/24/2020)    Received from [facility]    [place] [employer] - Occupational Stress Questionnaire     Feeling of Stress : To some extent   Social Connections: Socially Isolated (7/24/2020)    Received from [facility]    Social Connection and Isolation Panel [NHANES]     Frequency of Communication with Friends and Family: Once a week     Frequency of Social Gatherings with Friends and Family: Never     Attends Druze Services: Never     Active Member of Clubs or Organizations: Yes     Attends Club or Organization Meetings: 1 to 4 times per year     Marital Status: Never    Intimate Partner Violence: Not At Risk (6/1/2024)    Received from [facility]    Humiliation, Afraid, Rape, and Kick questionnaire     Fear of Current or Ex-Partner: No     Emotionally Abused: No     Physically Abused: No     Sexually Abused: No    Received from [employer]     Housing/Utilities       SCREENINGS:           CIWA Assessment  BP: 123/82  Pulse: 94        PHYSICAL EXAM:       ED Triage Vitals   BP Systolic BP Percentile Diastolic BP Percentile Temp Temp Source Pulse Respirations SpO2   09/07/24 1504 -- -- 09/07/24 1504 09/07/24 1504 09/07/24 1504 09/07/24 1504 09/07/24 1504   101/70   98.3 °F (36.8 °C) Oral 93 18 95 %      Height Weight - Scale         09/07/24 1500 09/07/24 1500         1.6 m (5' 3\") 67.1 kg (148 lb)             Physical Exam    CONSTITUTIONAL: Awake and alert. Cooperative. Well-developed. Well-nourished. Non-toxic. No acute distress.  HENT: Normocephalic. Atraumatic. External ears normal, without discharge. No nasal discharge. Oropharynx clear. Mucous membranes moist.  EYES: Conjunctiva non-injected. No scleral icterus. PERRL.     NECK: Supple. Normal ROM.   CARDIOVASCULAR: RRR. Intact distal pulses.  PULMONARY/CHEST WALL: Effort normal. No tachypnea. Lungs clear to ausculation.   ABDOMEN: Soft. Nondistended. No tenderness to

## 2024-09-07 NOTE — ED NOTES
Patient given 2 turkey sandwiches, 2 pepsi, and a folder for homeless resources.  Patient expressing frustration about \"no one ever fixes my pain every hospital discharges me it is bull shit\"   at bedside for discharge instruction with RN.  Pt refused last set of VS. Left this ER in stable condition with no acute signs of distress GCS 15 resps unlabored.

## 2024-09-08 LAB
EKG ATRIAL RATE: 100 BPM
EKG DIAGNOSIS: NORMAL
EKG P AXIS: 45 DEGREES
EKG P-R INTERVAL: 114 MS
EKG Q-T INTERVAL: 354 MS
EKG QRS DURATION: 92 MS
EKG QTC CALCULATION (BAZETT): 456 MS
EKG R AXIS: 18 DEGREES
EKG T AXIS: 21 DEGREES
EKG VENTRICULAR RATE: 100 BPM

## 2024-09-08 PROCEDURE — 93010 ELECTROCARDIOGRAM REPORT: CPT | Performed by: INTERNAL MEDICINE

## 2024-10-14 ENCOUNTER — CLINICAL DOCUMENTATION (OUTPATIENT)
Dept: CASE MANAGEMENT | Age: 34
End: 2024-10-14

## 2024-10-14 NOTE — PROGRESS NOTES
Patient Management Plan              Pt. Name: Mario Orellana  : 1990           Date plan entered: 10/14/24        Patients Physicians:     Primary Care  : No primary care provider on file.  Contact #:    Specialists:    Contact #:                                                   Summary        Reason for Referral: This patient has been provided a management plan for Social Needs and Psychiatric Needs                 Patient has had frequent visits for:  34 visits in last 3 months.  Homeless, mental health                Warnings/Safety Alerts:           Goals/Interventions:      Avoid opiates unless new disease process such as acute fracture  Review records for previous work ups  Review OARRS     Refer to PCP, [facility]     Refer to [facility]     I would make sure that the [facility] is on the plan as a point of contact when he is admitted as it may be difficult to locate the patient in the community if he needs to move around often for shelter. Getting connected to [facility] at large for community management of his depressive symptoms and to provide therapy would be a good added value if they are able to assist with transportation to appointments. I think this would be a good step to attempt while he is still in the emergency room so that they may be able to come immediately out to see him and get him connected. As I am sure that you know, shelter in our areas is almost non-existent.  Consult SW for home situation     Substance abuse       [facility]       Outpatient: [facility] - INDIVIDUALS   [facility]: [phone]     [employer] [facility] [place] ---------------------------------------- [phone] (SAFE) [address] [place] [address]; [place], [place]   Website: [email or web address] ? Offers shelter to sixteen men, with

## 2024-12-06 ENCOUNTER — HOSPITAL ENCOUNTER (EMERGENCY)
Age: 34
Discharge: HOME | End: 2024-12-06
Attending: EMERGENCY MEDICINE
Payer: MEDICARE

## 2024-12-06 VITALS
OXYGEN SATURATION: 100 % | RESPIRATION RATE: 18 BRPM | HEART RATE: 95 BPM | DIASTOLIC BLOOD PRESSURE: 70 MMHG | TEMPERATURE: 98.5 F | SYSTOLIC BLOOD PRESSURE: 108 MMHG

## 2024-12-06 DIAGNOSIS — R10.9 CHRONIC ABDOMINAL PAIN: ICD-10-CM

## 2024-12-06 DIAGNOSIS — R10.13 ABDOMINAL PAIN, EPIGASTRIC: Primary | ICD-10-CM

## 2024-12-06 DIAGNOSIS — G89.29 CHRONIC ABDOMINAL PAIN: ICD-10-CM

## 2024-12-06 LAB
ALBUMIN SERPL-MCNC: 3.9 G/DL (ref 3.4–5)
ALBUMIN/GLOB SERPL: 1.4 {RATIO} (ref 1.1–2.2)
ALP SERPL-CCNC: 68 U/L (ref 40–129)
ALT SERPL-CCNC: 10 U/L (ref 10–40)
ANION GAP SERPL CALCULATED.3IONS-SCNC: 9 MMOL/L (ref 9–17)
AST SERPL-CCNC: 22 U/L (ref 15–37)
ATYPICAL LYMPHOCYTE ABSOLUTE COUNT: 0.1 K/UL
ATYPICAL LYMPHOCYTES: 2 %
BASOPHILS # BLD: 0.05 K/UL
BASOPHILS NFR BLD: 1 % (ref 0–1)
BILIRUB SERPL-MCNC: <0.2 MG/DL (ref 0–1)
BUN SERPL-MCNC: 13 MG/DL (ref 7–20)
CALCIUM SERPL-MCNC: 8.9 MG/DL (ref 8.3–10.6)
CHLORIDE SERPL-SCNC: 106 MMOL/L (ref 99–110)
CO2 SERPL-SCNC: 29 MMOL/L (ref 21–32)
CREAT SERPL-MCNC: 0.9 MG/DL (ref 0.9–1.3)
EOSINOPHIL # BLD: 0.38 K/UL
EOSINOPHILS RELATIVE PERCENT: 8 % (ref 0–3)
ERYTHROCYTE [DISTWIDTH] IN BLOOD BY AUTOMATED COUNT: 12 % (ref 11.7–14.9)
GFR, ESTIMATED: >90 ML/MIN/1.73M2
GLUCOSE SERPL-MCNC: 114 MG/DL (ref 74–99)
HCT VFR BLD AUTO: 40.6 % (ref 42–52)
HGB BLD-MCNC: 13 G/DL (ref 13.5–18)
LIPASE SERPL-CCNC: 49 U/L (ref 13–60)
LYMPHOCYTES NFR BLD: 1.06 K/UL
LYMPHOCYTES RELATIVE PERCENT: 22 % (ref 24–44)
MCH RBC QN AUTO: 30.8 PG (ref 27–31)
MCHC RBC AUTO-ENTMCNC: 32 G/DL (ref 32–36)
MCV RBC AUTO: 96.2 FL (ref 78–100)
MONOCYTES NFR BLD: 0.29 K/UL
MONOCYTES NFR BLD: 6 % (ref 0–4)
NEUTROPHILS NFR BLD: 61 % (ref 36–66)
NEUTS SEG NFR BLD: 2.93 K/UL
PLATELET # BLD AUTO: 232 K/UL (ref 140–440)
PLATELET ESTIMATE: NORMAL
PMV BLD AUTO: 9.6 FL (ref 7.5–11.1)
POTASSIUM SERPL-SCNC: 3.9 MMOL/L (ref 3.5–5.1)
PROT SERPL-MCNC: 6.6 G/DL (ref 6.4–8.2)
RBC # BLD AUTO: 4.22 M/UL (ref 4.6–6.2)
RBC # BLD: NORMAL 10*6/UL
SODIUM SERPL-SCNC: 143 MMOL/L (ref 136–145)
WBC # BLD: NORMAL 10*3/UL
WBC OTHER # BLD: 4.8 K/UL (ref 4–10.5)

## 2024-12-06 PROCEDURE — 99283 EMERGENCY DEPT VISIT LOW MDM: CPT

## 2024-12-06 PROCEDURE — 80053 COMPREHEN METABOLIC PANEL: CPT

## 2024-12-06 PROCEDURE — 85025 COMPLETE CBC W/AUTO DIFF WBC: CPT

## 2024-12-06 PROCEDURE — 83690 ASSAY OF LIPASE: CPT

## 2024-12-06 PROCEDURE — 6370000000 HC RX 637 (ALT 250 FOR IP): Performed by: EMERGENCY MEDICINE

## 2024-12-06 RX ORDER — DICYCLOMINE HYDROCHLORIDE 10 MG/1
10 CAPSULE ORAL
Qty: 30 CAPSULE | Refills: 0 | Status: SHIPPED | OUTPATIENT
Start: 2024-12-06 | End: 2024-12-09

## 2024-12-06 RX ORDER — ONDANSETRON 4 MG/1
4 TABLET, ORALLY DISINTEGRATING ORAL ONCE
Status: COMPLETED | OUTPATIENT
Start: 2024-12-06 | End: 2024-12-06

## 2024-12-06 RX ORDER — ONDANSETRON 4 MG/1
4 TABLET, ORALLY DISINTEGRATING ORAL 3 TIMES DAILY PRN
Qty: 21 TABLET | Refills: 0 | Status: SHIPPED | OUTPATIENT
Start: 2024-12-06

## 2024-12-06 RX ORDER — DICYCLOMINE HCL 20 MG
20 TABLET ORAL ONCE
Status: COMPLETED | OUTPATIENT
Start: 2024-12-06 | End: 2024-12-06

## 2024-12-06 RX ADMIN — DICYCLOMINE HYDROCHLORIDE 20 MG: 20 TABLET ORAL at 07:24

## 2024-12-06 RX ADMIN — ONDANSETRON 4 MG: 4 TABLET, ORALLY DISINTEGRATING ORAL at 07:24

## 2024-12-06 ASSESSMENT — PAIN SCALES - GENERAL: PAINLEVEL_OUTOF10: 7

## 2024-12-06 ASSESSMENT — PAIN DESCRIPTION - DESCRIPTORS: DESCRIPTORS: DISCOMFORT

## 2024-12-06 ASSESSMENT — PAIN - FUNCTIONAL ASSESSMENT: PAIN_FUNCTIONAL_ASSESSMENT: 0-10

## 2024-12-06 ASSESSMENT — PAIN DESCRIPTION - LOCATION: LOCATION: ABDOMEN

## 2024-12-06 NOTE — ED PROVIDER NOTES
EMERGENCY DEPARTMENT ENCOUNTER      CHIEF COMPLAINT:   Chronic abdominal pain    HPI: Mario Orellana is a 34 y.o. male who presents to the Emergency Department complaining of epigastric abdominal cramping.  The patient has a history of frequent pain in this area.  He states that his mildly uncomfortable.  The pain is constant.. There are no exacerbating or relieving factors.  He denies any associated nausea, vomiting, diarrhea or constipation.  He was seen in the emergency department earlier today with similar complaints and had a reassuring workup.  The patient also states that he is homeless and cold and needs to sleep.  The patient denies fevers, chills, hematemesis, bloody stools, flank pain, or any other complaints..      REVIEW OF SYSTEMS:  CONSTITUTIONAL:  Denies fever, chills, weight loss or weakness  EYES:  Denies photophobia or discharge  ENT:  Denies sore throat or ear pain  CARDIOVASCULAR:  Denies chest pain, palpitations or swelling  RESPIRATORY:  Denies cough or shortness of breath  GI: See HPI  MUSCULOSKELETAL:  Denies back pain  SKIN:  No rash  NEUROLOGIC:  Denies headache, focal weakness or sensory changes  All systems negative except as marked.  \"Remaining review of systems reviewed and negative. I have reviewed the nursing triage documentation and agree unless otherwise noted below.\"    PAST MEDICAL HISTORY:   Past Medical History:   Diagnosis Date    Bipolar 1 disorder (HCC)     Chronic generalized abdominal pain     Depression     GERD (gastroesophageal reflux disease)     IBS (irritable bowel syndrome)     Mental impairment     OCD (obsessive compulsive disorder)     Schizo affective schizophrenia (HCC)        CURRENT MEDICATIONS:   Home medications reviewed.    SURGICAL HISTORY:   Past Surgical History:   Procedure Laterality Date    COLONOSCOPY      does not know    EGD      does not know       FAMILY HISTORY:   Family History   Problem Relation Age of Onset    Colon Cancer Neg Hx      Gatherings with Friends and Family: Never     Attends Bahai Services: Never     Active Member of Clubs or Organizations: Yes     Attends Club or Organization Meetings: 1 to 4 times per year     Marital Status: Never    Intimate Partner Violence: Not At Risk (6/1/2024)    Received from [facility]    Humiliation, Afraid, Rape, and Kick questionnaire     Fear of Current or Ex-Partner: No     Emotionally Abused: No     Physically Abused: No     Sexually Abused: No   Housing Stability: Unknown (1/23/2024)    [facility]    Housing/Utilities     Worried about losing home: Not on file     Stayed outside house: Not on file     Unable to get utilities: Not on file       ALLERGIES: Latex, Adhesive tape, Codeine, Iodine, and Shellfish-derived products    PHYSICAL EXAM:  VITAL SIGNS:   ED Triage Vitals [12/06/24 0315]   Encounter Vitals Group      /70      Systolic BP Percentile       Diastolic BP Percentile       Pulse 96      Respirations 20      Temp 98.5 °F (36.9 °C)      Temp Source Oral      SpO2 100 %      Weight       Height       Head Circumference       Peak Flow       Pain Score       Pain Loc       Pain Education       Exclude from Growth Chart      Constitutional:  Non-toxic appearance  HENT: Normocephalic, Atraumatic, Bilateral external ears normal, Oropharynx moist, No oral exudates, Nose normal.  Eyes: PERRL, conjunctiva normal   Neck: Normal range of motion, No tenderness, Supple, No stridor,No lymphadenopathy   Cardiovascular:  Normal heart rate, Normal rhythm  Pulmonary/Chest:  Normal breath sounds, No respiratory distress, No wheezing  Abdomen:  Bowel sounds normal, Soft, mild epigastric tenderness to palpation, no guarding, no rebound, No masses, No pulsatile masses  Back:  No tenderness, No CVA tenderness  Extremities:  Normal range of motion, Intact distal pulses, No edema, No tenderness  Skin:  Warm, Dry, No erythema, No rash      EKG:

## 2024-12-08 ENCOUNTER — HOSPITAL ENCOUNTER (EMERGENCY)
Age: 34
Discharge: HOME | End: 2024-12-08
Payer: MEDICARE

## 2024-12-08 ENCOUNTER — HOSPITAL ENCOUNTER (EMERGENCY)
Age: 34
Discharge: HOME | End: 2024-12-08
Attending: EMERGENCY MEDICINE

## 2024-12-08 ENCOUNTER — HOSPITAL ENCOUNTER (EMERGENCY)
Age: 34
Discharge: HOME | End: 2024-12-08
Attending: EMERGENCY MEDICINE
Payer: MEDICARE

## 2024-12-08 VITALS — HEIGHT: 63 IN | WEIGHT: 148 LBS | BODY MASS INDEX: 26.22 KG/M2

## 2024-12-08 VITALS
RESPIRATION RATE: 18 BRPM | HEIGHT: 63 IN | OXYGEN SATURATION: 96 % | TEMPERATURE: 98.3 F | BODY MASS INDEX: 26.22 KG/M2 | SYSTOLIC BLOOD PRESSURE: 115 MMHG | DIASTOLIC BLOOD PRESSURE: 80 MMHG | HEART RATE: 100 BPM

## 2024-12-08 DIAGNOSIS — R10.9 CHRONIC ABDOMINAL PAIN: Primary | ICD-10-CM

## 2024-12-08 DIAGNOSIS — G89.29 CHRONIC ABDOMINAL PAIN: Primary | ICD-10-CM

## 2024-12-08 PROCEDURE — 99284 EMERGENCY DEPT VISIT MOD MDM: CPT

## 2024-12-08 PROCEDURE — 6360000002 HC RX W HCPCS: Performed by: EMERGENCY MEDICINE

## 2024-12-08 PROCEDURE — 96372 THER/PROPH/DIAG INJ SC/IM: CPT

## 2024-12-08 PROCEDURE — 99283 EMERGENCY DEPT VISIT LOW MDM: CPT

## 2024-12-08 PROCEDURE — 6370000000 HC RX 637 (ALT 250 FOR IP): Performed by: EMERGENCY MEDICINE

## 2024-12-08 RX ORDER — MAGNESIUM HYDROXIDE/ALUMINUM HYDROXICE/SIMETHICONE 120; 1200; 1200 MG/30ML; MG/30ML; MG/30ML
30 SUSPENSION ORAL ONCE
Status: COMPLETED | OUTPATIENT
Start: 2024-12-08 | End: 2024-12-08

## 2024-12-08 RX ORDER — DICYCLOMINE HCL 20 MG
20 TABLET ORAL ONCE
Status: DISCONTINUED | OUTPATIENT
Start: 2024-12-08 | End: 2024-12-08 | Stop reason: HOSPADM

## 2024-12-08 RX ORDER — KETOROLAC TROMETHAMINE 15 MG/ML
30 INJECTION, SOLUTION INTRAMUSCULAR; INTRAVENOUS ONCE
Status: COMPLETED | OUTPATIENT
Start: 2024-12-08 | End: 2024-12-08

## 2024-12-08 RX ORDER — DICYCLOMINE HYDROCHLORIDE 10 MG/ML
20 INJECTION INTRAMUSCULAR ONCE
Status: COMPLETED | OUTPATIENT
Start: 2024-12-08 | End: 2024-12-08

## 2024-12-08 RX ORDER — LIDOCAINE HYDROCHLORIDE 20 MG/ML
15 SOLUTION OROPHARYNGEAL ONCE
Status: COMPLETED | OUTPATIENT
Start: 2024-12-08 | End: 2024-12-08

## 2024-12-08 RX ADMIN — LIDOCAINE HYDROCHLORIDE 15 ML: 20 SOLUTION ORAL at 13:37

## 2024-12-08 RX ADMIN — DICYCLOMINE HYDROCHLORIDE 20 MG: 10 INJECTION, SOLUTION INTRAMUSCULAR at 13:37

## 2024-12-08 RX ADMIN — KETOROLAC TROMETHAMINE 30 MG: 15 INJECTION, SOLUTION INTRAMUSCULAR; INTRAVENOUS at 13:37

## 2024-12-08 RX ADMIN — ALUMINUM HYDROXIDE, MAGNESIUM HYDROXIDE, AND SIMETHICONE 30 ML: 200; 200; 20 SUSPENSION ORAL at 13:37

## 2024-12-08 ASSESSMENT — PAIN DESCRIPTION - LOCATION
LOCATION: ABDOMEN
LOCATION: ABDOMEN

## 2024-12-08 ASSESSMENT — PAIN - FUNCTIONAL ASSESSMENT: PAIN_FUNCTIONAL_ASSESSMENT: 0-10

## 2024-12-08 ASSESSMENT — LIFESTYLE VARIABLES
HOW OFTEN DO YOU HAVE A DRINK CONTAINING ALCOHOL: NEVER
HOW MANY STANDARD DRINKS CONTAINING ALCOHOL DO YOU HAVE ON A TYPICAL DAY: PATIENT DOES NOT DRINK

## 2024-12-08 ASSESSMENT — PAIN SCALES - GENERAL
PAINLEVEL_OUTOF10: 6
PAINLEVEL_OUTOF10: 10

## 2024-12-08 NOTE — ED PROVIDER NOTES
Trachea midline.   Extremity:  No swelling.  Normal ROM     Heart:  Regular rate and rhythm, normal S1 & S2, no extra heart sounds.    Perfusion:  Intact   Respiratory:  Lungs clear to auscultation bilaterally.  Respirations nonlabored.     Abdominal:  Normal bowel sounds.  Soft.  Mild diffuse tenderness to palpation without any rebound or guarding.  Negative true Leonard's.  No point tenderness McBurney's.  Thin.  Non distended.  Back:  No CVA tenderness to palpation     Neurological:  Alert and oriented times 3.  No focal neuro deficits.             Psychiatric:  Appropriate    I have reviewed and interpreted all of the currently available lab results from this visit (if applicable):  No results found for this visit on 12/08/24.   Radiographs (if obtained):  [] The following radiograph was interpreted by myself in the absence of a radiologist:   [] Radiologist's Report Reviewed:  No orders to display         EKG (if obtained): (All EKG's are interpreted by myself in the absence of a cardiologist)    Chart review shows recent radiographs:  No results found.    MDM:  CC/HPI Summary, DDx, ED Course, and Reassessment:  Pt presents as above.  Emergent conditions considered.  Presentation prompted initial history and physical.  I did consider labs and imaging however, abdomen is benign and vital signs are stable and patient is overall very well-appearing.  Patient is seen almost daily for abdominal pain including 2 visits just 2 days ago where labs were performed which were reassuring.  Patient is requesting symptomatic treatment which is reasonable and intramuscular Bentyl, intramuscular Toradol and GI cocktail were given.  I suspect patient's most recent issue is secondary to his mild congestion last night given his lactose intolerance.      History from : Patient    Limitations to history : None    Patient was given the following medications:  Medications   ketorolac (TORADOL) injection 30 mg (30 mg IntraMUSCular  grammar, punctuation, and spelling, as well as words and phrases that may be inappropriate. If there are any questions or concerns please feel free to contact the dictating provider for clarification.       Kayden Guidry MD  12/08/24 [id number]

## 2024-12-08 NOTE — ED NOTES
Upon cleaning patient's room, I found his cell phone left in the bed. I placed phone in bag with patient labels and took to security desk.

## 2024-12-08 NOTE — ED NOTES
Instructed patient to avoid milk products as he told me he is lactose intolerant. Instructed him to take the bentyl that was prescribed at his previous visit. Ambulatory out of his room without difficulty. Gave ginger ale to patient per his request.

## 2024-12-09 ENCOUNTER — HOSPITAL ENCOUNTER (EMERGENCY)
Age: 34
Discharge: HOME | End: 2024-12-09
Attending: EMERGENCY MEDICINE
Payer: MEDICARE

## 2024-12-09 ENCOUNTER — APPOINTMENT (OUTPATIENT)
Dept: GENERAL RADIOLOGY | Age: 34
End: 2024-12-09
Payer: MEDICARE

## 2024-12-09 ENCOUNTER — APPOINTMENT (OUTPATIENT)
Dept: CT IMAGING | Age: 34
End: 2024-12-09
Payer: MEDICARE

## 2024-12-09 ENCOUNTER — HOSPITAL ENCOUNTER (EMERGENCY)
Age: 34
Discharge: HOME | End: 2024-12-09
Payer: MEDICARE

## 2024-12-09 VITALS
DIASTOLIC BLOOD PRESSURE: 97 MMHG | HEART RATE: 109 BPM | SYSTOLIC BLOOD PRESSURE: 144 MMHG | OXYGEN SATURATION: 99 % | TEMPERATURE: 98.2 F

## 2024-12-09 VITALS
OXYGEN SATURATION: 98 % | DIASTOLIC BLOOD PRESSURE: 75 MMHG | HEART RATE: 96 BPM | BODY MASS INDEX: 26.22 KG/M2 | WEIGHT: 148 LBS | TEMPERATURE: 98 F | HEIGHT: 63 IN | RESPIRATION RATE: 16 BRPM | SYSTOLIC BLOOD PRESSURE: 103 MMHG

## 2024-12-09 DIAGNOSIS — R10.9 CHRONIC ABDOMINAL PAIN: Primary | ICD-10-CM

## 2024-12-09 DIAGNOSIS — Z13.9 ENCOUNTER FOR MEDICAL SCREENING EXAMINATION: Primary | ICD-10-CM

## 2024-12-09 DIAGNOSIS — G89.29 CHRONIC ABDOMINAL PAIN: Primary | ICD-10-CM

## 2024-12-09 LAB
ALBUMIN SERPL-MCNC: 4.5 G/DL (ref 3.4–5)
ALBUMIN/GLOB SERPL: 1.5 {RATIO} (ref 1.1–2.2)
ALP SERPL-CCNC: 85 U/L (ref 40–129)
ALT SERPL-CCNC: 35 U/L (ref 10–40)
ANION GAP SERPL CALCULATED.3IONS-SCNC: 10 MMOL/L (ref 9–17)
AST SERPL-CCNC: 50 U/L (ref 15–37)
BASOPHILS # BLD: 0.03 K/UL
BASOPHILS NFR BLD: 0 % (ref 0–1)
BILIRUB SERPL-MCNC: 0.3 MG/DL (ref 0–1)
BILIRUB UR QL STRIP: NEGATIVE
BUN SERPL-MCNC: 10 MG/DL (ref 7–20)
CALCIUM SERPL-MCNC: 9.6 MG/DL (ref 8.3–10.6)
CHLORIDE SERPL-SCNC: 103 MMOL/L (ref 99–110)
CLARITY UR: CLEAR
CO2 SERPL-SCNC: 28 MMOL/L (ref 21–32)
COLOR UR: YELLOW
COMMENT: NORMAL
CREAT SERPL-MCNC: 0.9 MG/DL (ref 0.9–1.3)
EOSINOPHIL # BLD: 0.2 K/UL
EOSINOPHILS RELATIVE PERCENT: 3 % (ref 0–3)
ERYTHROCYTE [DISTWIDTH] IN BLOOD BY AUTOMATED COUNT: 11.9 % (ref 11.7–14.9)
GFR, ESTIMATED: >90 ML/MIN/1.73M2
GLUCOSE SERPL-MCNC: 98 MG/DL (ref 74–99)
GLUCOSE UR STRIP-MCNC: NEGATIVE MG/DL
HCT VFR BLD AUTO: 45.7 % (ref 42–52)
HGB BLD-MCNC: 14.6 G/DL (ref 13.5–18)
HGB UR QL STRIP.AUTO: NEGATIVE
IMM GRANULOCYTES # BLD AUTO: 0.03 K/UL
IMM GRANULOCYTES NFR BLD: 0 %
KETONES UR STRIP-MCNC: NEGATIVE MG/DL
LEUKOCYTE ESTERASE UR QL STRIP: NEGATIVE
LIPASE SERPL-CCNC: 51 U/L (ref 13–60)
LYMPHOCYTES NFR BLD: 1.01 K/UL
LYMPHOCYTES RELATIVE PERCENT: 13 % (ref 24–44)
MCH RBC QN AUTO: 31 PG (ref 27–31)
MCHC RBC AUTO-ENTMCNC: 31.9 G/DL (ref 32–36)
MCV RBC AUTO: 97 FL (ref 78–100)
MONOCYTES NFR BLD: 0.66 K/UL
MONOCYTES NFR BLD: 9 % (ref 0–4)
NEUTROPHILS NFR BLD: 75 % (ref 36–66)
NEUTS SEG NFR BLD: 5.82 K/UL
NITRITE UR QL STRIP: NEGATIVE
PH UR STRIP: 6 [PH] (ref 5–8)
PLATELET # BLD AUTO: 267 K/UL (ref 140–440)
PMV BLD AUTO: 9.5 FL (ref 7.5–11.1)
POTASSIUM SERPL-SCNC: 4.4 MMOL/L (ref 3.5–5.1)
PROT SERPL-MCNC: 7.5 G/DL (ref 6.4–8.2)
PROT UR STRIP-MCNC: NEGATIVE MG/DL
RBC # BLD AUTO: 4.71 M/UL (ref 4.6–6.2)
SODIUM SERPL-SCNC: 141 MMOL/L (ref 136–145)
SP GR UR STRIP: 1.01 (ref 1–1.03)
TROPONIN I SERPL HS-MCNC: 16 NG/L (ref 0–22)
UROBILINOGEN UR STRIP-ACNC: 0.2 EU/DL (ref 0–1)
WBC OTHER # BLD: 7.8 K/UL (ref 4–10.5)

## 2024-12-09 PROCEDURE — 99285 EMERGENCY DEPT VISIT HI MDM: CPT

## 2024-12-09 PROCEDURE — 96374 THER/PROPH/DIAG INJ IV PUSH: CPT

## 2024-12-09 PROCEDURE — 83690 ASSAY OF LIPASE: CPT

## 2024-12-09 PROCEDURE — 99284 EMERGENCY DEPT VISIT MOD MDM: CPT

## 2024-12-09 PROCEDURE — 84484 ASSAY OF TROPONIN QUANT: CPT

## 2024-12-09 PROCEDURE — 6360000002 HC RX W HCPCS: Performed by: EMERGENCY MEDICINE

## 2024-12-09 PROCEDURE — 85025 COMPLETE CBC W/AUTO DIFF WBC: CPT

## 2024-12-09 PROCEDURE — 81003 URINALYSIS AUTO W/O SCOPE: CPT

## 2024-12-09 PROCEDURE — 74176 CT ABD & PELVIS W/O CONTRAST: CPT

## 2024-12-09 PROCEDURE — 6360000002 HC RX W HCPCS

## 2024-12-09 PROCEDURE — 80053 COMPREHEN METABOLIC PANEL: CPT

## 2024-12-09 PROCEDURE — 96372 THER/PROPH/DIAG INJ SC/IM: CPT

## 2024-12-09 PROCEDURE — 71045 X-RAY EXAM CHEST 1 VIEW: CPT

## 2024-12-09 PROCEDURE — 6370000000 HC RX 637 (ALT 250 FOR IP)

## 2024-12-09 PROCEDURE — 93005 ELECTROCARDIOGRAM TRACING: CPT

## 2024-12-09 RX ORDER — IBUPROFEN 600 MG/1
600 TABLET, FILM COATED ORAL 3 TIMES DAILY PRN
Qty: 30 TABLET | Refills: 0 | Status: SHIPPED | OUTPATIENT
Start: 2024-12-09

## 2024-12-09 RX ORDER — DICYCLOMINE HCL 20 MG
10 TABLET ORAL ONCE
Status: COMPLETED | OUTPATIENT
Start: 2024-12-09 | End: 2024-12-09

## 2024-12-09 RX ORDER — DICYCLOMINE HYDROCHLORIDE 10 MG/1
10 CAPSULE ORAL
Qty: 120 CAPSULE | Refills: 0 | Status: SHIPPED | OUTPATIENT
Start: 2024-12-09

## 2024-12-09 RX ORDER — KETOROLAC TROMETHAMINE 15 MG/ML
30 INJECTION, SOLUTION INTRAMUSCULAR; INTRAVENOUS ONCE
Status: COMPLETED | OUTPATIENT
Start: 2024-12-09 | End: 2024-12-09

## 2024-12-09 RX ORDER — DICYCLOMINE HYDROCHLORIDE 10 MG/ML
20 INJECTION INTRAMUSCULAR ONCE
Status: COMPLETED | OUTPATIENT
Start: 2024-12-09 | End: 2024-12-09

## 2024-12-09 RX ORDER — KETOROLAC TROMETHAMINE 15 MG/ML
15 INJECTION, SOLUTION INTRAMUSCULAR; INTRAVENOUS ONCE
Status: COMPLETED | OUTPATIENT
Start: 2024-12-09 | End: 2024-12-09

## 2024-12-09 RX ORDER — ASPIRIN 81 MG/1
TABLET, CHEWABLE ORAL
Status: DISCONTINUED
Start: 2024-12-09 | End: 2024-12-09 | Stop reason: HOSPADM

## 2024-12-09 RX ADMIN — DICYCLOMINE HYDROCHLORIDE 10 MG: 20 TABLET ORAL at 10:43

## 2024-12-09 RX ADMIN — DICYCLOMINE HYDROCHLORIDE 20 MG: 10 INJECTION, SOLUTION INTRAMUSCULAR at 20:54

## 2024-12-09 RX ADMIN — KETOROLAC TROMETHAMINE 15 MG: 15 INJECTION, SOLUTION INTRAMUSCULAR; INTRAVENOUS at 10:41

## 2024-12-09 RX ADMIN — KETOROLAC TROMETHAMINE 30 MG: 15 INJECTION, SOLUTION INTRAMUSCULAR; INTRAVENOUS at 20:54

## 2024-12-09 ASSESSMENT — PAIN SCALES - GENERAL
PAINLEVEL_OUTOF10: 0
PAINLEVEL_OUTOF10: 10
PAINLEVEL_OUTOF10: 10
PAINLEVEL_OUTOF10: 8
PAINLEVEL_OUTOF10: 10

## 2024-12-09 ASSESSMENT — PAIN DESCRIPTION - LOCATION
LOCATION: ABDOMEN
LOCATION: ABDOMEN
LOCATION: CHEST;ABDOMEN
LOCATION: ABDOMEN

## 2024-12-09 ASSESSMENT — PAIN DESCRIPTION - ORIENTATION: ORIENTATION: ANTERIOR

## 2024-12-09 ASSESSMENT — PAIN - FUNCTIONAL ASSESSMENT
PAIN_FUNCTIONAL_ASSESSMENT: 0-10

## 2024-12-09 ASSESSMENT — PAIN DESCRIPTION - DESCRIPTORS: DESCRIPTORS: CRUSHING

## 2024-12-09 ASSESSMENT — HEART SCORE: ECG: NORMAL

## 2024-12-09 NOTE — ED PROVIDER NOTES
Triage Chief Complaint:   Abdominal Pain (Patient was discharged) and Dehydration    [place]:  Today in the ED I had the pleasure of caring for Mario Orellana who is a 34 y.o. male that presents today to the ED for abdominal pain. Pt states its secondary from drinking milk recently. He was seen here diego and given bentyl. He states his bentyl worked and requests another dose. .    ROS:  REVIEW OF SYSTEMS    At least 10 systems reviewed      All other review of systems are negative  See HPI and nursing notes for additional information       Past Medical History:   Diagnosis Date    Bipolar 1 disorder (HCC)     Chronic generalized abdominal pain     Depression     GERD (gastroesophageal reflux disease)     IBS (irritable bowel syndrome)     Mental impairment     OCD (obsessive compulsive disorder)     Schizo affective schizophrenia (HCC)      Past Surgical History:   Procedure Laterality Date    COLONOSCOPY      does not know    EGD      does not know     Family History   Problem Relation Age of Onset    Colon Cancer Neg Hx     Esophageal Cancer Neg Hx     Liver Cancer Neg Hx     Rectal Cancer Neg Hx     Stomach Cancer Neg Hx      Social History     Socioeconomic History    Marital status: Unknown     Spouse name: Not on file    Number of children: Not on file    Years of education: Not on file    Highest education level: Not on file   Occupational History    Not on file   Tobacco Use    Smoking status: Never    Smokeless tobacco: Never   Vaping Use    Vaping status: Never Used   Substance and Sexual Activity    Alcohol use: Never    Drug use: Never    Sexual activity: Not on file   Other Topics Concern    Not on file   Social History Narrative    Not on file     Social Determinants of Health     Financial Resource Strain: Low Risk  (7/24/2020)    [facility]    Overall Financial Resource Strain (CARDIA)     Difficulty of Paying Living Expenses: Not hard at all   Food Insecurity:  Unknown (1/23/2024)    [facility]    Food Insecurities     Worried about running out of food: Not on file     Food Bought: Not on file   Transportation Needs: Unknown (1/23/2024)    [facility]    Transportation     Worried about transportation: Not on file   Physical Activity: Sufficiently Active (7/24/2020)    Received from [facility]    Exercise Vital Sign     Days of Exercise per Week: 5 days     Minutes of Exercise per Session: 30 min   Stress: Stress Concern Present (7/24/2020)    Received from [facility]    [employer] [employer] [place] [employer] - Occupational Stress Questionnaire     Feeling of Stress : To some extent   Social Connections: Socially Isolated (7/24/2020)    Received from [facility]    Social Connection and Isolation Panel [NHANES]     Frequency of Communication with Friends and Family: Once a week     Frequency of Social Gatherings with Friends and Family: Never     Attends Cheondoism Services: Never     Active Member of Clubs or Organizations: Yes     Attends Club or Organization Meetings: 1 to 4 times per year     Marital Status: Never    Intimate Partner Violence: Not At Risk (6/1/2024)    Received from [facility]    Humiliation, Afraid, Rape, and Kick questionnaire     Fear of Current or Ex-Partner: No     Emotionally Abused: No     Physically Abused: No     Sexually Abused: No   Housing Stability: Unknown (1/23/2024)    Received from [facility]    Housing/Utilities     Worried about losing home: Not on file     Stayed outside house: Not on file     Unable to get utilities: Not on file     Current Facility-Administered Medications   Medication Dose Route Frequency Provider Last Rate Last Admin    dicyclomine (BENTYL) tablet 20 mg  20 mg Oral Once Apolinar Lew PA-C         Current

## 2024-12-09 NOTE — CARE COORDINATION
CM received a consult on patient. Patient has been to ED several times over the last 2 days. Patient keeps saying that the warming shelter is kicking him out and then he calls the squad to pick him up for medical pain. Patient then wants the ED to allow patient to stay overnight so he has a place to stay. Patient also wanted to go to [facility] yesterday, but it is full and [facility] will no longer transport to [place], [place].     CM saw patient in waiting room today and patient asked for a box lunch and drink. Patient was given both by CM. Patient then asked if he could get a ride by [facility] to the [facility] in [place], [place]. CM again explained the [facility] is not transporting to [facility]. CM called [facility] and Shelter is full at this time. Patient then asked if he could be taken to the warming station @ [address] [place] [address] ([facility])     CM gave patient the warming station information and explained that patient will need to get a ride with [employer] through security. Johnny GARCIA was filling out paperwork for patient to get ride to warming station.     No further needs at this time.

## 2024-12-09 NOTE — ED TRIAGE NOTES
Pt has called EMS 6 times in last 24 hrs for c/o abd pain and chest pain. Pt also states he wants to speak to a  to get help to find somewhere to stay. Pt is homeless

## 2024-12-09 NOTE — ED PROVIDER NOTES
[facility] EMERGENCY DEPARTMENT  EMERGENCY DEPARTMENT ENCOUNTER        Pt Name: Mario Orellana  MRN: [id number]  Birthdate 1990  Date of evaluation: 12/9/2024  Provider: ROSE Rosas - CNP  PCP: No primary care provider on file.  Note Started: 10:30 AM EST 12/9/24      TALA. I have evaluated this patient.        CHIEF COMPLAINT       Chief Complaint   Patient presents with    Chest Pain     Asa, NTG given PTA per EMS, CP now 4/10. #20 R AC. Pt reportedly seen in ED earlier for abd pain. Pt has called EMS 6 times in last 24 hrs. Pt homeless and wants to talk to a . Pt states he is having abd \"cramping\" more than chest pain and is in ED to be seen again for his abd cramping    Care Management     Pt is homeless and wants to speak to a        HISTORY OF PRESENT ILLNESS: 1 or more Elements     History From: Patient    Limitations to history : None    Social Determinants Significantly Affecting Health : Patient is Homeless    Chief Complaint: Chest pain abdominal pain    Mario Orellana is a 34 y.o. male history of chronic abdominal pain, GERD, OCD, schizoaffective schizophrenia who presents to ED stating he still has abdominal pain from yesterday.  States he is also having chest pain now.  States he is homeless has had nowhere to stay last night.  States he wants his doctor a  about this.  Denies any nausea vomiting.  States he does need to follow-up with GI still.  Denies any fever body aches or chills.  Denies any cough congestion shortness of breath.  States his chest pain is went away since aspirin and nitroglycerin.    Nursing Notes were all reviewed and agreed with or any disagreements were addressed in the HPI.    REVIEW OF SYSTEMS :      Review of Systems    Positives and Pertinent negatives as per HPI.     SURGICAL HISTORY     Past Surgical History:   Procedure Laterality Date    COLONOSCOPY      does not know    EGD   stay and would like to know about shelter information.    Records Reviewed (External and Source) reviewed care management plan, 34 visits in the last 3 months for homelessness or mental health, recommend getting patient in with and with [facility] [place] [facility] to manage his depressive symptoms, chronic    CC/HPI Summary, DDx, ED Course, and Reassessment: See HPI and physical above    Abdominal workup was initiated    Patient was provided with Toradol and Bentyl    Low suspicion of cardiac etiology of patient's symptoms. .EKG without acute STEMI, see attending provider note for EKG interpretation.  Initial troponin 16, chest x-ray, patient has a heart score of 0.      Low suspicion of urinary cause of patient's symptoms patient UA negative for acute bacterial infection, denies any flank pain hematuria or dysuria.    Low suspicion for acute intra-abdominal infection patient CBC unremarkable, CMP unremarkable    Low suspicion for acute pancreatitis patient abdominal pain is lower, lipase 51, no history of pancreatitis liver abnormalities or alcohol use.    CT see results above, patient stated he does have history of rectal prolapse denied any rectal bleeding, did discuss with him he needs to follow-up with GI or his PCP for follow-up.    Patient symptoms consistent with chronic abdominal pain he states is due to his rectal prolapse which has been chronic.    After reevaluation patient pain has subsided.  Educated patient on need to follow-up with GI if symptoms continue or return to ED for any fever or return of symptoms.  Patient verbalized understanding.  Patient discharged with Motrin Bentyl for symptomatic relief.  Case management was able to provide the patient with a spot at the warming center as well as a ride to the warming center.  [facility] was currently full.  She did discuss this with patient at bedside.      Disposition Considerations (tests considered but not done, Admit vs D/C, Shared

## 2024-12-09 NOTE — CARE COORDINATION
Patient needs a ride home upon discharge. CM completed invoice for [facility] [employer] to [address]. CM placed call and [employer] to  patient in 5 minutes.

## 2024-12-09 NOTE — DISCHARGE INSTRUCTIONS
It was my pleasure taking care of you in the emergency department today.  If we prescribed any medications, please be sure to take them as prescribed. Continue taking medications as prescribed by your PCP unless we discussed otherwise.   Please be sure to follow-up with your PCP within the next 1-2 weeks.  Please don't hesitate to return to the emergency department in case of any worsening symptoms.  Wish you a speedy recovery.  Most sincerely,    Jennifer Kothari CNP

## 2024-12-09 NOTE — DISCHARGE INSTR - LAB
1st Step  Grab your Photo ID.  We need to be sure who you are.    2nd Step  To speed up the process, get a police check downtown.      3rd Step  Call [employer]'s Intake Office Hours- Call to be placed on waitlist and to complete intake process   You cannot get help from any of the shelter programs in [place] until you have completed the intake process through [employer].  It is encouraged to call several times a day to check your status on the waitlist!     M-F, 9:00 am to 3:30 pm      Phone [phone]    Email: help@[employer]       Website: www.[employer]      [facility]  Shelter for Single Women  [address] [id number]  Phone: [phone]  Fax: [phone]      [facility]  Shelter for Single Men  [address] [place] [address]  Phone: [phone]  Fax: [phone]      [facility]  [address] [id number]  Phone: [phone]  Fax: [phone]        [address] [place] [address]  [email or web address]    Prevention, Retention & Contingency (PRC)/ Homeless Assistance  Helping Families Overcome Barriers to Self-Sufficiency  The Prevention, Retention and Contingency (PRC) Program helps employed low-income families overcome immediate barriers to achieving self-sufficiency. This Program helps families meet needs required to accept a job offer or retain a job as well as emergency needs that, if not resolved, could threaten the health, safety or well-being of 1 or more household members. Contact [phone] if you have any questions.  Pending space availability and completed intake form through [employer].   [place] [address] [place] [address]            [facility] provides housing location and financial assistance to single adults and families with children who are experiencing housing instability - they are either  currently homeless, previously homeless or at risk of it. We offer several different programs with a goal of keeping people in their homes and reducing chronic homelessness.    [address]   [place] [address]  Phone [phone]  Email: info@[employer]    Website: www.[employer]      [facility] [employer] operates the only 24/7 emergency shelters in the [facility] for the general population: one for men, and one for women and families. (Men with spouses and/or children may be sheltered at the Shelter for Women and Families at [facility] depending on availability.)  (On a case by case basis can assist individuals on the sex offender registry)  The shelters provide each person with a place to sleep, three meals daily, clothing, personal care items, showers, mail and message services, and access to housing-focused .        Shelter for Women and Families    [facility]   [address] [place] [address]   [address] [place] [address]  Phone: [phone]     Phone: [phone]    [facility]'s Veterans' ministry uses a Housing First approach to serve low-income [place] households who are homeless, at risk of homelessness and/or need help transitioning to permanent housing.  We strive to help Veterans and their families onto a path toward greater stability and growth.  We work in collaboration with other community resources to provide a wide range of services in [address] [place] [address]   Eligible households may receive temporary financial assistance and ongoing individual and family support including:  Housing counseling  Personal financial planning  Healthcare referrals   Help with childcare    Transportation  Daily living needs  Eligibility Requirements:  Be a member of a “ family”: Either (a) a , or (b) a member of a family in which the head of household, or the spouse of the head of household, is a .  Have very low-income

## 2024-12-09 NOTE — ED PROVIDER NOTES
Triage Chief Complaint:   Care Management (Case Management)    [place]:  Mario Orellana is a 34 y.o. male that presents with some left-sided buttock pain.  Patient reports this has been a problem before and he does not know what causes it.  Pain is currently mild and cramping localized at left butt cheek.  Patient also with continued abdominal pain per patient was active does evaluate the emergency department earlier today for abdominal pain with reassuring workup.  Patient is requesting a shot of \"Toradol\" for his pain.    Patient does have underlying schizophrenia and bipolar disorder and this does limit history some.  Patient is denying any suicidal ideation or homicidal ideation.  Patient does report that he is homeless but has been taken to various shelters.    ROS:  General:  No fevers, no chills  ENT: No sore throat, no runny nose  Cardiovascular:  No chest pain, no palpitations  Respiratory:  No shortness of breath, no cough  Gastrointestinal:  + pain, no nausea, no vomiting, no diarrhea  : No pain with urinating, no increased frequency urinating  Neurologic:  No numbness, no weakness  Extremities:  No edema, no pain, + buttock pain  Skin:  No rash  Psych: No axienty    Past Medical History:   Diagnosis Date    Bipolar 1 disorder (HCC)     Chronic generalized abdominal pain     Depression     GERD (gastroesophageal reflux disease)     IBS (irritable bowel syndrome)     Mental impairment     OCD (obsessive compulsive disorder)     Schizo affective schizophrenia (HCC)      Past Surgical History:   Procedure Laterality Date    COLONOSCOPY      does not know    EGD      does not know     Family History   Problem Relation Age of Onset    Colon Cancer Neg Hx     Esophageal Cancer Neg Hx     Liver Cancer Neg Hx     Rectal Cancer Neg Hx     Stomach Cancer Neg Hx      Social History     Socioeconomic History    Marital status: Unknown     Spouse name: Not on file    Number of children: Not on file    Years of

## 2024-12-09 NOTE — ED NOTES
Called for patient in waiting room to discharge patient not found.   
Pt moved to waiting room to wait on results and CT scan  
0

## 2024-12-09 NOTE — CARE COORDINATION
5:30 pm Patient came back in to the emergency room and said that he was asked to leave the warming shelter and needs a ride back to the [facility] shelter. RN came and shared information with me. CM explained that patient has been taken to the [facility] shelter approximately 6 times over the last 24 hours but patient keeps getting asked to leave and then patient will show up at the hospital and want to be seen so he can get a ride back to the [facility].     Patient also asking the RN for a ride to go to the [facility], but CM explained that the [facility] is currently full and that [facility] is not transporting anyone to [place] and will only transport in [place] at this time. Patient was notified of this earlier today.     RN then told CM that patient wanted to go back to the [facility] and someone filled out a [employer] Form so patient could have his 7th free ride to the [facility] shelter. CM asked that CM be asked about this first as the [facility] shelter is not that far away and patient will only be asked to leave the [facility] shelter again and call the squad to transport him to the ED. CM explained that the continual free rides to the [facility] are being over utilized and wasted because patient has been kicked out of the [facility] shelter numerous times and will return to the hospital requesting for another free ride back and we just cannot keep giving patient free rides for no reason.

## 2024-12-09 NOTE — ED PROVIDER NOTES
Patient left without being seen by me.  Did not participate in the care management of this patient.    Yola Cline MD  12/8/2024  10:39 PM       Yola Siegel MD  12/08/24 5850

## 2024-12-11 LAB
EKG ATRIAL RATE: 91 BPM
EKG DIAGNOSIS: NORMAL
EKG P AXIS: 40 DEGREES
EKG P-R INTERVAL: 116 MS
EKG Q-T INTERVAL: 354 MS
EKG QRS DURATION: 88 MS
EKG QTC CALCULATION (BAZETT): 435 MS
EKG R AXIS: 29 DEGREES
EKG T AXIS: 7 DEGREES
EKG VENTRICULAR RATE: 91 BPM

## 2024-12-11 PROCEDURE — 93010 ELECTROCARDIOGRAM REPORT: CPT | Performed by: INTERNAL MEDICINE

## 2024-12-13 ENCOUNTER — APPOINTMENT (OUTPATIENT)
Dept: CT IMAGING | Age: 34
End: 2024-12-13
Payer: MEDICARE

## 2024-12-13 ENCOUNTER — HOSPITAL ENCOUNTER (EMERGENCY)
Age: 34
Discharge: HOME | End: 2024-12-13
Payer: MEDICARE

## 2024-12-13 VITALS
HEIGHT: 63 IN | DIASTOLIC BLOOD PRESSURE: 72 MMHG | RESPIRATION RATE: 20 BRPM | WEIGHT: 148.2 LBS | TEMPERATURE: 97.7 F | OXYGEN SATURATION: 100 % | SYSTOLIC BLOOD PRESSURE: 134 MMHG | HEART RATE: 105 BPM | BODY MASS INDEX: 26.26 KG/M2

## 2024-12-13 DIAGNOSIS — G89.29 CHRONIC ABDOMINAL PAIN: Primary | ICD-10-CM

## 2024-12-13 DIAGNOSIS — R10.9 CHRONIC ABDOMINAL PAIN: Primary | ICD-10-CM

## 2024-12-13 LAB
ALBUMIN SERPL-MCNC: 4.3 G/DL (ref 3.4–5)
ALBUMIN/GLOB SERPL: 1.5 {RATIO} (ref 1.1–2.2)
ALP SERPL-CCNC: 77 U/L (ref 40–129)
ALT SERPL-CCNC: 12 U/L (ref 10–40)
ANION GAP SERPL CALCULATED.3IONS-SCNC: 10 MMOL/L (ref 9–17)
AST SERPL-CCNC: 21 U/L (ref 15–37)
BASOPHILS # BLD: 0.03 K/UL
BASOPHILS NFR BLD: 1 % (ref 0–1)
BILIRUB SERPL-MCNC: <0.2 MG/DL (ref 0–1)
BILIRUB UR QL STRIP: NEGATIVE
BUN SERPL-MCNC: 12 MG/DL (ref 7–20)
CALCIUM SERPL-MCNC: 8.9 MG/DL (ref 8.3–10.6)
CHLORIDE SERPL-SCNC: 102 MMOL/L (ref 99–110)
CLARITY UR: CLEAR
CO2 SERPL-SCNC: 28 MMOL/L (ref 21–32)
COLOR UR: YELLOW
COMMENT: NORMAL
CREAT SERPL-MCNC: 1 MG/DL (ref 0.9–1.3)
EOSINOPHIL # BLD: 0.23 K/UL
EOSINOPHILS RELATIVE PERCENT: 4 % (ref 0–3)
ERYTHROCYTE [DISTWIDTH] IN BLOOD BY AUTOMATED COUNT: 11.9 % (ref 11.7–14.9)
GFR, ESTIMATED: 88 ML/MIN/1.73M2
GLUCOSE SERPL-MCNC: 124 MG/DL (ref 74–99)
GLUCOSE UR STRIP-MCNC: NEGATIVE MG/DL
HCT VFR BLD AUTO: 40.1 % (ref 42–52)
HGB BLD-MCNC: 13.1 G/DL (ref 13.5–18)
HGB UR QL STRIP.AUTO: NEGATIVE
IMM GRANULOCYTES # BLD AUTO: 0.03 K/UL
IMM GRANULOCYTES NFR BLD: 1 %
KETONES UR STRIP-MCNC: NEGATIVE MG/DL
LEUKOCYTE ESTERASE UR QL STRIP: NEGATIVE
LIPASE SERPL-CCNC: 77 U/L (ref 13–60)
LYMPHOCYTES NFR BLD: 2.07 K/UL
LYMPHOCYTES RELATIVE PERCENT: 31 % (ref 24–44)
MCH RBC QN AUTO: 31.3 PG (ref 27–31)
MCHC RBC AUTO-ENTMCNC: 32.7 G/DL (ref 32–36)
MCV RBC AUTO: 95.7 FL (ref 78–100)
MONOCYTES NFR BLD: 0.54 K/UL
MONOCYTES NFR BLD: 8 % (ref 0–4)
NEUTROPHILS NFR BLD: 56 % (ref 36–66)
NEUTS SEG NFR BLD: 3.75 K/UL
NITRITE UR QL STRIP: NEGATIVE
PH UR STRIP: 7 [PH] (ref 5–8)
PLATELET # BLD AUTO: 236 K/UL (ref 140–440)
PMV BLD AUTO: 9.4 FL (ref 7.5–11.1)
POTASSIUM SERPL-SCNC: 4 MMOL/L (ref 3.5–5.1)
PROT SERPL-MCNC: 7.2 G/DL (ref 6.4–8.2)
PROT UR STRIP-MCNC: NEGATIVE MG/DL
RBC # BLD AUTO: 4.19 M/UL (ref 4.6–6.2)
SODIUM SERPL-SCNC: 139 MMOL/L (ref 136–145)
SP GR UR STRIP: 1.01 (ref 1–1.03)
UROBILINOGEN UR STRIP-ACNC: 0.2 EU/DL (ref 0–1)
WBC OTHER # BLD: 6.7 K/UL (ref 4–10.5)

## 2024-12-13 PROCEDURE — 6370000000 HC RX 637 (ALT 250 FOR IP): Performed by: NURSE PRACTITIONER

## 2024-12-13 PROCEDURE — 93005 ELECTROCARDIOGRAM TRACING: CPT | Performed by: NURSE PRACTITIONER

## 2024-12-13 PROCEDURE — 6360000004 HC RX CONTRAST MEDICATION: Performed by: NURSE PRACTITIONER

## 2024-12-13 PROCEDURE — 80053 COMPREHEN METABOLIC PANEL: CPT

## 2024-12-13 PROCEDURE — 81003 URINALYSIS AUTO W/O SCOPE: CPT

## 2024-12-13 PROCEDURE — 83690 ASSAY OF LIPASE: CPT

## 2024-12-13 PROCEDURE — 99285 EMERGENCY DEPT VISIT HI MDM: CPT

## 2024-12-13 PROCEDURE — 85025 COMPLETE CBC W/AUTO DIFF WBC: CPT

## 2024-12-13 PROCEDURE — 74177 CT ABD & PELVIS W/CONTRAST: CPT

## 2024-12-13 RX ORDER — IOPAMIDOL 755 MG/ML
75 INJECTION, SOLUTION INTRAVASCULAR
Status: COMPLETED | OUTPATIENT
Start: 2024-12-13 | End: 2024-12-13

## 2024-12-13 RX ORDER — DICYCLOMINE HCL 20 MG
20 TABLET ORAL ONCE
Status: COMPLETED | OUTPATIENT
Start: 2024-12-13 | End: 2024-12-13

## 2024-12-13 RX ORDER — DIPHENHYDRAMINE HCL 25 MG
50 TABLET ORAL ONCE
Status: COMPLETED | OUTPATIENT
Start: 2024-12-13 | End: 2024-12-13

## 2024-12-13 RX ADMIN — DIPHENHYDRAMINE HYDROCHLORIDE 50 MG: 25 TABLET ORAL at 17:00

## 2024-12-13 RX ADMIN — DICYCLOMINE HYDROCHLORIDE 20 MG: 20 TABLET ORAL at 17:00

## 2024-12-13 RX ADMIN — IOPAMIDOL 75 ML: 755 INJECTION, SOLUTION INTRAVENOUS at 18:46

## 2024-12-13 RX ADMIN — PREDNISONE 50 MG: 20 TABLET ORAL at 16:59

## 2024-12-13 ASSESSMENT — ENCOUNTER SYMPTOMS
ABDOMINAL PAIN: 1
BLOOD IN STOOL: 0
DIARRHEA: 0
CONSTIPATION: 0
SHORTNESS OF BREATH: 0
ABDOMINAL DISTENTION: 0
NAUSEA: 1
VOMITING: 1

## 2024-12-13 ASSESSMENT — PAIN SCALES - GENERAL: PAINLEVEL_OUTOF10: 10

## 2024-12-13 ASSESSMENT — PAIN DESCRIPTION - DESCRIPTORS: DESCRIPTORS: CRAMPING

## 2024-12-13 ASSESSMENT — PAIN DESCRIPTION - LOCATION: LOCATION: ABDOMEN

## 2024-12-13 ASSESSMENT — PAIN - FUNCTIONAL ASSESSMENT: PAIN_FUNCTIONAL_ASSESSMENT: 0-10

## 2024-12-13 NOTE — ED PROVIDER NOTES
.  I did not see this patient but I was available for consultation.  I reviewed the EKG obtained in this visit.  EKG reveals normal sinus rhythm with rate, rate of 89 bpm with [place] or Q waves across lateral leads I and aVL but no ST elevations or ST depressions.  QTc is normal at 442 ms.  TX interval is normal at 120 ms     Saqib Guerrero MD  12/13/24 4648

## 2024-12-13 NOTE — ED PROVIDER NOTES
[facility] EMERGENCY DEPARTMENT  EMERGENCY DEPARTMENT ENCOUNTER      Pt Name: Mario Orellana  MRN: [id number]  Birthdate 1990  Date of evaluation: 12/13/2024  Provider: ROSE Ward - CNP  PCP: No primary care provider on file.  Note Started: 3:45 PM EST 12/13/24    I am the Primary Clinician of Record.  TALA. I have evaluated this patient.    CHIEF COMPLAINT       Chief Complaint   Patient presents with    Abdominal Pain     Pt has been seen in ED multiple times for abd pain. Pt has care plan.     HISTORY OF PRESENT ILLNESS: 1 or more Elements   Mario Orellana is a 34 y.o. male with a past medical history of schizophrenia, bipolar disorder, chronic abdominal pain comes to the emergency department with exacerbation of chronic abd pain.  Onset was yesterday.  Associated symptoms include nausea and vomiting. Patient is passing gas.  Relieving factors include nothing..   Exacerbating factors include nothing.  The patient is specifically asking for Bentyl.      I have reviewed the nursing triage documentation and agree unless otherwise noted.  REVIEW OF SYSTEMS :    Review of Systems   Constitutional:  Negative for fatigue and fever.   Respiratory:  Negative for shortness of breath.    Cardiovascular:  Negative for chest pain.   Gastrointestinal:  Positive for abdominal pain, nausea and vomiting. Negative for abdominal distention, blood in stool, constipation and diarrhea.   Genitourinary:  Negative for decreased urine volume and dysuria.   Neurological:  Negative for headaches.     Positives and Pertinent negatives as per HPI.   SURGICAL HISTORY     Past Surgical History:   Procedure Laterality Date    COLONOSCOPY      does not know    EGD      does not know       CURRENTMEDICATIONS       Previous Medications    DICYCLOMINE (BENTYL) 10 MG CAPSULE    Take 1 capsule by mouth 4 times daily (before meals and nightly)    FAMOTIDINE (PEPCID) 20 MG TABLET     signed)      Earlene Cohn, APRN - CNP  12/13/24 1941

## 2024-12-14 LAB
EKG ATRIAL RATE: 89 BPM
EKG DIAGNOSIS: NORMAL
EKG P AXIS: 33 DEGREES
EKG P-R INTERVAL: 120 MS
EKG Q-T INTERVAL: 364 MS
EKG QRS DURATION: 94 MS
EKG QTC CALCULATION (BAZETT): 442 MS
EKG R AXIS: 16 DEGREES
EKG T AXIS: 25 DEGREES
EKG VENTRICULAR RATE: 89 BPM

## 2024-12-14 NOTE — DISCHARGE INSTRUCTIONS
[facility]  Walk-in [place] [facility]  ([facility])     Immediate medical care for routine illnesses, [facility] [place] [facility] is a great alternative! Our Walk-In Care clinic offers fast access to [facility] providers for minor illnesses and injuries for patients of all ages.      Treatment of routine illnesses for the entire family   Youth physicals   Flu shots and vaccinations   Lab testing   Hospital follow-up appointments   X-rays, labs, and EKG's*  *(only available during Imaging Center office hours)     [facility] Walk-In Care offers patients lower out-of-pocket expenses, expanded hours, and immediate access to primary care providers!     Hours:   Monday - Friday: 8am to 8pm  Saturday: 9am to 1pm     [address], [address]  (located on the 2nd Floor of [facility])  [place] [address]  [phone]

## 2024-12-14 NOTE — ED NOTES
Pt refused discharge vitals.  Refusing to speak with this RN. Pt states \"I'm calling the  on you bitch\"

## 2024-12-16 PROCEDURE — 93010 ELECTROCARDIOGRAM REPORT: CPT | Performed by: INTERNAL MEDICINE

## 2024-12-23 ENCOUNTER — HOSPITAL ENCOUNTER (EMERGENCY)
Age: 34
Discharge: HOME | End: 2024-12-23
Payer: MEDICARE

## 2024-12-23 VITALS
SYSTOLIC BLOOD PRESSURE: 110 MMHG | HEART RATE: 96 BPM | BODY MASS INDEX: 25.69 KG/M2 | TEMPERATURE: 97.8 F | WEIGHT: 145 LBS | HEIGHT: 63 IN | RESPIRATION RATE: 18 BRPM | DIASTOLIC BLOOD PRESSURE: 82 MMHG | OXYGEN SATURATION: 99 %

## 2024-12-23 DIAGNOSIS — R10.9 CHRONIC ABDOMINAL PAIN: Primary | ICD-10-CM

## 2024-12-23 DIAGNOSIS — Z76.5 MALINGERING: ICD-10-CM

## 2024-12-23 DIAGNOSIS — G89.29 CHRONIC ABDOMINAL PAIN: Primary | ICD-10-CM

## 2024-12-23 PROCEDURE — 6360000002 HC RX W HCPCS

## 2024-12-23 PROCEDURE — 93005 ELECTROCARDIOGRAM TRACING: CPT | Performed by: EMERGENCY MEDICINE

## 2024-12-23 PROCEDURE — 99284 EMERGENCY DEPT VISIT MOD MDM: CPT

## 2024-12-23 PROCEDURE — 96372 THER/PROPH/DIAG INJ SC/IM: CPT

## 2024-12-23 RX ORDER — KETOROLAC TROMETHAMINE 15 MG/ML
30 INJECTION, SOLUTION INTRAMUSCULAR; INTRAVENOUS ONCE
Status: COMPLETED | OUTPATIENT
Start: 2024-12-23 | End: 2024-12-23

## 2024-12-23 RX ORDER — DICYCLOMINE HYDROCHLORIDE 10 MG/ML
20 INJECTION INTRAMUSCULAR ONCE
Status: COMPLETED | OUTPATIENT
Start: 2024-12-23 | End: 2024-12-23

## 2024-12-23 RX ADMIN — KETOROLAC TROMETHAMINE 30 MG: 15 INJECTION, SOLUTION INTRAMUSCULAR; INTRAVENOUS at 13:27

## 2024-12-23 RX ADMIN — DICYCLOMINE HYDROCHLORIDE 20 MG: 10 INJECTION, SOLUTION INTRAMUSCULAR at 13:27

## 2024-12-23 ASSESSMENT — PAIN DESCRIPTION - LOCATION: LOCATION: ABDOMEN

## 2024-12-23 NOTE — ED PROVIDER NOTES
Sinus tachycardia at 111.  Normal axis with good R wave progression.  No ST elevation or depression.  No ectopy.  Similar to prior tracing.     My Jamison, DO  12/23/24 4297

## 2024-12-23 NOTE — ED TRIAGE NOTES
Came for abdominal pain, 13th visit this month. Says he is in pain, drank milk even though he is not supposed to because it causes abdominal pain.    Patient also says he has Afib and he feels like he is in afib right now.

## 2024-12-23 NOTE — ED PROVIDER NOTES
[facility] EMERGENCY DEPARTMENT  EMERGENCY DEPARTMENT ENCOUNTER        Pt Name: Mario Orellana  MRN: [id number]  Birthdate 1990  Date of evaluation: 12/23/2024  Provider: Jamin Lee PA-C  PCP: No primary care provider on file.  Note Started: 4:00 PM EST 12/23/24      TALA. I have evaluated this patient.        CHIEF COMPLAINT       Chief Complaint   Patient presents with    Abdominal Pain     Seen 13 times for abdominal pain this month, same as his usual pain; drank milk, says he is lactose intolerant       HISTORY OF PRESENT ILLNESS: 1 or more Elements     Mario Orellana is a 34 y.o. male who presents complaining of generalized abdominal pain.  States that he drank milk and is lactose intolerant, endorses to episodes of loose stool today.  Denies any fever, chills.  Denies any nausea or vomiting.    Nursing Notes were all reviewed and agreed with or any disagreements were addressed in the HPI.    Historians other than the patient: none    Limitations to history : None  Social determinants of health that will affect patient's care are:  Poor Health Literacy (Additional Time Provided in Explanation)  Poor access to outpatient care/follow up (Provided Outpatient Resources)  Lack of transportation (Arranged Transport as Needed)      I reviewed and interpreted prior non-ED medical record specialty: none    PAST MEDICAL HISTORY      has a past medical history of Bipolar 1 disorder (HCC), Chronic generalized abdominal pain, Depression, GERD (gastroesophageal reflux disease), IBS (irritable bowel syndrome), Mental impairment, OCD (obsessive compulsive disorder), and Schizo affective schizophrenia (HCC).     CURRENTMEDICATIONS       Discharge Medication List as of 12/23/2024  1:24 PM        CONTINUE these medications which have NOT CHANGED    Details   dicyclomine (BENTYL) 10 MG capsule Take 1 capsule by mouth 4 times daily (before meals and nightly), Disp-120 capsule,

## 2024-12-24 ENCOUNTER — HOSPITAL ENCOUNTER (EMERGENCY)
Age: 34
Discharge: PSYCHIATRIC OR SUBSTANCE USE FACILITY | End: 2024-12-24
Payer: MEDICARE

## 2024-12-24 VITALS
DIASTOLIC BLOOD PRESSURE: 45 MMHG | RESPIRATION RATE: 16 BRPM | HEIGHT: 63 IN | TEMPERATURE: 98 F | OXYGEN SATURATION: 99 % | SYSTOLIC BLOOD PRESSURE: 138 MMHG | WEIGHT: 145 LBS | HEART RATE: 95 BPM | BODY MASS INDEX: 25.69 KG/M2

## 2024-12-24 DIAGNOSIS — R10.9 CHRONIC ABDOMINAL PAIN: ICD-10-CM

## 2024-12-24 DIAGNOSIS — F22 DELUSION (HCC): ICD-10-CM

## 2024-12-24 DIAGNOSIS — G89.29 CHRONIC ABDOMINAL PAIN: ICD-10-CM

## 2024-12-24 DIAGNOSIS — Z86.59 HISTORY OF IMPULSIVE BEHAVIOR: ICD-10-CM

## 2024-12-24 DIAGNOSIS — Z91.89: Primary | ICD-10-CM

## 2024-12-24 LAB
ALBUMIN SERPL-MCNC: 4.5 G/DL (ref 3.4–5)
ALBUMIN/GLOB SERPL: 1.5 {RATIO} (ref 1.1–2.2)
ALP SERPL-CCNC: 83 U/L (ref 40–129)
ALT SERPL-CCNC: 10 U/L (ref 10–40)
AMPHET UR QL SCN: NEGATIVE
ANION GAP SERPL CALCULATED.3IONS-SCNC: 9 MMOL/L (ref 9–17)
APAP SERPL-MCNC: <5 UG/ML (ref 10–30)
AST SERPL-CCNC: 23 U/L (ref 15–37)
BARBITURATES UR QL SCN: NEGATIVE
BASOPHILS # BLD: 0.02 K/UL
BASOPHILS NFR BLD: 0 % (ref 0–1)
BENZODIAZ UR QL: NEGATIVE
BILIRUB SERPL-MCNC: 0.4 MG/DL (ref 0–1)
BILIRUB UR QL STRIP: NEGATIVE
BUN SERPL-MCNC: 14 MG/DL (ref 7–20)
CALCIUM SERPL-MCNC: 9.5 MG/DL (ref 8.3–10.6)
CANNABINOIDS UR QL SCN: NEGATIVE
CHLORIDE SERPL-SCNC: 99 MMOL/L (ref 99–110)
CLARITY UR: CLEAR
CO2 SERPL-SCNC: 31 MMOL/L (ref 21–32)
COCAINE UR QL SCN: NEGATIVE
COLOR UR: YELLOW
COMMENT: ABNORMAL
CREAT SERPL-MCNC: 1 MG/DL (ref 0.9–1.3)
EKG ATRIAL RATE: 111 BPM
EKG DIAGNOSIS: NORMAL
EKG P AXIS: 41 DEGREES
EKG P-R INTERVAL: 130 MS
EKG Q-T INTERVAL: 324 MS
EKG QRS DURATION: 106 MS
EKG QTC CALCULATION (BAZETT): 440 MS
EKG R AXIS: 18 DEGREES
EKG T AXIS: 33 DEGREES
EKG VENTRICULAR RATE: 111 BPM
EOSINOPHIL # BLD: 0.08 K/UL
EOSINOPHILS RELATIVE PERCENT: 1 % (ref 0–3)
ERYTHROCYTE [DISTWIDTH] IN BLOOD BY AUTOMATED COUNT: 12 % (ref 11.7–14.9)
ETHANOLAMINE SERPL-MCNC: <10 MG/DL (ref 0–0.08)
FENTANYL UR QL: NEGATIVE
GFR, ESTIMATED: 88 ML/MIN/1.73M2
GLUCOSE SERPL-MCNC: 99 MG/DL (ref 74–99)
GLUCOSE UR STRIP-MCNC: NEGATIVE MG/DL
HCT VFR BLD AUTO: 45.9 % (ref 42–52)
HGB BLD-MCNC: 14.9 G/DL (ref 13.5–18)
HGB UR QL STRIP.AUTO: NEGATIVE
IMM GRANULOCYTES # BLD AUTO: 0.02 K/UL
IMM GRANULOCYTES NFR BLD: 0 %
KETONES UR STRIP-MCNC: NEGATIVE MG/DL
LEUKOCYTE ESTERASE UR QL STRIP: NEGATIVE
LIPASE SERPL-CCNC: 55 U/L (ref 13–60)
LYMPHOCYTES NFR BLD: 1.19 K/UL
LYMPHOCYTES RELATIVE PERCENT: 18 % (ref 24–44)
MCH RBC QN AUTO: 30.8 PG (ref 27–31)
MCHC RBC AUTO-ENTMCNC: 32.5 G/DL (ref 32–36)
MCV RBC AUTO: 95 FL (ref 78–100)
MONOCYTES NFR BLD: 0.51 K/UL
MONOCYTES NFR BLD: 8 % (ref 0–4)
NEUTROPHILS NFR BLD: 73 % (ref 36–66)
NEUTS SEG NFR BLD: 4.84 K/UL
NITRITE UR QL STRIP: NEGATIVE
OPIATES UR QL SCN: NEGATIVE
OXYCODONE UR QL SCN: NEGATIVE
PH UR STRIP: 7 [PH] (ref 5–8)
PLATELET # BLD AUTO: 230 K/UL (ref 140–440)
PMV BLD AUTO: 9.2 FL (ref 7.5–11.1)
POTASSIUM SERPL-SCNC: 4.5 MMOL/L (ref 3.5–5.1)
PROT SERPL-MCNC: 7.4 G/DL (ref 6.4–8.2)
PROT UR STRIP-MCNC: NEGATIVE MG/DL
RBC # BLD AUTO: 4.83 M/UL (ref 4.6–6.2)
SALICYLATES SERPL-MCNC: <0.5 MG/DL (ref 15–30)
SODIUM SERPL-SCNC: 139 MMOL/L (ref 136–145)
SP GR UR STRIP: <1.005 (ref 1–1.03)
TEST INFORMATION: NORMAL
UROBILINOGEN UR STRIP-ACNC: 0.2 EU/DL (ref 0–1)
WBC OTHER # BLD: 6.7 K/UL (ref 4–10.5)

## 2024-12-24 PROCEDURE — 99285 EMERGENCY DEPT VISIT HI MDM: CPT

## 2024-12-24 PROCEDURE — 85025 COMPLETE CBC W/AUTO DIFF WBC: CPT

## 2024-12-24 PROCEDURE — 6360000002 HC RX W HCPCS

## 2024-12-24 PROCEDURE — 6370000000 HC RX 637 (ALT 250 FOR IP)

## 2024-12-24 PROCEDURE — 80179 DRUG ASSAY SALICYLATE: CPT

## 2024-12-24 PROCEDURE — 96372 THER/PROPH/DIAG INJ SC/IM: CPT

## 2024-12-24 PROCEDURE — G0480 DRUG TEST DEF 1-7 CLASSES: HCPCS

## 2024-12-24 PROCEDURE — 83690 ASSAY OF LIPASE: CPT

## 2024-12-24 PROCEDURE — 81003 URINALYSIS AUTO W/O SCOPE: CPT

## 2024-12-24 PROCEDURE — 80143 DRUG ASSAY ACETAMINOPHEN: CPT

## 2024-12-24 PROCEDURE — 93010 ELECTROCARDIOGRAM REPORT: CPT | Performed by: INTERNAL MEDICINE

## 2024-12-24 PROCEDURE — 80053 COMPREHEN METABOLIC PANEL: CPT

## 2024-12-24 PROCEDURE — 90792 PSYCH DIAG EVAL W/MED SRVCS: CPT

## 2024-12-24 PROCEDURE — 80307 DRUG TEST PRSMV CHEM ANLYZR: CPT

## 2024-12-24 RX ORDER — DICYCLOMINE HYDROCHLORIDE 10 MG/ML
20 INJECTION INTRAMUSCULAR ONCE
Status: COMPLETED | OUTPATIENT
Start: 2024-12-24 | End: 2024-12-24

## 2024-12-24 RX ORDER — ONDANSETRON 4 MG/1
4 TABLET, ORALLY DISINTEGRATING ORAL ONCE
Status: COMPLETED | OUTPATIENT
Start: 2024-12-24 | End: 2024-12-24

## 2024-12-24 RX ORDER — DIPHENHYDRAMINE HYDROCHLORIDE 50 MG/ML
50 INJECTION INTRAMUSCULAR; INTRAVENOUS ONCE
Status: DISCONTINUED | OUTPATIENT
Start: 2024-12-24 | End: 2024-12-24

## 2024-12-24 RX ORDER — HALOPERIDOL 5 MG/ML
5 INJECTION INTRAMUSCULAR ONCE
Status: COMPLETED | OUTPATIENT
Start: 2024-12-24 | End: 2024-12-24

## 2024-12-24 RX ORDER — KETOROLAC TROMETHAMINE 15 MG/ML
30 INJECTION, SOLUTION INTRAMUSCULAR; INTRAVENOUS ONCE
Status: COMPLETED | OUTPATIENT
Start: 2024-12-24 | End: 2024-12-24

## 2024-12-24 RX ORDER — LORAZEPAM 2 MG/ML
2 INJECTION INTRAMUSCULAR ONCE
Status: DISCONTINUED | OUTPATIENT
Start: 2024-12-24 | End: 2024-12-24

## 2024-12-24 RX ORDER — DIPHENHYDRAMINE HYDROCHLORIDE 50 MG/ML
50 INJECTION INTRAMUSCULAR; INTRAVENOUS ONCE
Status: COMPLETED | OUTPATIENT
Start: 2024-12-24 | End: 2024-12-24

## 2024-12-24 RX ORDER — LORAZEPAM 2 MG/ML
2 INJECTION INTRAMUSCULAR ONCE
Status: COMPLETED | OUTPATIENT
Start: 2024-12-24 | End: 2024-12-24

## 2024-12-24 RX ADMIN — DIPHENHYDRAMINE HYDROCHLORIDE 50 MG: 50 INJECTION, SOLUTION INTRAMUSCULAR; INTRAVENOUS at 11:55

## 2024-12-24 RX ADMIN — ONDANSETRON 4 MG: 4 TABLET, ORALLY DISINTEGRATING ORAL at 11:16

## 2024-12-24 RX ADMIN — DICYCLOMINE HYDROCHLORIDE 20 MG: 10 INJECTION, SOLUTION INTRAMUSCULAR at 11:16

## 2024-12-24 RX ADMIN — HALOPERIDOL LACTATE 5 MG: 5 INJECTION, SOLUTION INTRAMUSCULAR at 11:55

## 2024-12-24 RX ADMIN — LORAZEPAM 2 MG: 2 INJECTION INTRAMUSCULAR; INTRAVENOUS at 11:55

## 2024-12-24 RX ADMIN — KETOROLAC TROMETHAMINE 30 MG: 15 INJECTION, SOLUTION INTRAMUSCULAR; INTRAVENOUS at 11:16

## 2024-12-24 ASSESSMENT — PAIN - FUNCTIONAL ASSESSMENT: PAIN_FUNCTIONAL_ASSESSMENT: 0-10

## 2024-12-24 ASSESSMENT — PAIN DESCRIPTION - LOCATION
LOCATION: ABDOMEN
LOCATION: ABDOMEN

## 2024-12-24 ASSESSMENT — PAIN SCALES - GENERAL
PAINLEVEL_OUTOF10: 0
PAINLEVEL_OUTOF10: 10
PAINLEVEL_OUTOF10: 9

## 2024-12-24 NOTE — VIRTUAL HEALTH
Mario Orellana  [id number]  1990     EMERGENCY DEPARTMENT TELEPSYCHIATRY EVALUATION    12/24/24    Chief Complaint:  “Psychiatric Evaluation”    HPI: Patient is a 34 y.o.  male who presents for psychiatric evaluation.  He is currently staying at a [facility] shelter and reports difficulties there with several of the other residents and staff.  He states, \"they are violating my HIPAA, talking about me all the time, taking my picture, and not getting me the medical help I need.\"  He has been to the ER many times this month for complaints of abdominal pain, and further chart review indicates that he has experienced psychosis with inpatient psychiatric admission in the past.  He is currently denying SI/HI/AVH, but collateral from ED nursing notes indicates a labile affect with intermittent aggressive comments such as \"You better not discharge me! If you guys do I'll fucking cut you!\"     Past Psychiatric History:  Previous Diagnoses/symptoms: intellectual disability, psychosis  Previous suicide attempts/self-harm: Denies  Inpatient psychiatric hospitalizations: yes many times over course of life  Current outpatient psychiatric provider: Denies  Current therapist: States not in therapy  Previous psychiatric medication trials: yes  Current psychiatric medications: not taking currently  History of ECT: no  Family Psychiatric History: Denies    Substance Abuse History:  Tobacco: Endorses use  Alcohol: Denies  Marijuana: Denies  Stimulant: Endorses meth use in the past  Opiates: Denies  Benzodiazepine: Denies  Other illicit drug usage: Denies  History of substance/alcohol abuse treatment: Denies    Social History:  Education: \[employer]\"" - IEP  Living Situation/Interest: shelter  Marital/Committed relationship and parenting hx: single  Occupation: Unemployed  Legal History/Hx of Violence: Denies  Spiritual History: Denies  Psychological trauma, neglect, or abuse: denies hx of trauma/abuse   Access to guns or other weapons:

## 2024-12-24 NOTE — ED PROVIDER NOTES
[facility] EMERGENCY DEPARTMENT  EMERGENCY DEPARTMENT ENCOUNTER        Pt Name: Mario Orellana  MRN: [id number]  Birthdate 1990  Date of evaluation: 12/24/2024  Provider: ROSE Rosas CNP  PCP: No primary care provider on file.  Note Started: 11:45 AM EST 12/24/24       I have seen and evaluated this patient with my supervising physician CESAR Guevara      CHIEF COMPLAINT       Chief Complaint   Patient presents with    Abdominal Pain    Mental Health Problem       HISTORY OF PRESENT ILLNESS: 1 or more Elements     History From: Patient    Limitations to history : None    Social Determinants Significantly Affecting Health : None    Chief Complaint: Abdominal cramping brought in by police department for making threats at [facility]    Mario Orellana is a 34 y.o. male history of chronic abdominal pain, GERD, OCD, schizoaffective schizophrenia who presents to ED stating he still has abdominal pain from yesterday.  Reported that he does not know why the police brought him in.  He states he may have said something to the other people but he is not sure what he said.  He states that they were not listening to him about his medical condition and it was making him mad.  He states his abdominal cramping is not in his head.  He states it is from his chronic abdominal pain from his prolapse.  He denies any fever vomiting diarrhea.  States he is eating and drinking well.  Denies any chest pain or shortness of breath.  He denies any suicidal ideations or homicidal ideations.  States he does not want to harm anybody.  Nursing team informed me that he was making threats towards the [facility] staff stating he was going to stab them.  Stated he was going to stab the other people.     Nursing Notes were all reviewed and agreed with or any disagreements were addressed in the HPI.    REVIEW OF SYSTEMS :      Review of Systems    Positives and Pertinent negatives

## 2024-12-25 NOTE — ED NOTES
[id number] faxed pink slip to [facility] [phone].  
Nurse to nurse given to RN at [facility]   
Pt arrives to ED via EMS after EMS was called via PD. Pt was in the warming center and pt reportedly was threatening to kill other people in the center. Pt also was reportedly speaking to spaceships according to EMS/PD. PD was dispatched and EMS arrived to al pt. Upon arrival pt denied mental health issues and reported pain 10/10 in their ABD.   
Pt noted to have increased agitation when pt overheard staff on walkie. Staff had stated they were discharging a different room when this pt misinterpreted thinking staff had said they were discharging this pt. This pt then stated \"You better not discharge me! If you guys do I'll fucking cut you!\". Charge nurse notified, sitter placed in room. Pt reassured they were speaking about another room and not this pt.   
Pt reports people at the shelter are \"at it again\". Pt states they are trying to get pt to leave and stating pt is hurting others, pt denies hurting others. Pt states PD has been following pt around as well. Pt requests this nurse to call PD and let them know he's \"powerful\". Pt states they heard staff at the [facility] speaking about pt and they felt they had \"their HIPAA rights violated\" by staff.   
Transport here to  patient . Pt Cooperative and sleepy. Security at bedside   
chemical results unless requested in original order.     UA:  Lab Results   Component Value Date/Time    COLORU Yellow 12/24/2024 10:07 AM    PROTEINU NEGATIVE 12/24/2024 10:07 AM    GLUCOSEU NEGATIVE 12/24/2024 10:07 AM    KETUA NEGATIVE 12/24/2024 10:07 AM    BILIRUBINUR NEGATIVE 12/24/2024 10:07 AM    BLOODU NEGATIVE 09/13/2023 07:05 AM    UROBILINOGEN 0.2 12/24/2024 10:07 AM    NITRU NEGATIVE 12/24/2024 10:07 AM    LEUKOCYTESUR NEGATIVE 12/24/2024 10:07 AM    WBCUA 2 12/30/2022 11:50 AM    RBCUA 1 12/30/2022 11:50 AM    BACTERIA NEGATIVE 12/30/2022 11:50 AM     PREGNANCY TEST: No results found for: \"PREGTESTUR\"    Dialysis: No    Target Destination: none    Insurance: Payor: [employer] MEDICARE /  /  /      Current Psychotic Symptoms  Harmful Actions Towards Others:    Orientation Level:    Speech Pattern:    General Attitude:    Emotions:    Delusions:    Hallucination:       Any Suicidal Ideations: No Risk    Homicidal Ideations/Violence Risk:   Observed Violent Behavior:    Homicidal Ideations:      Past Psychiatric History:       Diagnosis Date    Bipolar 1 disorder (HCC)     Chronic generalized abdominal pain     Depression     GERD (gastroesophageal reflux disease)     IBS (irritable bowel syndrome)     Mental impairment     OCD (obsessive compulsive disorder)     Schizo affective schizophrenia (HCC)        Hold (TDO/Involuntary Hold): Yes    The patient is currently receiving care for the above psychiatric illness.     Home Medications  Does Patient Have Medications to Bring to the Facility: No  Medications Prior to Admission:   Prior to Admission Medications   Prescriptions Last Dose Informant Patient Reported? Taking?   dicyclomine (BENTYL) 10 MG capsule   No No   Sig: Take 1 capsule by mouth 4 times daily (before meals and nightly)   famotidine (PEPCID) 20 MG tablet   No No   Sig: Take 1 tablet by mouth 2 times daily for 14 days   ibuprofen (ADVIL;MOTRIN) 600 MG tablet   No No   Sig: Take 1 tablet by mouth

## 2025-01-07 ENCOUNTER — HOSPITAL ENCOUNTER (EMERGENCY)
Age: 35
Discharge: HOME | End: 2025-01-08
Payer: MEDICARE

## 2025-01-07 ENCOUNTER — HOSPITAL ENCOUNTER (EMERGENCY)
Age: 35
Discharge: LEFT BEFORE DISCHARGE | End: 2025-01-07
Payer: MEDICARE

## 2025-01-07 VITALS
RESPIRATION RATE: 20 BRPM | DIASTOLIC BLOOD PRESSURE: 85 MMHG | HEART RATE: 94 BPM | SYSTOLIC BLOOD PRESSURE: 129 MMHG | OXYGEN SATURATION: 95 % | TEMPERATURE: 98.5 F

## 2025-01-07 VITALS
DIASTOLIC BLOOD PRESSURE: 113 MMHG | RESPIRATION RATE: 22 BRPM | HEART RATE: 100 BPM | SYSTOLIC BLOOD PRESSURE: 146 MMHG | TEMPERATURE: 98.6 F | OXYGEN SATURATION: 98 %

## 2025-01-07 DIAGNOSIS — R10.9 CHRONIC ABDOMINAL PAIN: Primary | ICD-10-CM

## 2025-01-07 DIAGNOSIS — G89.29 CHRONIC ABDOMINAL PAIN: Primary | ICD-10-CM

## 2025-01-07 LAB
ALBUMIN SERPL-MCNC: 4.7 G/DL (ref 3.4–5)
ALBUMIN/GLOB SERPL: 1.7 {RATIO} (ref 1.1–2.2)
ALP SERPL-CCNC: 76 U/L (ref 40–129)
ALT SERPL-CCNC: 13 U/L (ref 10–40)
ANION GAP SERPL CALCULATED.3IONS-SCNC: 11 MMOL/L (ref 9–17)
AST SERPL-CCNC: 19 U/L (ref 15–37)
BASOPHILS # BLD: 0.02 K/UL
BASOPHILS NFR BLD: 0 % (ref 0–1)
BILIRUB SERPL-MCNC: 0.2 MG/DL (ref 0–1)
BILIRUB UR QL STRIP: NEGATIVE
BUN SERPL-MCNC: 13 MG/DL (ref 7–20)
CALCIUM SERPL-MCNC: 9.8 MG/DL (ref 8.3–10.6)
CHLORIDE SERPL-SCNC: 99 MMOL/L (ref 99–110)
CLARITY UR: CLEAR
CO2 SERPL-SCNC: 29 MMOL/L (ref 21–32)
COLOR UR: YELLOW
COMMENT: ABNORMAL
CREAT SERPL-MCNC: 1 MG/DL (ref 0.9–1.3)
EOSINOPHIL # BLD: 0.12 K/UL
EOSINOPHILS RELATIVE PERCENT: 2 % (ref 0–3)
ERYTHROCYTE [DISTWIDTH] IN BLOOD BY AUTOMATED COUNT: 11.7 % (ref 11.7–14.9)
GFR, ESTIMATED: 85 ML/MIN/1.73M2
GLUCOSE SERPL-MCNC: 102 MG/DL (ref 74–99)
GLUCOSE UR STRIP-MCNC: NEGATIVE MG/DL
HCT VFR BLD AUTO: 44.1 % (ref 42–52)
HGB BLD-MCNC: 14.7 G/DL (ref 13.5–18)
HGB UR QL STRIP.AUTO: NEGATIVE
IMM GRANULOCYTES # BLD AUTO: 0.02 K/UL
IMM GRANULOCYTES NFR BLD: 0 %
KETONES UR STRIP-MCNC: NEGATIVE MG/DL
LEUKOCYTE ESTERASE UR QL STRIP: NEGATIVE
LIPASE SERPL-CCNC: 46 U/L (ref 13–60)
LYMPHOCYTES NFR BLD: 1.77 K/UL
LYMPHOCYTES RELATIVE PERCENT: 23 % (ref 24–44)
MCH RBC QN AUTO: 30.8 PG (ref 27–31)
MCHC RBC AUTO-ENTMCNC: 33.3 G/DL (ref 32–36)
MCV RBC AUTO: 92.5 FL (ref 78–100)
MONOCYTES NFR BLD: 0.61 K/UL
MONOCYTES NFR BLD: 8 % (ref 0–4)
NEUTROPHILS NFR BLD: 68 % (ref 36–66)
NEUTS SEG NFR BLD: 5.29 K/UL
NITRITE UR QL STRIP: NEGATIVE
PH UR STRIP: 7 [PH] (ref 5–8)
PLATELET # BLD AUTO: 220 K/UL (ref 140–440)
PMV BLD AUTO: 9.6 FL (ref 7.5–11.1)
POTASSIUM SERPL-SCNC: 4.1 MMOL/L (ref 3.5–5.1)
PROT SERPL-MCNC: 7.4 G/DL (ref 6.4–8.2)
PROT UR STRIP-MCNC: NEGATIVE MG/DL
RBC # BLD AUTO: 4.77 M/UL (ref 4.6–6.2)
SODIUM SERPL-SCNC: 139 MMOL/L (ref 136–145)
SP GR UR STRIP: <1.005 (ref 1–1.03)
UROBILINOGEN UR STRIP-ACNC: 0.2 EU/DL (ref 0–1)
WBC OTHER # BLD: 7.8 K/UL (ref 4–10.5)

## 2025-01-07 PROCEDURE — 83690 ASSAY OF LIPASE: CPT

## 2025-01-07 PROCEDURE — 96372 THER/PROPH/DIAG INJ SC/IM: CPT

## 2025-01-07 PROCEDURE — 99283 EMERGENCY DEPT VISIT LOW MDM: CPT

## 2025-01-07 PROCEDURE — 85025 COMPLETE CBC W/AUTO DIFF WBC: CPT

## 2025-01-07 PROCEDURE — 81003 URINALYSIS AUTO W/O SCOPE: CPT

## 2025-01-07 PROCEDURE — 80053 COMPREHEN METABOLIC PANEL: CPT

## 2025-01-07 PROCEDURE — 6360000002 HC RX W HCPCS: Performed by: PHYSICIAN ASSISTANT

## 2025-01-07 PROCEDURE — 99284 EMERGENCY DEPT VISIT MOD MDM: CPT

## 2025-01-07 RX ORDER — DICYCLOMINE HYDROCHLORIDE 10 MG/ML
20 INJECTION INTRAMUSCULAR ONCE
Status: DISCONTINUED | OUTPATIENT
Start: 2025-01-07 | End: 2025-01-07 | Stop reason: HOSPADM

## 2025-01-07 RX ORDER — KETOROLAC TROMETHAMINE 15 MG/ML
15 INJECTION, SOLUTION INTRAMUSCULAR; INTRAVENOUS ONCE
Status: COMPLETED | OUTPATIENT
Start: 2025-01-07 | End: 2025-01-07

## 2025-01-07 RX ORDER — DICYCLOMINE HYDROCHLORIDE 10 MG/ML
10 INJECTION INTRAMUSCULAR ONCE
Status: COMPLETED | OUTPATIENT
Start: 2025-01-07 | End: 2025-01-07

## 2025-01-07 RX ORDER — KETOROLAC TROMETHAMINE 15 MG/ML
15 INJECTION, SOLUTION INTRAMUSCULAR; INTRAVENOUS ONCE
Status: DISCONTINUED | OUTPATIENT
Start: 2025-01-07 | End: 2025-01-07 | Stop reason: HOSPADM

## 2025-01-07 RX ADMIN — DICYCLOMINE HYDROCHLORIDE 10 MG: 10 INJECTION, SOLUTION INTRAMUSCULAR at 22:25

## 2025-01-07 RX ADMIN — KETOROLAC TROMETHAMINE 15 MG: 15 INJECTION, SOLUTION INTRAMUSCULAR; INTRAVENOUS at 22:25

## 2025-01-07 ASSESSMENT — PAIN DESCRIPTION - LOCATION
LOCATION: ABDOMEN
LOCATION: ABDOMEN

## 2025-01-07 ASSESSMENT — PAIN SCALES - GENERAL
PAINLEVEL_OUTOF10: 10
PAINLEVEL_OUTOF10: 8
PAINLEVEL_OUTOF10: 10

## 2025-01-07 ASSESSMENT — PAIN - FUNCTIONAL ASSESSMENT
PAIN_FUNCTIONAL_ASSESSMENT: 0-10
PAIN_FUNCTIONAL_ASSESSMENT: 0-10

## 2025-01-07 NOTE — ED PROVIDER NOTES
Never    Smokeless tobacco: Never   Vaping Use    Vaping status: Never Used   Substance Use Topics    Alcohol use: Never    Drug use: Never       Latex, Adhesive tape, Codeine, Iodine, and Shellfish-derived products    The patient’s home medications have been reviewed.  Discharge Medication List as of 1/7/2025  5:35 PM        CONTINUE these medications which have NOT CHANGED    Details   dicyclomine (BENTYL) 10 MG capsule Take 1 capsule by mouth 4 times daily (before meals and nightly), Disp-120 capsule, R-0Normal      ibuprofen (ADVIL;MOTRIN) 600 MG tablet Take 1 tablet by mouth 3 times daily as needed for Pain, Disp-30 tablet, R-0Normal      ondansetron (ZOFRAN-ODT) 4 MG disintegrating tablet Take 1 tablet by mouth 3 times daily as needed for Nausea or Vomiting, Disp-21 tablet, R-0Normal      famotidine (PEPCID) 20 MG tablet Take 1 tablet by mouth 2 times daily for 14 days, Disp-28 tablet, R-0Print               SCREENINGS            Reading Coma Scale  Eye Opening: Spontaneous  Best Verbal Response: Oriented  Best Motor Response: Obeys commands  Jose Coma Scale Score: 15              CIWA Assessment  BP: (!) 146/113  Pulse: 100             PHYSICAL EXAM    ED Triage Vitals [01/07/25 1518]   BP Systolic BP Percentile Diastolic BP Percentile Temp Temp src Pulse Respirations SpO2   (!) 146/113 -- -- 98.6 °F (37 °C) -- 100 22 98 %      Height Weight         -- --             Physical Exam  Vitals and nursing note reviewed.   Constitutional:       Appearance: Normal appearance. He is well-developed. He is not ill-appearing or diaphoretic.   HENT:      Head: Normocephalic and atraumatic.   Eyes:      General: No scleral icterus.        Right eye: No discharge.         Left eye: No discharge.   Cardiovascular:      Rate and Rhythm: Normal rate and regular rhythm.      Heart sounds: Normal heart sounds. No murmur heard.     No friction rub. No gallop.   Pulmonary:      Effort: Pulmonary effort is normal. No  transaminitis.  Urinalysis not suggestive of urinary tract infection.  Vitals hypertensive otherwise within normal limits.  Prior to reevaluation patient reportedly had eloped from the emergency department.   However did consider the potential diagnosis In my discussion with him and reviewing EMR, patient symptoms appear to be chronic.  And I do have low suspicion for acute etiology and not seeing evidence for any imaging today.  Prior to patient eloping did have discussion with him regarding potential causes and likely chronic nature with recommendations to follow-up with PCP.  He did verbalize understanding.  Found in the  Was,  Condition is: mild stable chronic illness    Discussion with Other Profesionals : None    Disposition Considerations (tests considered but not done, Shared Decision Making, Pt Expectation of Test or Tx.): none (unless indicated in ED Course, Summary, Reassessment, or MDM    Escalation of care, including admission/OBS considered : Appropriate for outpatient management.     Records Reviewed : None    Social Determinants of Health : None     Chronic conditions affecting care:    has a past medical history of Bipolar 1 disorder (HCC), Chronic generalized abdominal pain, Depression, GERD (gastroesophageal reflux disease), IBS (irritable bowel syndrome), Mental impairment, OCD (obsessive compulsive disorder), and Schizo affective schizophrenia (HCC).    I am the Primary Clinician of Record.    Patient was given the following medications:  Medications - No data to display      Is this patient to be included in the SEP-1 Core Measure due to severe sepsis or septic shock?   No   Exclusion criteria - the patient is NOT to be included for SEP-1 Core Measure due to:  Alternative explanation for abnormal labs/vitals that do not relate to sepsis, see MDM for further explanation    CRITICAL CARE TIME         PROCEDURES   Unless otherwise noted  none     Procedures      FINAL IMPRESSION      1. Chronic

## 2025-01-07 NOTE — ED TRIAGE NOTES
Abdominal pain, chronic issue.    Patient just came to desk to ask what time he would be in a room. Says he came by ambulance and was supposed to go to a room.

## 2025-01-08 ENCOUNTER — APPOINTMENT (OUTPATIENT)
Dept: GENERAL RADIOLOGY | Age: 35
End: 2025-01-08
Payer: MEDICARE

## 2025-01-08 ENCOUNTER — HOSPITAL ENCOUNTER (EMERGENCY)
Age: 35
Discharge: HOME | End: 2025-01-08
Attending: EMERGENCY MEDICINE
Payer: MEDICARE

## 2025-01-08 VITALS
DIASTOLIC BLOOD PRESSURE: 85 MMHG | RESPIRATION RATE: 20 BRPM | WEIGHT: 150 LBS | BODY MASS INDEX: 26.58 KG/M2 | OXYGEN SATURATION: 96 % | SYSTOLIC BLOOD PRESSURE: 134 MMHG | TEMPERATURE: 98.4 F | HEART RATE: 99 BPM | HEIGHT: 63 IN

## 2025-01-08 DIAGNOSIS — K59.00 CONSTIPATION, UNSPECIFIED CONSTIPATION TYPE: Primary | ICD-10-CM

## 2025-01-08 PROCEDURE — 99283 EMERGENCY DEPT VISIT LOW MDM: CPT

## 2025-01-08 PROCEDURE — 6370000000 HC RX 637 (ALT 250 FOR IP): Performed by: EMERGENCY MEDICINE

## 2025-01-08 PROCEDURE — 74019 RADEX ABDOMEN 2 VIEWS: CPT

## 2025-01-08 RX ORDER — DOCUSATE SODIUM 100 MG/1
100 CAPSULE, LIQUID FILLED ORAL 2 TIMES DAILY
Qty: 30 CAPSULE | Refills: 0 | Status: SHIPPED | OUTPATIENT
Start: 2025-01-08 | End: 2025-01-10

## 2025-01-08 RX ORDER — DICYCLOMINE HCL 20 MG
20 TABLET ORAL ONCE
Status: DISCONTINUED | OUTPATIENT
Start: 2025-01-08 | End: 2025-01-08 | Stop reason: HOSPADM

## 2025-01-08 RX ORDER — POLYETHYLENE GLYCOL 3350 17 G/17G
17 POWDER, FOR SOLUTION ORAL DAILY
Qty: 510 G | Refills: 0 | Status: SHIPPED | OUTPATIENT
Start: 2025-01-08 | End: 2025-01-10

## 2025-01-08 RX ORDER — LACTULOSE 10 G/15ML
20 SOLUTION ORAL ONCE
Status: DISCONTINUED | OUTPATIENT
Start: 2025-01-08 | End: 2025-01-08 | Stop reason: HOSPADM

## 2025-01-08 RX ORDER — SENNA AND DOCUSATE SODIUM 50; 8.6 MG/1; MG/1
2 TABLET, FILM COATED ORAL ONCE
Status: DISCONTINUED | OUTPATIENT
Start: 2025-01-08 | End: 2025-01-08 | Stop reason: HOSPADM

## 2025-01-08 RX ORDER — ACETAMINOPHEN 325 MG/1
650 TABLET ORAL ONCE
Status: COMPLETED | OUTPATIENT
Start: 2025-01-08 | End: 2025-01-08

## 2025-01-08 RX ADMIN — ACETAMINOPHEN 650 MG: 325 TABLET ORAL at 17:52

## 2025-01-08 ASSESSMENT — PAIN DESCRIPTION - LOCATION
LOCATION: ABDOMEN
LOCATION: ABDOMEN

## 2025-01-08 ASSESSMENT — PAIN SCALES - GENERAL
PAINLEVEL_OUTOF10: 0
PAINLEVEL_OUTOF10: 0
PAINLEVEL_OUTOF10: 10
PAINLEVEL_OUTOF10: 8

## 2025-01-08 ASSESSMENT — PAIN - FUNCTIONAL ASSESSMENT
PAIN_FUNCTIONAL_ASSESSMENT: 0-10
PAIN_FUNCTIONAL_ASSESSMENT: 0-10

## 2025-01-08 ASSESSMENT — PAIN DESCRIPTION - PAIN TYPE: TYPE: ACUTE PAIN

## 2025-01-08 NOTE — DISCHARGE INSTRUCTIONS
Home Care Instructions: Abdominal Pain     Many things may cause abdominal pain.  Your ER visit might not show the exact reason you are having pain.  In some cases, additional time is needed to determine if the cause is serious. Therefore you may be told to go home and watch for any changes or worsening in your condition.  Before that, we may not know if you need more testing, or if hospitalization or surgery is necessary.  If it’s not something serious, the pain may go away without treatment or get better with simple things like avoiding certain foods or medications.    In the ER, your doctor asks you questions, examines you and in some cases, may order tests.  These help doctors decide if the pain is from something serious.  Tests are not always done and may not provide a definite answer.  There can still be a problem, even with normal test results.   Abdominal pain may be caused by something serious (like appendicitis), which is not obvious right away.  Because of this, another checkup is needed to make sure you are OK.  It is VERY IMPORTANT to follow up for a repeat exam, especially if you have any symptoms that are not going away or are getting worse.   We recommend that you RETURN TO THE EMERGENCY ROOM IN 8-12 HOURS to be rechecked.  If you cannot, you may follow up with your primary care doctor or clinic.                        It is important that you follow all of the instructions below.  RETURN TO THE EMERGENCY ROOM IMMEDIATELY IF:   The pain does not go away or gets worse.       You have a fever.   You keep throwing up and cannot keep anything down.   You pass bloody or black stools.    HOME CARE INSTRUCTIONS   Come back to the ER (or see your doctor) in 8-12 hours.   DO NOT take laxatives unless directed by your doctor.   Avoid the use of alcohol  Take pain medicine only as directed by your doctor.  Only take over-the-counter or prescription medicine as directed by your doctor.   Try a clear liquid diet 
No

## 2025-01-08 NOTE — ED NOTES
PT asked this RN \"Are you going to give me any medications?\"     Advised the provider will make the decision if any medications are to be administered. Pt did not specify which medications pt wanted.

## 2025-01-08 NOTE — ED PROVIDER NOTES
Emergency Department Encounter    Patient: Mario Orellana  MRN: [id number]  : 1990  Date of Evaluation: 2025  ED Provider:  Amy Lal DO    Triage Chief Complaint:   Constipation (Pt states 10/10 pain RLQ pain. Pt endorses constipation last BM yesterday. )    [place]:  Mario Orellana is a 34 y.o. male with a history of bipolar disorder chronic abdominal pain depression acid reflux irritable bowel mental impairment OCD schizophrenia that presents to the emergency department complaining of abdominal pain constipation.  Patient states his last bowel movement yesterday.  Patient states he had diarrhea yesterday.  Patient states he had took some stool softeners a week ago but nothing recently.  He denies any fever chills cough sore throat runny nose earache.  Patient here for evaluation.    ROS - see HPI, below listed is current ROS at time of my eval:  10 systems reviewed and negative except as above.     Past Medical History:   Diagnosis Date    Bipolar 1 disorder (HCC)     Chronic generalized abdominal pain     Depression     GERD (gastroesophageal reflux disease)     IBS (irritable bowel syndrome)     Mental impairment     OCD (obsessive compulsive disorder)     Schizo affective schizophrenia (HCC)      Past Surgical History:   Procedure Laterality Date    COLONOSCOPY      does not know    EGD      does not know     Family History   Problem Relation Age of Onset    Colon Cancer Neg Hx     Esophageal Cancer Neg Hx     Liver Cancer Neg Hx     Rectal Cancer Neg Hx     Stomach Cancer Neg Hx      Social History     Socioeconomic History    Marital status: Unknown     Spouse name: Not on file    Number of children: Not on file    Years of education: Not on file    Highest education level: Not on file   Occupational History    Not on file   Tobacco Use    Smoking status: Never    Smokeless tobacco: Never   Vaping Use    Vaping status: Never Used   Substance and Sexual Activity    Alcohol use: Never

## 2025-01-08 NOTE — ED NOTES
Patient came to nurse station and stated \"when is the doctor going to see me? Where are my meds? I'm in pain\".   Advised to patient the doctor will be over to assess patient. Requested patient to wait for doctor.

## 2025-01-08 NOTE — ED PROVIDER NOTES
[facility] EMERGENCY DEPARTMENT  EMERGENCY DEPARTMENT ENCOUNTER        Pt Name: Mario Orellana  MRN: [id number]  Birthdate 1990  Date of evaluation: 1/7/2025  Provider: Camden Barrientos PA-C  PCP: No primary care provider on file.      TALA. I have evaluated this patient.        CHIEF COMPLAINT      Chief Complaint   Patient presents with    Abdominal Pain     Patient has been seen 2 times today. Patient stating in triage that staff doesn't know what they are doing and that he has a problem and that he is not supposed to be discharged.        HISTORY OF PRESENT ILLNESS:     History from : Patient    Limitations to history : None    Mario Orellana is a 34 y.o. male who presents complaint of abdominal pain.  Patient seen a few hours ago in the ED, but he tells me today he left because he was tired of waiting.  Reportedly he had gone to [place] after leaving here.  Patient states that he was not cared for there so he came here.  Mentions history of abdominal pain with previus episodes in the past, and also mentions concern for some rectal pain and prolapse.  Denies any urinary symptoms, URI symptoms, fever, chest pain, urinary symptoms    Nursing Notes were all reviewed and agreed with or any disagreements were addressed in the HPI.    REVIEW OF SYSTEMS :     Review of Systems   All other systems reviewed and are negative.      Pertinent positives and negatives are stated within HPI    PAST HISTORY   has a past medical history of Bipolar 1 disorder (HCC), Chronic generalized abdominal pain, Depression, GERD (gastroesophageal reflux disease), IBS (irritable bowel syndrome), Mental impairment, OCD (obsessive compulsive disorder), and Schizo affective schizophrenia (HCC).    Past Surgical History:   Procedure Laterality Date    COLONOSCOPY      does not know    EGD      does not know       Family History   Problem Relation Age of Onset    Colon Cancer Neg Hx     Esophageal Cancer Neg Hx   Toradol and Bentyl as he has had in the past with withdrawal symptoms and he is comfortable with receiving these and I did advise him to follow-up with primary care provider.  He verbalized understanding was agreeable this plan.     Condition is: mild stable chronic illness    Discussion with Other Profesionals : None    Disposition Considerations (tests considered but not done, Shared Decision Making, Pt Expectation of Test or Tx.): Tests considered but not done: CT abdomen pelvis, basic lab work, however as detailed above, symptoms are chronic and low suspicion for acute findings therefore imaging and blood work deferred based on clinical judgment    Escalation of care, including admission/OBS considered : Appropriate for outpatient management.     Records Reviewed : None    Social Determinants of Health : None     Chronic conditions affecting care:    has a past medical history of Bipolar 1 disorder (HCC), Chronic generalized abdominal pain, Depression, GERD (gastroesophageal reflux disease), IBS (irritable bowel syndrome), Mental impairment, OCD (obsessive compulsive disorder), and Schizo affective schizophrenia (HCC).    I am the Primary Clinician of Record.    Patient was given the following medications:  Medications   ketorolac (TORADOL) injection 15 mg (15 mg IntraMUSCular Given 1/7/25 2225)   dicyclomine (BENTYL) injection 10 mg (10 mg IntraMUSCular Given 1/7/25 2225)         Is this patient to be included in the SEP-1 Core Measure due to severe sepsis or septic shock?   No   Exclusion criteria - the patient is NOT to be included for SEP-1 Core Measure due to:  2+ SIRS criteria are not met    CRITICAL CARE TIME         PROCEDURES   Unless otherwise noted  none     Procedures      FINAL IMPRESSION      1. Chronic abdominal pain          DISPOSITION/PLAN     DISPOSITION Decision To Discharge 01/07/2025 10:23:07 PM   DISPOSITION CONDITION Stable           PATIENT REFERRED TO:  No follow-up provider

## 2025-01-09 ENCOUNTER — HOSPITAL ENCOUNTER (EMERGENCY)
Age: 35
Discharge: HOME | End: 2025-01-09
Payer: MEDICARE

## 2025-01-09 VITALS
DIASTOLIC BLOOD PRESSURE: 86 MMHG | TEMPERATURE: 99 F | HEART RATE: 96 BPM | SYSTOLIC BLOOD PRESSURE: 120 MMHG | BODY MASS INDEX: 26.58 KG/M2 | HEIGHT: 63 IN | RESPIRATION RATE: 17 BRPM | WEIGHT: 150 LBS | OXYGEN SATURATION: 95 %

## 2025-01-09 DIAGNOSIS — K59.09 CHRONIC CONSTIPATION: Primary | ICD-10-CM

## 2025-01-09 PROCEDURE — 6360000002 HC RX W HCPCS: Performed by: PHYSICIAN ASSISTANT

## 2025-01-09 PROCEDURE — 99284 EMERGENCY DEPT VISIT MOD MDM: CPT

## 2025-01-09 PROCEDURE — 96372 THER/PROPH/DIAG INJ SC/IM: CPT

## 2025-01-09 RX ORDER — DICYCLOMINE HYDROCHLORIDE 10 MG/ML
20 INJECTION INTRAMUSCULAR ONCE
Status: COMPLETED | OUTPATIENT
Start: 2025-01-09 | End: 2025-01-09

## 2025-01-09 RX ORDER — KETOROLAC TROMETHAMINE 15 MG/ML
30 INJECTION, SOLUTION INTRAMUSCULAR; INTRAVENOUS ONCE
Status: COMPLETED | OUTPATIENT
Start: 2025-01-09 | End: 2025-01-09

## 2025-01-09 RX ADMIN — KETOROLAC TROMETHAMINE 30 MG: 15 INJECTION, SOLUTION INTRAMUSCULAR; INTRAVENOUS at 18:44

## 2025-01-09 RX ADMIN — DICYCLOMINE HYDROCHLORIDE 20 MG: 10 INJECTION, SOLUTION INTRAMUSCULAR at 18:44

## 2025-01-09 ASSESSMENT — PAIN - FUNCTIONAL ASSESSMENT: PAIN_FUNCTIONAL_ASSESSMENT: 0-10

## 2025-01-09 ASSESSMENT — PAIN DESCRIPTION - LOCATION
LOCATION: BUTTOCKS
LOCATION: BUTTOCKS

## 2025-01-09 ASSESSMENT — PAIN DESCRIPTION - DESCRIPTORS: DESCRIPTORS: CRAMPING

## 2025-01-09 ASSESSMENT — PAIN DESCRIPTION - PAIN TYPE: TYPE: ACUTE PAIN;CHRONIC PAIN

## 2025-01-09 ASSESSMENT — PAIN SCALES - GENERAL
PAINLEVEL_OUTOF10: 6
PAINLEVEL_OUTOF10: 10

## 2025-01-09 NOTE — DISCHARGE INSTRUCTIONS
Establish and follow-up with a primary care physician Dr. Kumar. Call for an appointment.  Follow up with Gastroenterologist Dr. Burrell for evaluation of abdominal pain. Call for an appointment  Take miralax and colace as prescribed for constipation  Increase fruits vegetables water fiber in the diet to prevent constipation  Return to the emergency department immediately any pain fever chills nausea vomiting dizzy lightheaded any worsening symptoms.

## 2025-01-09 NOTE — ED PROVIDER NOTES
[facility] EMERGENCY DEPARTMENT  EMERGENCY DEPARTMENT ENCOUNTER        Pt Name: Mario Orellana  MRN: [id number]  Birthdate 1990  Date of evaluation: 1/9/2025  Provider: Cesario Krueger PA-C  PCP: No primary care provider on file.  Note Started: 6:33 PM EST 1/9/25      TALA. I have evaluated this patient.        CHIEF COMPLAINT       Chief Complaint   Patient presents with    Cramps in Butt       HISTORY OF PRESENT ILLNESS: 1 or more Elements     History From: pt    Mario Orellana is a 34 y.o. male with past medical history of IBS, bipolar, OCD, schizophrenia who presents with cramping in his butt, abdominal cramping.  Patient was seen last 2 days for constipation, abdominal cramping, seen frequently for same.  Had abdominal x-ray negative yesterday, lab workup -2 days before.  He was given multiple medications for constipation at discharge yesterday.  He states he did not have time to pick him up so he has not taken anything.  He is requesting IM shots of Toradol and Bentyl for symptoms.  Denies any fever, vomiting, trauma, blood in stool, changes in urination.  States his symptoms are typical of his prior cramping/IBS    Nursing Notes were all reviewed and agreed with or any disagreements were addressed in the HPI.    REVIEW OF SYSTEMS :      Review of Systems   All other systems reviewed and are negative.      Positives and Pertinent negatives as per HPI.       PAST MEDICAL HISTORY    has a past medical history of Bipolar 1 disorder (HCC), Chronic generalized abdominal pain, Depression, GERD (gastroesophageal reflux disease), IBS (irritable bowel syndrome), Mental impairment, OCD (obsessive compulsive disorder), and Schizo affective schizophrenia (HCC).     SURGICAL HISTORY     Past Surgical History:   Procedure Laterality Date    COLONOSCOPY      does not know    EGD      does not know       CURRENTMEDICATIONS       Previous Medications    DICYCLOMINE (BENTYL) 10 MG CAPSULE

## 2025-01-10 ENCOUNTER — HOSPITAL ENCOUNTER (EMERGENCY)
Age: 35
Discharge: HOME | End: 2025-01-10
Attending: EMERGENCY MEDICINE
Payer: MEDICARE

## 2025-01-10 VITALS
HEART RATE: 80 BPM | OXYGEN SATURATION: 99 % | TEMPERATURE: 98 F | DIASTOLIC BLOOD PRESSURE: 76 MMHG | RESPIRATION RATE: 17 BRPM | SYSTOLIC BLOOD PRESSURE: 118 MMHG

## 2025-01-10 DIAGNOSIS — R10.84 GENERALIZED ABDOMINAL PAIN: ICD-10-CM

## 2025-01-10 DIAGNOSIS — K59.00 CONSTIPATION, UNSPECIFIED CONSTIPATION TYPE: Primary | ICD-10-CM

## 2025-01-10 DIAGNOSIS — Z76.0 ENCOUNTER FOR MEDICATION REFILL: ICD-10-CM

## 2025-01-10 DIAGNOSIS — Z59.00 HOMELESSNESS: ICD-10-CM

## 2025-01-10 LAB
ALBUMIN SERPL-MCNC: 4.4 G/DL (ref 3.4–5)
ALBUMIN/GLOB SERPL: 1.6 {RATIO} (ref 1.1–2.2)
ALP SERPL-CCNC: 77 U/L (ref 40–129)
ALT SERPL-CCNC: 14 U/L (ref 10–40)
ANION GAP SERPL CALCULATED.3IONS-SCNC: 10 MMOL/L (ref 9–17)
AST SERPL-CCNC: 34 U/L (ref 15–37)
BASOPHILS # BLD: 0.02 K/UL
BASOPHILS NFR BLD: 0 % (ref 0–1)
BILIRUB SERPL-MCNC: 0.7 MG/DL (ref 0–1)
BILIRUB UR QL STRIP: NEGATIVE
BUN SERPL-MCNC: 7 MG/DL (ref 7–20)
CALCIUM SERPL-MCNC: 9.4 MG/DL (ref 8.3–10.6)
CHLORIDE SERPL-SCNC: 104 MMOL/L (ref 99–110)
CLARITY UR: CLEAR
CO2 SERPL-SCNC: 27 MMOL/L (ref 21–32)
COLOR UR: YELLOW
COMMENT: NORMAL
CREAT SERPL-MCNC: 1 MG/DL (ref 0.9–1.3)
EOSINOPHIL # BLD: 0.21 K/UL
EOSINOPHILS RELATIVE PERCENT: 3 % (ref 0–3)
ERYTHROCYTE [DISTWIDTH] IN BLOOD BY AUTOMATED COUNT: 11.9 % (ref 11.7–14.9)
GFR, ESTIMATED: 85 ML/MIN/1.73M2
GLUCOSE SERPL-MCNC: 102 MG/DL (ref 74–99)
GLUCOSE UR STRIP-MCNC: NEGATIVE MG/DL
HCT VFR BLD AUTO: 44.4 % (ref 42–52)
HGB BLD-MCNC: 14.8 G/DL (ref 13.5–18)
HGB UR QL STRIP.AUTO: NEGATIVE
IMM GRANULOCYTES # BLD AUTO: 0.02 K/UL
IMM GRANULOCYTES NFR BLD: 0 %
KETONES UR STRIP-MCNC: NEGATIVE MG/DL
LEUKOCYTE ESTERASE UR QL STRIP: NEGATIVE
LIPASE SERPL-CCNC: 27 U/L (ref 13–60)
LYMPHOCYTES NFR BLD: 1.01 K/UL
LYMPHOCYTES RELATIVE PERCENT: 16 % (ref 24–44)
MCH RBC QN AUTO: 31 PG (ref 27–31)
MCHC RBC AUTO-ENTMCNC: 33.3 G/DL (ref 32–36)
MCV RBC AUTO: 93.1 FL (ref 78–100)
MONOCYTES NFR BLD: 0.52 K/UL
MONOCYTES NFR BLD: 8 % (ref 0–4)
NEUTROPHILS NFR BLD: 72 % (ref 36–66)
NEUTS SEG NFR BLD: 4.5 K/UL
NITRITE UR QL STRIP: NEGATIVE
PH UR STRIP: 6.5 [PH] (ref 5–8)
PLATELET # BLD AUTO: 189 K/UL (ref 140–440)
PMV BLD AUTO: 9.6 FL (ref 7.5–11.1)
POTASSIUM SERPL-SCNC: 4.9 MMOL/L (ref 3.5–5.1)
PROT SERPL-MCNC: 7.2 G/DL (ref 6.4–8.2)
PROT UR STRIP-MCNC: NEGATIVE MG/DL
RBC # BLD AUTO: 4.77 M/UL (ref 4.6–6.2)
SODIUM SERPL-SCNC: 141 MMOL/L (ref 136–145)
SP GR UR STRIP: 1.01 (ref 1–1.03)
UROBILINOGEN UR STRIP-ACNC: 1 EU/DL (ref 0–1)
WBC OTHER # BLD: 6.3 K/UL (ref 4–10.5)

## 2025-01-10 PROCEDURE — 99284 EMERGENCY DEPT VISIT MOD MDM: CPT

## 2025-01-10 PROCEDURE — 85025 COMPLETE CBC W/AUTO DIFF WBC: CPT

## 2025-01-10 PROCEDURE — 96372 THER/PROPH/DIAG INJ SC/IM: CPT

## 2025-01-10 PROCEDURE — 83690 ASSAY OF LIPASE: CPT

## 2025-01-10 PROCEDURE — 80053 COMPREHEN METABOLIC PANEL: CPT

## 2025-01-10 PROCEDURE — 6360000002 HC RX W HCPCS: Performed by: EMERGENCY MEDICINE

## 2025-01-10 PROCEDURE — 81003 URINALYSIS AUTO W/O SCOPE: CPT

## 2025-01-10 RX ORDER — KETOROLAC TROMETHAMINE 15 MG/ML
30 INJECTION, SOLUTION INTRAMUSCULAR; INTRAVENOUS ONCE
Status: COMPLETED | OUTPATIENT
Start: 2025-01-10 | End: 2025-01-10

## 2025-01-10 RX ORDER — DOCUSATE SODIUM 100 MG/1
100 CAPSULE, LIQUID FILLED ORAL 2 TIMES DAILY
Qty: 30 CAPSULE | Refills: 0 | Status: SHIPPED | OUTPATIENT
Start: 2025-01-10 | End: 2025-01-11

## 2025-01-10 RX ORDER — DICYCLOMINE HYDROCHLORIDE 10 MG/ML
10 INJECTION INTRAMUSCULAR ONCE
Status: COMPLETED | OUTPATIENT
Start: 2025-01-10 | End: 2025-01-10

## 2025-01-10 RX ORDER — POLYETHYLENE GLYCOL 3350 17 G/17G
17 POWDER, FOR SOLUTION ORAL DAILY
Qty: 510 G | Refills: 0 | Status: SHIPPED | OUTPATIENT
Start: 2025-01-10 | End: 2025-02-09

## 2025-01-10 RX ADMIN — KETOROLAC TROMETHAMINE 30 MG: 15 INJECTION, SOLUTION INTRAMUSCULAR; INTRAVENOUS at 08:39

## 2025-01-10 RX ADMIN — DICYCLOMINE HYDROCHLORIDE 10 MG: 10 INJECTION, SOLUTION INTRAMUSCULAR at 08:39

## 2025-01-10 SDOH — ECONOMIC STABILITY - HOUSING INSECURITY: HOMELESSNESS UNSPECIFIED: Z59.00

## 2025-01-10 ASSESSMENT — PAIN SCALES - GENERAL: PAINLEVEL_OUTOF10: 10

## 2025-01-10 ASSESSMENT — PAIN DESCRIPTION - LOCATION: LOCATION: ABDOMEN

## 2025-01-10 NOTE — DISCHARGE INSTRUCTIONS
Substance abuse       [facility]       Outpatient: [facility]: [facility] EMERGENCY SHELTERS - INDIVIDUALS   Homeless Coalition: [phone]     [facility] ---------------------------------------- [phone] (SAFE) [address] [place] [address]; [place], [place]   Website: http://www.[facility].org/* ? Offers shelter to sixteen men, with most stays limited to eight weeks ? Provides three meals a day. The men must have goals, work on those goals, and start a savings account within three weeks ? All who live in the house cook meals, house-sit, clean, and take responsibility for themselves and one another    [facility] --------------------------------------- [phone] [address] [place] [address]; [place] [address]   Website: http://www.homelessrespite.org/* ? In-patient medical services for homeless adult men and women (no families) ? 15 beds for persons recovering from hospital surgery, etc. Length of stay: 30 days   [facility] ---------------------------------------------- [phone] [address] [place] [address]; [place] [address] Website: http://www.[facility].org/* ? $15 shelter fee per week (provides bed, locker, breakfast, and dinner) ? First come, first serve; doors open at 6:15pm ? First time guests who are not able to cover the shelter fee are welcomed, and a plan for them to earn the fee is put into action ? 34 beds for homeless, sober men   [facility]---------------------------------------------------- [phone] [address]; [place] [address]   Website: http://www.[facility].org/* ? Emergency shelter for men or women; [facility] [facility] [place] beds for men ? Must be at the shelter before 9pm to complete an intake prior to admission ? Must have ID prior to admission or have a  call DIC to verify your identity   [facility])

## 2025-01-10 NOTE — ED PROVIDER NOTES
[facility]    Emergency Department Encounter      Patient: Mario Orellana  MRN: [id number]  : 1990  Date of Evaluation:1/10/2025  ED Provider:  Chelita Sharma DO    Triage Chief Complaint:   Abdominal Pain    [place]:  Mario Orellana is a 34 y.o. male who  has a past medical history of Bipolar 1 disorder (HCC), Chronic generalized abdominal pain, Depression, GERD (gastroesophageal reflux disease), IBS (irritable bowel syndrome), Mental impairment, OCD (obsessive compulsive disorder), and Schizo affective schizophrenia (HCC). and presents with recurrent abdominal pain.  He has not been able to get his medication for constipation. He would like toradol and bentyl. He is not vomiting.      ROS - see HPI, below listed is current ROS at time of my eval:   systems reviewed and negative except as above.     Past Medical History:   Diagnosis Date    Bipolar 1 disorder (HCC)     Chronic generalized abdominal pain     Depression     GERD (gastroesophageal reflux disease)     IBS (irritable bowel syndrome)     Mental impairment     OCD (obsessive compulsive disorder)     Schizo affective schizophrenia (HCC)      Past Surgical History:   Procedure Laterality Date    COLONOSCOPY      does not know    EGD      does not know     Family History   Problem Relation Age of Onset    Colon Cancer Neg Hx     Esophageal Cancer Neg Hx     Liver Cancer Neg Hx     Rectal Cancer Neg Hx     Stomach Cancer Neg Hx      Social History     Socioeconomic History    Marital status: Unknown     Spouse name: Not on file    Number of children: Not on file    Years of education: Not on file    Highest education level: Not on file   Occupational History    Not on file   Tobacco Use    Smoking status: Never    Smokeless tobacco: Never   Vaping Use    Vaping status: Never Used   Substance and Sexual Activity    Alcohol use: Never    Drug use: Never    Sexual activity: Not on file   Other Topics Concern    Not on file   Social  1.1 - 2.2    Total Bilirubin 0.7 0.0 - 1.0 mg/dL    Alkaline Phosphatase 77 40 - 129 U/L    ALT 14 10 - 40 U/L    AST 34 15 - 37 U/L   Lipase   Result Value Ref Range    Lipase 27 13 - 60 U/L   Urinalysis   Result Value Ref Range    Color, UA Yellow Yellow    Turbidity UA Clear Clear    Glucose, Ur NEGATIVE NEGATIVE mg/dL    Bilirubin, Urine NEGATIVE NEGATIVE    Ketones, Urine NEGATIVE NEGATIVE mg/dL    Specific Gravity, UA 1.015 1.005 - 1.030    Urine Hgb NEGATIVE NEGATIVE    pH, Urine 6.5 5.0 - 8.0    Protein, UA NEGATIVE NEGATIVE mg/dL    Urobilinogen, Urine 1.0 0.0 - 1.0 EU/dL    Nitrite, Urine NEGATIVE NEGATIVE    Leukocyte Esterase, Urine NEGATIVE NEGATIVE    Comment       Microscopic exam not performed based on chemical results unless requested in original order.        Radiographs (if obtained):  Radiologist's Report Reviewed:  No results found.  No orders to display         Ordered and reviewed diagnostic studies     ED Course as of 01/11/25 1033   Fri Deuce 10, 2025   0853 Nitrite, Urine: NEGATIVE [CB]   0853 Leukocyte Esterase, Urine: NEGATIVE [CB]   0855 WBC: 6.3 [CB]   0855 Hemoglobin Quant: 14.8 [CB]      ED Course User Index  [CB] Chelita Sharma DO       Patient was given the following medications:  Medications   ketorolac (TORADOL) injection 30 mg (30 mg IntraMUSCular Given 1/10/25 0839)   dicyclomine (BENTYL) injection 10 mg (10 mg IntraMUSCular Given 1/10/25 0839)       Discussion with Other Profesionals : None    History from : Patient    Limitations to history : None      EKG (if obtained): (All EKG's are interpreted by myself in the absence of a cardiologist)   See ED Course     Chronic conditions affecting care: Mario Orellana  has a past medical history of Bipolar 1 disorder (HCC), Chronic generalized abdominal pain, Depression, GERD (gastroesophageal reflux disease), IBS (irritable bowel syndrome), Mental impairment, OCD (obsessive compulsive disorder), and Schizo affective schizophrenia

## 2025-01-11 ENCOUNTER — HOSPITAL ENCOUNTER (EMERGENCY)
Age: 35
Discharge: HOME | End: 2025-01-11
Payer: MEDICARE

## 2025-01-11 VITALS
TEMPERATURE: 97.6 F | SYSTOLIC BLOOD PRESSURE: 116 MMHG | RESPIRATION RATE: 16 BRPM | HEART RATE: 106 BPM | WEIGHT: 146 LBS | BODY MASS INDEX: 25.86 KG/M2 | DIASTOLIC BLOOD PRESSURE: 79 MMHG | OXYGEN SATURATION: 94 %

## 2025-01-11 DIAGNOSIS — R10.84 GENERALIZED ABDOMINAL PAIN: ICD-10-CM

## 2025-01-11 DIAGNOSIS — K59.09 CHRONIC CONSTIPATION: Primary | ICD-10-CM

## 2025-01-11 LAB
ALBUMIN SERPL-MCNC: 4.5 G/DL (ref 3.4–5)
ALBUMIN/GLOB SERPL: 1.5 {RATIO} (ref 1.1–2.2)
ALP SERPL-CCNC: 76 U/L (ref 40–129)
ALT SERPL-CCNC: 12 U/L (ref 10–40)
ANION GAP SERPL CALCULATED.3IONS-SCNC: 12 MMOL/L (ref 9–17)
AST SERPL-CCNC: 30 U/L (ref 15–37)
BASOPHILS # BLD: 0.02 K/UL
BASOPHILS NFR BLD: 1 % (ref 0–1)
BILIRUB SERPL-MCNC: 0.6 MG/DL (ref 0–1)
BILIRUB UR QL STRIP: NEGATIVE
BUN SERPL-MCNC: 10 MG/DL (ref 7–20)
CALCIUM SERPL-MCNC: 9.5 MG/DL (ref 8.3–10.6)
CHLORIDE SERPL-SCNC: 103 MMOL/L (ref 99–110)
CLARITY UR: CLEAR
CO2 SERPL-SCNC: 29 MMOL/L (ref 21–32)
COLOR UR: YELLOW
COMMENT: NORMAL
CREAT SERPL-MCNC: 1.1 MG/DL (ref 0.9–1.3)
EOSINOPHIL # BLD: 0.17 K/UL
EOSINOPHILS RELATIVE PERCENT: 4 % (ref 0–3)
ERYTHROCYTE [DISTWIDTH] IN BLOOD BY AUTOMATED COUNT: 11.9 % (ref 11.7–14.9)
GFR, ESTIMATED: 79 ML/MIN/1.73M2
GLUCOSE SERPL-MCNC: 124 MG/DL (ref 74–99)
GLUCOSE UR STRIP-MCNC: NEGATIVE MG/DL
HCT VFR BLD AUTO: 46 % (ref 42–52)
HGB BLD-MCNC: 15.1 G/DL (ref 13.5–18)
HGB UR QL STRIP.AUTO: NEGATIVE
IMM GRANULOCYTES # BLD AUTO: 0.01 K/UL
IMM GRANULOCYTES NFR BLD: 0 %
KETONES UR STRIP-MCNC: NEGATIVE MG/DL
LEUKOCYTE ESTERASE UR QL STRIP: NEGATIVE
LIPASE SERPL-CCNC: 31 U/L (ref 13–60)
LYMPHOCYTES NFR BLD: 1.05 K/UL
LYMPHOCYTES RELATIVE PERCENT: 25 % (ref 24–44)
MCH RBC QN AUTO: 30.8 PG (ref 27–31)
MCHC RBC AUTO-ENTMCNC: 32.8 G/DL (ref 32–36)
MCV RBC AUTO: 93.7 FL (ref 78–100)
MONOCYTES NFR BLD: 0.39 K/UL
MONOCYTES NFR BLD: 9 % (ref 0–4)
NEUTROPHILS NFR BLD: 61 % (ref 36–66)
NEUTS SEG NFR BLD: 2.51 K/UL
NITRITE UR QL STRIP: NEGATIVE
PH UR STRIP: 6 [PH] (ref 5–8)
PLATELET # BLD AUTO: 205 K/UL (ref 140–440)
PMV BLD AUTO: 9.9 FL (ref 7.5–11.1)
POTASSIUM SERPL-SCNC: 4.3 MMOL/L (ref 3.5–5.1)
PROT SERPL-MCNC: 7.5 G/DL (ref 6.4–8.2)
PROT UR STRIP-MCNC: NEGATIVE MG/DL
RBC # BLD AUTO: 4.91 M/UL (ref 4.6–6.2)
SODIUM SERPL-SCNC: 144 MMOL/L (ref 136–145)
SP GR UR STRIP: 1.01 (ref 1–1.03)
UROBILINOGEN UR STRIP-ACNC: 0.2 EU/DL (ref 0–1)
WBC OTHER # BLD: 4.2 K/UL (ref 4–10.5)

## 2025-01-11 PROCEDURE — 6360000002 HC RX W HCPCS

## 2025-01-11 PROCEDURE — 6370000000 HC RX 637 (ALT 250 FOR IP)

## 2025-01-11 PROCEDURE — 83690 ASSAY OF LIPASE: CPT

## 2025-01-11 PROCEDURE — 99284 EMERGENCY DEPT VISIT MOD MDM: CPT

## 2025-01-11 PROCEDURE — 81003 URINALYSIS AUTO W/O SCOPE: CPT

## 2025-01-11 PROCEDURE — 96372 THER/PROPH/DIAG INJ SC/IM: CPT

## 2025-01-11 PROCEDURE — 80053 COMPREHEN METABOLIC PANEL: CPT

## 2025-01-11 PROCEDURE — 85025 COMPLETE CBC W/AUTO DIFF WBC: CPT

## 2025-01-11 RX ORDER — DICYCLOMINE HYDROCHLORIDE 10 MG/ML
20 INJECTION INTRAMUSCULAR ONCE
Status: COMPLETED | OUTPATIENT
Start: 2025-01-11 | End: 2025-01-11

## 2025-01-11 RX ORDER — KETOROLAC TROMETHAMINE 15 MG/ML
30 INJECTION, SOLUTION INTRAMUSCULAR; INTRAVENOUS ONCE
Status: COMPLETED | OUTPATIENT
Start: 2025-01-11 | End: 2025-01-11

## 2025-01-11 RX ORDER — FAMOTIDINE 20 MG/1
20 TABLET, FILM COATED ORAL ONCE
Status: COMPLETED | OUTPATIENT
Start: 2025-01-11 | End: 2025-01-11

## 2025-01-11 RX ORDER — DOCUSATE SODIUM 100 MG/1
100 CAPSULE, LIQUID FILLED ORAL 2 TIMES DAILY
Qty: 60 CAPSULE | Refills: 0 | Status: SHIPPED | OUTPATIENT
Start: 2025-01-11 | End: 2025-02-10

## 2025-01-11 RX ORDER — FAMOTIDINE 20 MG/1
20 TABLET, FILM COATED ORAL 2 TIMES DAILY
Qty: 28 TABLET | Refills: 0 | Status: SHIPPED | OUTPATIENT
Start: 2025-01-11 | End: 2025-01-25

## 2025-01-11 RX ADMIN — DICYCLOMINE HYDROCHLORIDE 20 MG: 10 INJECTION, SOLUTION INTRAMUSCULAR at 10:19

## 2025-01-11 RX ADMIN — FAMOTIDINE 20 MG: 20 TABLET, FILM COATED ORAL at 10:20

## 2025-01-11 RX ADMIN — KETOROLAC TROMETHAMINE 30 MG: 15 INJECTION, SOLUTION INTRAMUSCULAR; INTRAVENOUS at 10:19

## 2025-01-11 ASSESSMENT — PAIN - FUNCTIONAL ASSESSMENT: PAIN_FUNCTIONAL_ASSESSMENT: 0-10

## 2025-01-11 ASSESSMENT — PAIN SCALES - GENERAL: PAINLEVEL_OUTOF10: 10

## 2025-01-11 ASSESSMENT — PAIN DESCRIPTION - LOCATION: LOCATION: ABDOMEN

## 2025-01-11 NOTE — ED PROVIDER NOTES
[facility] EMERGENCY DEPARTMENT  EMERGENCY DEPARTMENT ENCOUNTER        Pt Name: Mario Orellana  MRN: [id number]  Birthdate 1990  Date of evaluation: 1/11/2025  Provider: ROSE Rosas CNP  PCP: No primary care provider on file.  Note Started: 9:02 AM EST 1/11/25      TALA. I have evaluated this patient.        CHIEF COMPLAINT       Chief Complaint   Patient presents with    Abdominal Pain     Cramping, onset months ago, 10/10       HISTORY OF PRESENT ILLNESS: 1 or more Elements     History From: Patient    Limitations to history : None    Social Determinants Significantly Affecting Health : Patient is Homeless    Chief Complaint: Abdominal cramping bowel movements    Mario Orellana is a 34 y.o. male history of depression chronic generalized abdominal pain OCD schizoaffective IBS who presents to ED stating his chronic abdominal pain has continued.  He states the Colace that he has been taking has been helping.  He states he was able to have 2 bowel movements.  He states he has been able to eat and drink but has not had access to eat or drink today.  Denies any fevers cough congestion or upper respiratory symptoms.  Denies any new abdominal pain or chest pain.  Denies any dysuria or hematuria.  Denies any dark-colored stools.    Nursing Notes were all reviewed and agreed with or any disagreements were addressed in the HPI.    REVIEW OF SYSTEMS :      Review of Systems    Positives and Pertinent negatives as per HPI.     SURGICAL HISTORY     Past Surgical History:   Procedure Laterality Date    COLONOSCOPY      does not know    EGD      does not know       CURRENTMEDICATIONS       Previous Medications    DICYCLOMINE (BENTYL) 10 MG CAPSULE    Take 1 capsule by mouth 4 times daily (before meals and nightly)    IBUPROFEN (ADVIL;MOTRIN) 600 MG TABLET    Take 1 tablet by mouth 3 times daily as needed for Pain    ONDANSETRON (ZOFRAN-ODT) 4 MG DISINTEGRATING TABLET    Take 1 tablet by mouth

## 2025-01-12 ENCOUNTER — HOSPITAL ENCOUNTER (EMERGENCY)
Age: 35
Discharge: HOME | End: 2025-01-12
Attending: STUDENT IN AN ORGANIZED HEALTH CARE EDUCATION/TRAINING PROGRAM
Payer: MEDICARE

## 2025-01-12 ENCOUNTER — HOSPITAL ENCOUNTER (EMERGENCY)
Age: 35
Discharge: HOME | End: 2025-01-12
Payer: MEDICARE

## 2025-01-12 VITALS
RESPIRATION RATE: 19 BRPM | SYSTOLIC BLOOD PRESSURE: 123 MMHG | TEMPERATURE: 99 F | HEART RATE: 100 BPM | OXYGEN SATURATION: 96 % | DIASTOLIC BLOOD PRESSURE: 88 MMHG

## 2025-01-12 VITALS
SYSTOLIC BLOOD PRESSURE: 126 MMHG | RESPIRATION RATE: 18 BRPM | TEMPERATURE: 97.6 F | OXYGEN SATURATION: 100 % | HEART RATE: 98 BPM | DIASTOLIC BLOOD PRESSURE: 80 MMHG

## 2025-01-12 DIAGNOSIS — G89.29 CHRONIC ABDOMINAL PAIN: Primary | ICD-10-CM

## 2025-01-12 DIAGNOSIS — Z59.00 HOMELESSNESS: Primary | ICD-10-CM

## 2025-01-12 DIAGNOSIS — G89.29 CHRONIC ABDOMINAL PAIN: ICD-10-CM

## 2025-01-12 DIAGNOSIS — R10.9 CHRONIC ABDOMINAL PAIN: Primary | ICD-10-CM

## 2025-01-12 DIAGNOSIS — Z59.00 HOMELESSNESS: ICD-10-CM

## 2025-01-12 DIAGNOSIS — R10.9 CHRONIC ABDOMINAL PAIN: ICD-10-CM

## 2025-01-12 PROCEDURE — 6360000002 HC RX W HCPCS: Performed by: STUDENT IN AN ORGANIZED HEALTH CARE EDUCATION/TRAINING PROGRAM

## 2025-01-12 PROCEDURE — 99283 EMERGENCY DEPT VISIT LOW MDM: CPT

## 2025-01-12 PROCEDURE — 99284 EMERGENCY DEPT VISIT MOD MDM: CPT

## 2025-01-12 PROCEDURE — 6370000000 HC RX 637 (ALT 250 FOR IP): Performed by: STUDENT IN AN ORGANIZED HEALTH CARE EDUCATION/TRAINING PROGRAM

## 2025-01-12 PROCEDURE — 6370000000 HC RX 637 (ALT 250 FOR IP): Performed by: NURSE PRACTITIONER

## 2025-01-12 PROCEDURE — 96372 THER/PROPH/DIAG INJ SC/IM: CPT

## 2025-01-12 RX ORDER — ONDANSETRON 4 MG/1
4 TABLET, ORALLY DISINTEGRATING ORAL ONCE
Status: COMPLETED | OUTPATIENT
Start: 2025-01-12 | End: 2025-01-12

## 2025-01-12 RX ORDER — DICYCLOMINE HYDROCHLORIDE 10 MG/ML
20 INJECTION INTRAMUSCULAR ONCE
Status: COMPLETED | OUTPATIENT
Start: 2025-01-12 | End: 2025-01-12

## 2025-01-12 RX ORDER — KETOROLAC TROMETHAMINE 15 MG/ML
15 INJECTION, SOLUTION INTRAMUSCULAR; INTRAVENOUS ONCE
Status: COMPLETED | OUTPATIENT
Start: 2025-01-12 | End: 2025-01-12

## 2025-01-12 RX ORDER — DICYCLOMINE HCL 20 MG
20 TABLET ORAL ONCE
Status: COMPLETED | OUTPATIENT
Start: 2025-01-12 | End: 2025-01-12

## 2025-01-12 RX ADMIN — KETOROLAC TROMETHAMINE 15 MG: 15 INJECTION, SOLUTION INTRAMUSCULAR; INTRAVENOUS at 18:01

## 2025-01-12 RX ADMIN — DICYCLOMINE HYDROCHLORIDE 20 MG: 10 INJECTION, SOLUTION INTRAMUSCULAR at 18:01

## 2025-01-12 RX ADMIN — ONDANSETRON 4 MG: 4 TABLET, ORALLY DISINTEGRATING ORAL at 09:55

## 2025-01-12 RX ADMIN — ONDANSETRON 4 MG: 4 TABLET, ORALLY DISINTEGRATING ORAL at 18:01

## 2025-01-12 RX ADMIN — DICYCLOMINE HYDROCHLORIDE 20 MG: 20 TABLET ORAL at 09:19

## 2025-01-12 SDOH — ECONOMIC STABILITY - HOUSING INSECURITY: HOMELESSNESS UNSPECIFIED: Z59.00

## 2025-01-12 ASSESSMENT — PAIN DESCRIPTION - LOCATION
LOCATION: ABDOMEN

## 2025-01-12 ASSESSMENT — PAIN SCALES - GENERAL
PAINLEVEL_OUTOF10: 9
PAINLEVEL_OUTOF10: 10

## 2025-01-12 ASSESSMENT — PAIN - FUNCTIONAL ASSESSMENT
PAIN_FUNCTIONAL_ASSESSMENT: ACTIVITIES ARE NOT PREVENTED
PAIN_FUNCTIONAL_ASSESSMENT: 0-10

## 2025-01-12 ASSESSMENT — PAIN DESCRIPTION - DESCRIPTORS: DESCRIPTORS: ACHING

## 2025-01-12 NOTE — ED NOTES
Patient discharged. He was given food, and drinks. Patient given list of shelters and he is aware of where the soup kitchen is. He is alert and oriented, skin is warm, dry, pink.

## 2025-01-12 NOTE — ED PROVIDER NOTES
and interpreted all of the currently available lab results from this visit (if applicable):    Radiographs (if obtained):  Radiologist's Report Reviewed:  No orders to display       LABS: (none if blank)  Labs Reviewed - No data to display    FINAL IMPRESSION      Final diagnoses:   Chronic abdominal pain   Homelessness     Condition: stable  Dispo: Discharge      This transcription was electronically signed. Parts of this transcriptions may have been dictated by use of voice recognition software and electronically transcribed, and parts may have been transcribed with the assistance of an ED scribe and may contain errors related to that system including errors in grammar, punctuation, and spelling, as well as words and phrases that may be inappropriate.  The transcription may contain errors not detected in proofreading.  Efforts were made to edit the dictations.    Electronically Signed: Braulio Pineda MD, 01/12/25, 6:24 PM    I am the Primary Clinician of Record.      Clinical Impression:  1. Chronic abdominal pain    2. Homelessness      Disposition referral (if applicable):  EUGENIO ALVARADO [address] [place] [id number]  [phone]        Disposition medications (if applicable):  New Prescriptions    No medications on file     ED Provider Disposition Time  DISPOSITION Discharge - Pending Orders Complete 01/12/2025 05:40:34 PM   DISPOSITION CONDITION Stable                 Braulio Pineda MD  01/12/25 6170

## 2025-01-12 NOTE — ED PROVIDER NOTES
12/9/2024. TECHNIQUE: Helical CT imaging of the abdomen and pelvis was performed with intravenous contrast. Multiplanar reformats were generated. One or more dose reduction techniques were used on this CT: automated exposure control, adjustment of the mAs and/or kVp according to patient size, and iterative reconstruction techniques.  FINDINGS: LOWER CHEST: Unremarkable. LIVER: Tiny scattered cysts. BILIARY/GALLBLADDER: Unremarkable. SPLEEN: Splenule noted. PANCREAS: Unremarkable. ADRENAL GLANDS: Unremarkable. KIDNEYS: Unremarkable. BLADDER: Unremarkable. REPRODUCTIVE: Unremarkable. BOWEL: Moderate volume stool. No bowel obstruction. Appendix is normal. GREAT VESSELS:  Unremarkable for age. LYMPH NODES: Unremarkable. BODY WALL/ MSK: Minimal stranding in the bilateral gluteal soft tissues with scattered areas of scarring.     No acute abdominal or pelvic findings. Electronically signed by Skip Ng      Vitals:    Vitals:    01/12/25 0821   BP: 124/83   Pulse: 100   Resp: 18   Temp: 97.6 °F (36.4 °C)   TempSrc: Oral   SpO2: 100%        Patient seen and examined.  Work-up initiated secondary to presentation, physical exam findings, vital signs and medical chart review. Emergent etiologies considered.    Mario Orellana is a 34 y.o. male who present to the emergency center abdominal pain and requesting food as noted above.   Pt has easy nonlabored respirations.  Vital signs within acceptable parameters.  Patient is afebrile and in no acute distress. Pt hemodynamically stable and neurovascularly intact.  His exam is benign and there is no abdominal discomfort on palpation.  No concern appendicitis, cholecystitis or bowel obstruction.  He admits that he is hungry and has not eaten in over 24 hours secondary to the weather in many places being closed.  Patient is homeless and currently staying at the police department allegedly.  He admits that he is mainly here because he is hungry and feels like food would help him

## 2025-01-12 NOTE — CARE COORDINATION
RITA review of pt chart for discharge needs, pt is chronically homeless and is ER for the second time today.  CM has talked with Rev Perez [phone] who has [facility] in [place]. Rev Perez states pt has been [facility] the [facility], due to behavior issues.  [facility] check with [facility] On Monday during intake hours 9am-330pm. RADHA KRISHNAMURTHY/CM

## 2025-01-12 NOTE — DISCHARGE INSTRUCTIONS
www.[facility].org    24-Hour Crisis Hotline: 1-300.702.4621        [facility]  [address]  [place], [address]  [phone]   [email or web address]    Services:  Emergency housing 24 hours a day seven days a week, 365 day a year.    Hours (office):  Monday through Friday 8AM to 5PM        [facility]   (Adult Emergency Homeless Shelter)  [address] [place]  [address]  [phone]  [email or web address]        [facility]  [address] [place] [address]  [email or web address]://[facility]  Crisis Hotline:   (198) 580-6868  Administrative Offices:   (835) 598-4203    Services:  Offers emergency shelter, open 24/7, for youth ages 10 to 21 years        For additional information and referrals, dial 2-1-1.  Residents can obtain information at 2-1-1 about hospitals, doctors, the nearest food pantry, utility services, or a variety of other types of resources. The number is available for non-emergency assistance 24 hours a day.  Alternate Numbers:   [facility]:  [phone]   [facility]:  [phone]   [facility]:  [phone]   Toll-Free:  [phone]        You can use the resources below to find a new family doctor if needed:                                                Primary Care Physicians    [facility]    Dr. Lalo Eldridge MD  [facility]  [address]  [place], [place] [id number] [phone]    Lucia Hallman CNP  [facility]  [address]  [place], [place] [address] [phone]    [facility]    Roxana Eldridge MD  [facility] [address]  [place], [place] [address] [phone]      [facility].     James Piper MD  [address]  [place], [place] [address] [phone]    Mildred Rouse CNP  [address] [place] [address]  [phone]    Melissa Zuleta 
complains of pain/discomfort

## 2025-01-12 NOTE — DISCHARGE INSTRUCTIONS
[place]: [facility] EMERGENCY SHELTERS - INDIVIDUALS   Homeless Coalition: [phone]     [facility] ---------------------------------------- [phone] (SAFE) [facility]; [place], [place]   Website: http://www.[facility].org/* ? Offers shelter to sixteen men, with most stays limited to eight weeks ? Provides three meals a day. The men must have goals, work on those goals, and start a savings account within three weeks ? All who live in the house cook meals, house-sit, clean, and take responsibility for themselves and one another   [facility] --------------------------------------- [phone] [address] [place] [address]; [place] [address]   Website: http://www.homelessrespite.org/* ? In-patient medical services for homeless adult men and women (no families) ? 15 beds for persons recovering from hospital surgery, etc. Length of stay: 30 days   [facility] ---------------------------------------------- [phone] [address] [place] [address]; [place] [address] Website: http://www.[facility].org/* ? $15 shelter fee per week (provides bed, locker, breakfast, and dinner) ? First come, first serve; doors open at 6:15pm ? First time guests who are not able to cover the shelter fee are welcomed, and a plan for them to earn the fee is put into action ? 34 beds for homeless, sober men   [facility]---------------------------------------------------- [phone] [address]; [place] [address]   Website: http://www.[facility].org/* ? Emergency shelter for men or women; [facility] 15 [facility]o and [place] beds for men ? Must be at the shelter before 9pm to complete an intake prior to admission ? Must have ID prior to admission or have a  call DIC to verify your identity   [facility] ([facility]) ---------------------------[phone] ext. [phone] [address] [place] [address]; [place] [address]   Website: [email or web address]/*   ? 6 month residential

## 2025-01-13 ENCOUNTER — HOSPITAL ENCOUNTER (EMERGENCY)
Age: 35
Discharge: LEFT BEFORE DISCHARGE | End: 2025-01-13

## 2025-01-13 VITALS
DIASTOLIC BLOOD PRESSURE: 91 MMHG | SYSTOLIC BLOOD PRESSURE: 128 MMHG | RESPIRATION RATE: 18 BRPM | OXYGEN SATURATION: 94 % | TEMPERATURE: 99.3 F | HEART RATE: 124 BPM

## 2025-01-13 NOTE — CARE COORDINATION
Sgt Merlin France from [employer] called this CM to see about gettting assistance with patient as he is coming in to the ED frequently stating that he is homeless and hungry. CM explained that patient is on a Management Plan. CM also shared that patient was just in yesterday and case management found out the patient has been banned from [facility] for behaviors on [address] [place], [place] -- [facility]. CM spoke to the  Chris @ [phone] who provided information.     CM emailed Merlin the following: All the Shelters in the area and contact information, All the [facility] in the area and contact information, and all the Food Pantries and Hot Food in area as well as [facility] [place] [facility] and Food [facility] information. Merlin is going to also work with patient regarding the shelters and pantries as needed. Patient has also told Merlin that he needs help with getting his medications as he is unable to get them. Patient does have insurance but is not showing Medicaid. CM explained that patient may qualify for Medicaid since he is homeless. Patient will need to go to [facility] to sign up or request some of the shelters that utilize case management to assist patient with this. CM also explained that the hospital will assist with coverage through Med Assist should patient have a new medication that he needs assistance with. CM did look up patients' name on list and patient is not on flagged list.

## 2025-01-14 ENCOUNTER — HOSPITAL ENCOUNTER (EMERGENCY)
Age: 35
Discharge: HOME | End: 2025-01-14
Attending: EMERGENCY MEDICINE
Payer: MEDICARE

## 2025-01-14 VITALS
HEIGHT: 63 IN | SYSTOLIC BLOOD PRESSURE: 125 MMHG | HEART RATE: 98 BPM | DIASTOLIC BLOOD PRESSURE: 97 MMHG | RESPIRATION RATE: 16 BRPM | BODY MASS INDEX: 25.87 KG/M2 | WEIGHT: 146 LBS | OXYGEN SATURATION: 97 % | TEMPERATURE: 98.1 F

## 2025-01-14 DIAGNOSIS — Z59.82 TRANSPORTATION INSECURITY: ICD-10-CM

## 2025-01-14 DIAGNOSIS — G89.29 CHRONIC ABDOMINAL PAIN: ICD-10-CM

## 2025-01-14 DIAGNOSIS — R10.9 CHRONIC ABDOMINAL PAIN: ICD-10-CM

## 2025-01-14 DIAGNOSIS — Z59.00 HOMELESSNESS: Primary | ICD-10-CM

## 2025-01-14 PROCEDURE — 6370000000 HC RX 637 (ALT 250 FOR IP): Performed by: EMERGENCY MEDICINE

## 2025-01-14 PROCEDURE — 99283 EMERGENCY DEPT VISIT LOW MDM: CPT

## 2025-01-14 RX ORDER — NAPROXEN 250 MG/1
250 TABLET ORAL ONCE
Status: COMPLETED | OUTPATIENT
Start: 2025-01-14 | End: 2025-01-14

## 2025-01-14 RX ORDER — DICYCLOMINE HCL 20 MG
20 TABLET ORAL ONCE
Status: COMPLETED | OUTPATIENT
Start: 2025-01-14 | End: 2025-01-14

## 2025-01-14 RX ADMIN — NAPROXEN 250 MG: 250 TABLET ORAL at 09:21

## 2025-01-14 RX ADMIN — DICYCLOMINE HYDROCHLORIDE 20 MG: 20 TABLET ORAL at 09:21

## 2025-01-14 SDOH — ECONOMIC STABILITY - TRANSPORTATION SECURITY: TRANSPORTATION INSECURITY: Z59.82

## 2025-01-14 SDOH — ECONOMIC STABILITY - HOUSING INSECURITY: HOMELESSNESS UNSPECIFIED: Z59.00

## 2025-01-14 ASSESSMENT — PAIN SCALES - GENERAL
PAINLEVEL_OUTOF10: 0
PAINLEVEL_OUTOF10: 10
PAINLEVEL_OUTOF10: 0

## 2025-01-14 ASSESSMENT — PAIN DESCRIPTION - LOCATION: LOCATION: ABDOMEN

## 2025-01-14 ASSESSMENT — PAIN - FUNCTIONAL ASSESSMENT: PAIN_FUNCTIONAL_ASSESSMENT: NONE - DENIES PAIN

## 2025-01-14 NOTE — ED PROVIDER NOTES
to 4 times per year     Marital Status: Never    Intimate Partner Violence: Not At Risk (6/1/2024)    Received from [facility]    Humiliation, Afraid, Rape, and Kick questionnaire     Fear of Current or Ex-Partner: No     Emotionally Abused: No     Physically Abused: No     Sexually Abused: No   Housing Stability: Unknown (1/23/2024)    [facility]    Housing/Utilities     Worried about losing home: Not on file     Stayed outside house: Not on file     Unable to get utilities: Not on file     Current Facility-Administered Medications   Medication Dose Route Frequency Provider Last Rate Last Admin    naproxen (NAPROSYN) tablet 250 mg  250 mg Oral Once Judit Chaparro MD        dicyclomine (BENTYL) tablet 20 mg  20 mg Oral Once Juidt Chaparro MD         Current Outpatient Medications   Medication Sig Dispense Refill    docusate sodium (COLACE) 100 MG capsule Take 1 capsule by mouth 2 times daily (Patient not taking: Reported on 1/14/2025) 60 capsule 0    famotidine (PEPCID) 20 MG tablet Take 1 tablet by mouth 2 times daily for 14 days (Patient not taking: Reported on 1/14/2025) 28 tablet 0    polyethylene glycol (GLYCOLAX) 17 GM/SCOOP powder Take 17 g by mouth daily (Patient not taking: Reported on 1/14/2025) 510 g 0    dicyclomine (BENTYL) 10 MG capsule Take 1 capsule by mouth 4 times daily (before meals and nightly) (Patient not taking: Reported on 1/14/2025) 120 capsule 0    ibuprofen (ADVIL;MOTRIN) 600 MG tablet Take 1 tablet by mouth 3 times daily as needed for Pain (Patient not taking: Reported on 1/14/2025) 30 tablet 0    ondansetron (ZOFRAN-ODT) 4 MG disintegrating tablet Take 1 tablet by mouth 3 times daily as needed for Nausea or Vomiting (Patient not taking: Reported on 1/14/2025) 21 tablet 0     Allergies   Allergen Reactions    Latex     Adhesive Tape     Codeine     Iodine     Shellfish-Derived Products        Nursing Notes Reviewed    Physical

## 2025-01-14 NOTE — CARE COORDINATION
CM received consult for patient. Dr. Chaparro requesting that Case Management assist with patient get a ride to [place], [place].     CM out to waiting room and spoke to patient. Patient has already been seen and ready for discharge. CM discussed with patient that if hospital pays for him another ride and he wants to go to [place]; that patient will stay at [facility] and work through their case management system. Patient will need to stay in [facility] shelter.. CM explained that patient needs to stay at shelter and work through their program that assists with housing. Patient needs to stay at [facility] and if he returns to [facility] ED; patient will need to call his insurance for the transportation program upon discharge.

## 2025-01-14 NOTE — ED PROVIDER NOTES
I did not see or evaluate patient--he had eloped from the waiting room after triage.        Marilee Rees PALucilleC  01/13/25 1147

## 2025-01-29 ENCOUNTER — HOSPITAL ENCOUNTER (EMERGENCY)
Age: 35
Discharge: HOME | End: 2025-01-29
Payer: MEDICARE

## 2025-01-29 ENCOUNTER — HOSPITAL ENCOUNTER (EMERGENCY)
Age: 35
Discharge: HOME | End: 2025-01-29
Attending: EMERGENCY MEDICINE
Payer: MEDICARE

## 2025-01-29 VITALS
SYSTOLIC BLOOD PRESSURE: 109 MMHG | DIASTOLIC BLOOD PRESSURE: 81 MMHG | TEMPERATURE: 98.3 F | HEART RATE: 99 BPM | RESPIRATION RATE: 18 BRPM | OXYGEN SATURATION: 96 %

## 2025-01-29 VITALS
RESPIRATION RATE: 18 BRPM | HEART RATE: 109 BPM | SYSTOLIC BLOOD PRESSURE: 120 MMHG | OXYGEN SATURATION: 94 % | TEMPERATURE: 98.4 F | DIASTOLIC BLOOD PRESSURE: 78 MMHG

## 2025-01-29 DIAGNOSIS — K59.00 CONSTIPATION, UNSPECIFIED CONSTIPATION TYPE: Primary | ICD-10-CM

## 2025-01-29 DIAGNOSIS — Z59.00 HOMELESSNESS: ICD-10-CM

## 2025-01-29 DIAGNOSIS — Z76.0 ENCOUNTER FOR MEDICATION REFILL: ICD-10-CM

## 2025-01-29 DIAGNOSIS — R10.9 ABDOMINAL PAIN, UNSPECIFIED ABDOMINAL LOCATION: Primary | ICD-10-CM

## 2025-01-29 PROCEDURE — 99284 EMERGENCY DEPT VISIT MOD MDM: CPT

## 2025-01-29 PROCEDURE — 96372 THER/PROPH/DIAG INJ SC/IM: CPT

## 2025-01-29 PROCEDURE — 6370000000 HC RX 637 (ALT 250 FOR IP): Performed by: EMERGENCY MEDICINE

## 2025-01-29 PROCEDURE — 6360000002 HC RX W HCPCS: Performed by: EMERGENCY MEDICINE

## 2025-01-29 PROCEDURE — 6360000002 HC RX W HCPCS: Performed by: PHYSICIAN ASSISTANT

## 2025-01-29 RX ORDER — LIDOCAINE 4 G/G
1 PATCH TOPICAL DAILY
Qty: 30 PATCH | Refills: 0 | Status: SHIPPED | OUTPATIENT
Start: 2025-01-29 | End: 2025-02-28

## 2025-01-29 RX ORDER — KETOROLAC TROMETHAMINE 15 MG/ML
30 INJECTION, SOLUTION INTRAMUSCULAR; INTRAVENOUS ONCE
Status: COMPLETED | OUTPATIENT
Start: 2025-01-29 | End: 2025-01-29

## 2025-01-29 RX ORDER — DOCUSATE SODIUM 100 MG/1
100 CAPSULE, LIQUID FILLED ORAL 2 TIMES DAILY
Qty: 60 CAPSULE | Refills: 0 | Status: SHIPPED | OUTPATIENT
Start: 2025-01-29 | End: 2025-02-28

## 2025-01-29 RX ORDER — DICYCLOMINE HYDROCHLORIDE 10 MG/ML
20 INJECTION INTRAMUSCULAR ONCE
Status: COMPLETED | OUTPATIENT
Start: 2025-01-29 | End: 2025-01-29

## 2025-01-29 RX ORDER — DOCUSATE SODIUM 100 MG/1
100 CAPSULE, LIQUID FILLED ORAL ONCE
Status: COMPLETED | OUTPATIENT
Start: 2025-01-29 | End: 2025-01-29

## 2025-01-29 RX ORDER — DOCUSATE SODIUM 100 MG/1
100 CAPSULE, LIQUID FILLED ORAL 2 TIMES DAILY
Qty: 40 CAPSULE | Refills: 0 | Status: SHIPPED | OUTPATIENT
Start: 2025-01-29

## 2025-01-29 RX ADMIN — DICYCLOMINE HYDROCHLORIDE 20 MG: 20 INJECTION, SOLUTION INTRAMUSCULAR at 23:30

## 2025-01-29 RX ADMIN — KETOROLAC TROMETHAMINE 30 MG: 15 INJECTION, SOLUTION INTRAMUSCULAR; INTRAVENOUS at 16:06

## 2025-01-29 RX ADMIN — DOCUSATE SODIUM 100 MG: 100 CAPSULE, LIQUID FILLED ORAL at 16:05

## 2025-01-29 RX ADMIN — DICYCLOMINE HYDROCHLORIDE 20 MG: 10 INJECTION, SOLUTION INTRAMUSCULAR at 16:05

## 2025-01-29 RX ADMIN — KETOROLAC TROMETHAMINE 30 MG: 15 INJECTION, SOLUTION INTRAMUSCULAR; INTRAVENOUS at 23:30

## 2025-01-29 SDOH — ECONOMIC STABILITY - HOUSING INSECURITY: HOMELESSNESS UNSPECIFIED: Z59.00

## 2025-01-29 ASSESSMENT — LIFESTYLE VARIABLES
HOW MANY STANDARD DRINKS CONTAINING ALCOHOL DO YOU HAVE ON A TYPICAL DAY: PATIENT DOES NOT DRINK
HOW OFTEN DO YOU HAVE A DRINK CONTAINING ALCOHOL: NEVER
HOW MANY STANDARD DRINKS CONTAINING ALCOHOL DO YOU HAVE ON A TYPICAL DAY: PATIENT DOES NOT DRINK
HOW OFTEN DO YOU HAVE A DRINK CONTAINING ALCOHOL: NEVER

## 2025-01-29 ASSESSMENT — PAIN DESCRIPTION - DESCRIPTORS: DESCRIPTORS: ACHING

## 2025-01-29 ASSESSMENT — PAIN - FUNCTIONAL ASSESSMENT: PAIN_FUNCTIONAL_ASSESSMENT: 0-10

## 2025-01-29 ASSESSMENT — PAIN DESCRIPTION - LOCATION: LOCATION: KNEE

## 2025-01-29 ASSESSMENT — PAIN SCALES - GENERAL: PAINLEVEL_OUTOF10: 10

## 2025-01-29 ASSESSMENT — PAIN DESCRIPTION - ORIENTATION: ORIENTATION: RIGHT

## 2025-01-29 NOTE — DISCHARGE INSTRUCTIONS
Resources for Emergency Housing Assistance      [employer]    [facility] (emergency housing for families & single women)  [address] [place] [address]  Phone: [phone]  Fax: [phone]    [facility] (emergency housing for single men)  [address] [place] [address]   ?Phone: [phone] ?  Fax: [phone]    [facility] (permanent supportive housing)  [address] [place] [address] ?  Phone: [phone] ?  Fax: [phone]      HOW TO GET HELP:    Step 1:  Grab your Photo ID.?We need to be sure who you are.    Step 2:  To speed up the process, get a police check downtown.    Step 3:  Call [facility]s Intake Office Hours?M-F, 9:00 am to 3:30 pm,?[phone].        [facility]    Walk-in Assistance  Location: [address] [place] [address]  Hours: Tuesday-Friday 10:30 AM - 12:00 PM  Assistance with lodging, prescriptions, ID’s, and work clothing    Emergency Assistance  Phone: [phone]  Calls accepted between 10:00 - 10:30 AM Tuesdays and Fridays.  Assistance with rent, utilities and furniture.        [facility]  [address] [place] [address]  [phone]    Hours:  Mon. 2:30pm - 5:00pm     Services:  Provides emergency food and limited assistance with financial needs including rent,  utilities, and prescriptions  Serves [facility]  [address] [place] [address]  [email or web address]  [phone]    Services:  Provides food assistance to [facility] residents in need  May also provide hygiene items  May offer assistance with emergency rent   *must have qualifying reason  May offer assistance with utilities (gas/electric) payments   *must have disconnection notice    Hours:  Friday 9:00am - 4:00pm by appointment only   * call during the week for

## 2025-01-30 NOTE — ED PROVIDER NOTES
[facility]    Emergency Department Encounter      Patient: Mario Orellana  MRN: [id number]  : 1990  Date of Evaluation: 2025  ED Provider:  Chelita Sharma DO    Triage Chief Complaint:   Knee Pain (Right knee cramping )    [place]:  Mario Orellana is a 34 y.o. male who  has a past medical history of Bipolar 1 disorder (HCC), Chronic generalized abdominal pain, Depression, GERD (gastroesophageal reflux disease), IBS (irritable bowel syndrome), Mental impairment, OCD (obsessive compulsive disorder), and Schizo affective schizophrenia (HCC). and presents with   Requesting a bentyl and toradol shot and colace for recurrent abdominal pain.  He said he has not picked up his prior prescriptions for colace and asked for a refill.  He also has \"knee\" pointing at his posterior distal thigh pain after slipping on the ice that has overall gotten better.  He was able to walk to the hospital today.    ROS - see HPI, below listed is current ROS at time of my eval:   systems reviewed and negative except as above.     Past Medical History:   Diagnosis Date    Bipolar 1 disorder (HCC)     Chronic generalized abdominal pain     Depression     GERD (gastroesophageal reflux disease)     IBS (irritable bowel syndrome)     Mental impairment     OCD (obsessive compulsive disorder)     Schizo affective schizophrenia (HCC)      Past Surgical History:   Procedure Laterality Date    COLONOSCOPY      does not know    EGD      does not know     Family History   Problem Relation Age of Onset    Colon Cancer Neg Hx     Esophageal Cancer Neg Hx     Liver Cancer Neg Hx     Rectal Cancer Neg Hx     Stomach Cancer Neg Hx      Social History     Socioeconomic History    Marital status: Unknown     Spouse name: Not on file    Number of children: Not on file    Years of education: Not on file    Highest education level: Not on file   Occupational History    Not on file   Tobacco Use    Smoking status: Never    Smokeless  tobacco: Never   Vaping Use    Vaping status: Never Used   Substance and Sexual Activity    Alcohol use: Never    Drug use: Never    Sexual activity: Not on file   Other Topics Concern    Not on file   Social History Narrative    Not on file     Social Determinants of Health     Financial Resource Strain: Low Risk  (7/24/2020)    Received from [facility]    Overall Financial Resource Strain (CARDIA)     Difficulty of Paying Living Expenses: Not hard at all   Food Insecurity: Unknown (1/23/2024)    Received from [facility]    Food Insecurities     Worried about running out of food: Not on file     Food Bought: Not on file   Transportation Needs: Unknown (1/23/2024)    Received from [facility]    Transportation     Worried about transportation: Not on file   Physical Activity: Sufficiently Active (7/24/2020)    Received from [facility]    Exercise Vital Sign     Days of Exercise per Week: 5 days     Minutes of Exercise per Session: 30 min   Stress: Stress Concern Present (7/24/2020)    Received from [facility]    [place] of Occupational Health - Occupational Stress Questionnaire     Feeling of Stress : To some extent   Social Connections: Socially Isolated (7/24/2020)    Received from [facility]    Social Connection and Isolation Panel [NHANES]     Frequency of Communication with Friends and Family: Once a week     Frequency of Social Gatherings with Friends and Family: Never     Attends Scientologist Services: Never     Active Member of Clubs or Organizations: Yes     Attends Club or Organization Meetings: 1 to 4 times per year     Marital Status: Never    Intimate Partner Violence: Not At Risk (6/1/2024)    Received from [facility]    Humiliation, Afraid, Rape, and Kick questionnaire     Fear of Current or Ex-Partner: No     Emotionally Abused:

## 2025-01-30 NOTE — ED PROVIDER NOTES
EMERGENCY DEPARTMENT ENCOUNTER        Pt Name: Mario Orellana  MRN: [id number]  Birthdate 1990  Date of evaluation: 1/29/2025  Provider: Marilee Rees PA-C  PCP: No primary care provider on file.    TALA. I have evaluated this patient.        Triage CHIEF COMPLAINT       Chief Complaint   Patient presents with    Abdominal Pain     Seen almost daily for same pain, wants bentyl shot          HISTORY OF PRESENT ILLNESS      Chief Complaint: Abdominal pain    Mario Orellana is a 34 y.o. male who presents for abdominal pain.  He states this began today.  He believes that he is constipated because he \"eats and eats and eats and gets full of poop.\"  Pain is diffuse, cramping.  He says his last bowel movement was yesterday.  Denies n/v.       Requesting a Starry soda.      Nursing Notes were all reviewed and agreed with or any disagreements were addressed in the HPI.    REVIEW OF SYSTEMS     CONSTITUTIONAL:  Denies fever.  EYES:  Denies visual changes.  HEAD:  Denies headache.  ENT:  Denies earache, nasal congestion, sore throat.  NECK:  Denies neck pain.  RESPIRATORY:  Denies any shortness of breath.  CARDIOVASCULAR:  Denies chest pain.  GI:  Denies nausea or vomiting.  + abd pain.  :  Denies urinary symptoms.  MUSCULOSKELETAL:  Denies extremity pain or swelling.  BACK:  Denies back pain.  INTEGUMENT:  Denies skin changes.  LYMPHATIC:  Denies lymphadenopathy.  NEUROLOGIC:  Denies any numbness/tingling.  PSYCHIATRIC:  Denies SI/HI.    PAST MEDICAL HISTORY     Past Medical History:   Diagnosis Date    Bipolar 1 disorder (HCC)     Chronic generalized abdominal pain     Depression     GERD (gastroesophageal reflux disease)     IBS (irritable bowel syndrome)     Mental impairment     OCD (obsessive compulsive disorder)     Schizo affective schizophrenia (HCC)        SURGICAL HISTORY     Past Surgical History:   Procedure Laterality Date    COLONOSCOPY      does not know    EGD      does not know  per Session: 30 min   Stress: Stress Concern Present (7/24/2020)    Received from [facility]    [place] of Occupational Health - Occupational Stress Questionnaire     Feeling of Stress : To some extent   Social Connections: Socially Isolated (7/24/2020)    Received from [facility]    Social Connection and Isolation Panel [NHANES]     Frequency of Communication with Friends and Family: Once a week     Frequency of Social Gatherings with Friends and Family: Never     Attends Episcopalian Services: Never     Active Member of Clubs or Organizations: Yes     Attends Club or Organization Meetings: 1 to 4 times per year     Marital Status: Never    Intimate Partner Violence: Not At Risk (6/1/2024)    Received from [facility]    Humiliation, Afraid, Rape, and Kick questionnaire     Fear of Current or Ex-Partner: No     Emotionally Abused: No     Physically Abused: No     Sexually Abused: No    [email or web address]    Housing/Utilities       SCREENINGS    [place] Coma Scale  Eye Opening: Spontaneous  Best Verbal Response: Oriented  Best Motor Response: Obeys commands  [place] Coma Scale Score: 15      PHYSICAL EXAM       ED Triage Vitals [01/29/25 2215]   BP Systolic BP Percentile Diastolic BP Percentile Temp Temp Source Pulse Respirations SpO2   120/78 -- -- 98.4 °F (36.9 °C) Oral (!) 109 18 94 %      Height Weight         -- --            GENERAL APPEARANCE:  Well-developed, well-nourished, no acute distress.  HEAD:  NC/AT.  EYES:  Sclera anicteric.   ENT:  Ears, nose, mouth normal.     NECK:  Supple.  CARDIO:  RRR.  LUNGS:   CTAB.  Respirations unlabored.  ABDOMEN:  Soft, non-distended, non-tender.  BS active.  SKIN:  Warm and dry.   NEUROLOGICAL:  Alert and oriented.  PSYCHIATRIC:  Normal mood.     DIAGNOSTIC RESULTS   LABS:    Labs Reviewed - No data to display    When ordered, only abnormal lab results are displayed.

## 2025-01-30 NOTE — CARE COORDINATION
RITA review of pt chart who has had excessive visits to this ER. Pt has been banned from the shelters in [place]. 1/14/25 pt was provided a ride to [facility] who would have assisted with possible permanent housing. Pt has returned to [place] per his own resources.     Pt will need to call his insurance to secure transport. CM has run out of options for pt who has chosen to leave the [facility] to return to [place] where he has no place in community for shelter.     Pt will need to secure his own housing and ride. YESSYRN/RITA

## 2025-02-05 ENCOUNTER — HOSPITAL ENCOUNTER (EMERGENCY)
Age: 35
Discharge: HOME | End: 2025-02-05
Attending: EMERGENCY MEDICINE
Payer: MEDICARE

## 2025-02-05 VITALS
TEMPERATURE: 98.2 F | RESPIRATION RATE: 16 BRPM | OXYGEN SATURATION: 99 % | SYSTOLIC BLOOD PRESSURE: 141 MMHG | DIASTOLIC BLOOD PRESSURE: 82 MMHG

## 2025-02-05 DIAGNOSIS — G89.29 CHRONIC ABDOMINAL PAIN: Primary | ICD-10-CM

## 2025-02-05 DIAGNOSIS — R10.9 CHRONIC ABDOMINAL PAIN: Primary | ICD-10-CM

## 2025-02-05 PROCEDURE — 99284 EMERGENCY DEPT VISIT MOD MDM: CPT

## 2025-02-05 PROCEDURE — 6360000002 HC RX W HCPCS: Performed by: EMERGENCY MEDICINE

## 2025-02-05 PROCEDURE — 6370000000 HC RX 637 (ALT 250 FOR IP): Performed by: EMERGENCY MEDICINE

## 2025-02-05 PROCEDURE — 96372 THER/PROPH/DIAG INJ SC/IM: CPT

## 2025-02-05 RX ORDER — DICYCLOMINE HYDROCHLORIDE 10 MG/ML
20 INJECTION INTRAMUSCULAR ONCE
Status: COMPLETED | OUTPATIENT
Start: 2025-02-05 | End: 2025-02-05

## 2025-02-05 RX ORDER — DOCUSATE SODIUM 100 MG/1
100 CAPSULE, LIQUID FILLED ORAL 2 TIMES DAILY
Qty: 40 CAPSULE | Refills: 0 | Status: SHIPPED | OUTPATIENT
Start: 2025-02-05

## 2025-02-05 RX ORDER — ONDANSETRON 4 MG/1
4 TABLET, ORALLY DISINTEGRATING ORAL 3 TIMES DAILY PRN
Qty: 21 TABLET | Refills: 0 | Status: SHIPPED | OUTPATIENT
Start: 2025-02-05

## 2025-02-05 RX ORDER — ONDANSETRON 4 MG/1
4 TABLET, ORALLY DISINTEGRATING ORAL ONCE
Status: COMPLETED | OUTPATIENT
Start: 2025-02-05 | End: 2025-02-05

## 2025-02-05 RX ADMIN — ONDANSETRON 4 MG: 4 TABLET, ORALLY DISINTEGRATING ORAL at 14:01

## 2025-02-05 RX ADMIN — DICYCLOMINE HYDROCHLORIDE 20 MG: 20 INJECTION, SOLUTION INTRAMUSCULAR at 14:01

## 2025-02-05 ASSESSMENT — PAIN SCALES - GENERAL: PAINLEVEL_OUTOF10: 10

## 2025-02-05 ASSESSMENT — PAIN DESCRIPTION - LOCATION: LOCATION: ABDOMEN

## 2025-02-05 ASSESSMENT — PAIN DESCRIPTION - ORIENTATION: ORIENTATION: RIGHT

## 2025-02-05 ASSESSMENT — PAIN DESCRIPTION - DESCRIPTORS: DESCRIPTORS: ACHING

## 2025-02-05 NOTE — ED PROVIDER NOTES
Triage Chief Complaint:   Abdominal Cramping    [place]:  Mario Orellana is a 34 y.o. male that presents with chronic abdominal pain.  Patient reports longstanding issues with abdominal pain described as cramping.  Patient reports \"when I get stressed I cramp\".  Patient reports pain is mid abdomen, currently moderate in intensity but does wax and wane.  Patient reports some constipation and some nausea without vomiting.  Symptoms do improve when he gets a shot of Bentyl which she is requesting at this time.    Patient does have underlying bipolar disorder and schizoaffective disorder and mental impairment limiting history some.    ROS:  Limited as above    Past Medical History:   Diagnosis Date    Bipolar 1 disorder (HCC)     Chronic generalized abdominal pain     Depression     GERD (gastroesophageal reflux disease)     IBS (irritable bowel syndrome)     Mental impairment     OCD (obsessive compulsive disorder)     Schizo affective schizophrenia (HCC)      Past Surgical History:   Procedure Laterality Date    COLONOSCOPY      does not know    EGD      does not know     Family History   Problem Relation Age of Onset    Colon Cancer Neg Hx     Esophageal Cancer Neg Hx     Liver Cancer Neg Hx     Rectal Cancer Neg Hx     Stomach Cancer Neg Hx      Social History     Socioeconomic History    Marital status: Unknown     Spouse name: Not on file    Number of children: Not on file    Years of education: Not on file    Highest education level: Not on file   Occupational History    Not on file   Tobacco Use    Smoking status: Never    Smokeless tobacco: Never   Vaping Use    Vaping status: Never Used   Substance and Sexual Activity    Alcohol use: Never    Drug use: Never    Sexual activity: Not on file   Other Topics Concern    Not on file   Social History Narrative    Not on file     Social Determinants of Health     Financial Resource Strain: Low Risk  (7/24/2020)    Received from [facility], [facility]

## 2025-02-06 ENCOUNTER — HOSPITAL ENCOUNTER (EMERGENCY)
Age: 35
Discharge: HOME | End: 2025-02-06
Payer: MEDICARE

## 2025-02-06 VITALS
WEIGHT: 147 LBS | TEMPERATURE: 97.2 F | SYSTOLIC BLOOD PRESSURE: 132 MMHG | OXYGEN SATURATION: 96 % | HEART RATE: 98 BPM | RESPIRATION RATE: 17 BRPM | BODY MASS INDEX: 26.04 KG/M2 | DIASTOLIC BLOOD PRESSURE: 72 MMHG

## 2025-02-06 DIAGNOSIS — R10.9 CHRONIC ABDOMINAL PAIN: Primary | ICD-10-CM

## 2025-02-06 DIAGNOSIS — G89.29 CHRONIC ABDOMINAL PAIN: Primary | ICD-10-CM

## 2025-02-06 LAB
ALBUMIN SERPL-MCNC: 4.6 G/DL (ref 3.4–5)
ALBUMIN/GLOB SERPL: 1.6 {RATIO} (ref 1.1–2.2)
ALP SERPL-CCNC: 72 U/L (ref 40–129)
ALT SERPL-CCNC: 7 U/L (ref 10–40)
ANION GAP SERPL CALCULATED.3IONS-SCNC: 11 MMOL/L (ref 9–17)
AST SERPL-CCNC: 19 U/L (ref 15–37)
BASOPHILS # BLD: 0.02 K/UL
BASOPHILS NFR BLD: 0 % (ref 0–1)
BILIRUB SERPL-MCNC: 0.3 MG/DL (ref 0–1)
BUN SERPL-MCNC: 11 MG/DL (ref 7–20)
CALCIUM SERPL-MCNC: 9.4 MG/DL (ref 8.3–10.6)
CHLORIDE SERPL-SCNC: 101 MMOL/L (ref 99–110)
CO2 SERPL-SCNC: 29 MMOL/L (ref 21–32)
CREAT SERPL-MCNC: 1 MG/DL (ref 0.9–1.3)
EOSINOPHIL # BLD: 0.21 K/UL
EOSINOPHILS RELATIVE PERCENT: 3 % (ref 0–3)
ERYTHROCYTE [DISTWIDTH] IN BLOOD BY AUTOMATED COUNT: 12 % (ref 11.7–14.9)
GFR, ESTIMATED: 85 ML/MIN/1.73M2
GLUCOSE SERPL-MCNC: 94 MG/DL (ref 74–99)
HCT VFR BLD AUTO: 43.8 % (ref 42–52)
HGB BLD-MCNC: 14.5 G/DL (ref 13.5–18)
IMM GRANULOCYTES # BLD AUTO: 0.01 K/UL
IMM GRANULOCYTES NFR BLD: 0 %
LIPASE SERPL-CCNC: 50 U/L (ref 13–60)
LYMPHOCYTES NFR BLD: 2.19 K/UL
LYMPHOCYTES RELATIVE PERCENT: 34 % (ref 24–44)
MCH RBC QN AUTO: 30.5 PG (ref 27–31)
MCHC RBC AUTO-ENTMCNC: 33.1 G/DL (ref 32–36)
MCV RBC AUTO: 92.2 FL (ref 78–100)
MONOCYTES NFR BLD: 0.52 K/UL
MONOCYTES NFR BLD: 8 % (ref 0–4)
NEUTROPHILS NFR BLD: 54 % (ref 36–66)
NEUTS SEG NFR BLD: 3.47 K/UL
PLATELET # BLD AUTO: 210 K/UL (ref 140–440)
PMV BLD AUTO: 10 FL (ref 7.5–11.1)
POTASSIUM SERPL-SCNC: 4 MMOL/L (ref 3.5–5.1)
PROT SERPL-MCNC: 7.3 G/DL (ref 6.4–8.2)
RBC # BLD AUTO: 4.75 M/UL (ref 4.6–6.2)
SODIUM SERPL-SCNC: 141 MMOL/L (ref 136–145)
WBC OTHER # BLD: 6.4 K/UL (ref 4–10.5)

## 2025-02-06 PROCEDURE — 83690 ASSAY OF LIPASE: CPT

## 2025-02-06 PROCEDURE — 99284 EMERGENCY DEPT VISIT MOD MDM: CPT

## 2025-02-06 PROCEDURE — 85025 COMPLETE CBC W/AUTO DIFF WBC: CPT

## 2025-02-06 PROCEDURE — 6360000002 HC RX W HCPCS: Performed by: PHYSICIAN ASSISTANT

## 2025-02-06 PROCEDURE — 80053 COMPREHEN METABOLIC PANEL: CPT

## 2025-02-06 PROCEDURE — 96374 THER/PROPH/DIAG INJ IV PUSH: CPT

## 2025-02-06 PROCEDURE — 2580000003 HC RX 258: Performed by: PHYSICIAN ASSISTANT

## 2025-02-06 RX ORDER — KETOROLAC TROMETHAMINE 15 MG/ML
15 INJECTION, SOLUTION INTRAMUSCULAR; INTRAVENOUS ONCE
Status: COMPLETED | OUTPATIENT
Start: 2025-02-06 | End: 2025-02-06

## 2025-02-06 RX ORDER — 0.9 % SODIUM CHLORIDE 0.9 %
250 INTRAVENOUS SOLUTION INTRAVENOUS ONCE
Status: COMPLETED | OUTPATIENT
Start: 2025-02-06 | End: 2025-02-06

## 2025-02-06 RX ADMIN — KETOROLAC TROMETHAMINE 15 MG: 15 INJECTION, SOLUTION INTRAMUSCULAR; INTRAVENOUS at 20:24

## 2025-02-06 RX ADMIN — SODIUM CHLORIDE 250 ML: 9 INJECTION, SOLUTION INTRAVENOUS at 20:24

## 2025-02-06 ASSESSMENT — PAIN SCALES - GENERAL
PAINLEVEL_OUTOF10: 0
PAINLEVEL_OUTOF10: 10

## 2025-02-06 ASSESSMENT — PAIN - FUNCTIONAL ASSESSMENT: PAIN_FUNCTIONAL_ASSESSMENT: 0-10

## 2025-02-07 NOTE — ED PROVIDER NOTES
Triage Chief Complaint:   Abdominal Pain (Pt states, \"I was here yesterday and they only gave me one shot instead of 2\" )    [place]:  Today in the ED I had the pleasure of caring for Mario Orellana who is a 34 y.o. male that presents today to the ED for evlaution for abdomian pain. Pt has chronic benign abdominal pian. With GERD. Seen here frequently for this. He was seen yesaterday and got a bentyl shot. States he is still having diffuse abdominal ache and request toradol.     Denies nausea, voming.  Denies chest pain back pain flank pain.  Denies urinary symptoms.  Pain strength 2/10..    ROS:  REVIEW OF SYSTEMS    At least 10 systems reviewed      All other review of systems are negative  See HPI and nursing notes for additional information       Past Medical History:   Diagnosis Date    Bipolar 1 disorder (HCC)     Chronic generalized abdominal pain     Depression     GERD (gastroesophageal reflux disease)     IBS (irritable bowel syndrome)     Mental impairment     OCD (obsessive compulsive disorder)     Schizo affective schizophrenia (HCC)      Past Surgical History:   Procedure Laterality Date    COLONOSCOPY      does not know    EGD      does not know     Family History   Problem Relation Age of Onset    Colon Cancer Neg Hx     Esophageal Cancer Neg Hx     Liver Cancer Neg Hx     Rectal Cancer Neg Hx     Stomach Cancer Neg Hx      Social History     Socioeconomic History    Marital status: Unknown     Spouse name: Not on file    Number of children: Not on file    Years of education: Not on file    Highest education level: Not on file   Occupational History    Not on file   Tobacco Use    Smoking status: Never    Smokeless tobacco: Never   Vaping Use    Vaping status: Never Used   Substance and Sexual Activity    Alcohol use: Never    Drug use: Never    Sexual activity: Not on file   Other Topics Concern    Not on file   Social History Narrative    Not on file     Social Determinants of Health     Financial

## 2025-02-11 ENCOUNTER — HOSPITAL ENCOUNTER (EMERGENCY)
Age: 35
Discharge: HOME | End: 2025-02-11
Payer: MEDICARE

## 2025-02-11 VITALS
OXYGEN SATURATION: 98 % | DIASTOLIC BLOOD PRESSURE: 90 MMHG | HEART RATE: 100 BPM | RESPIRATION RATE: 17 BRPM | SYSTOLIC BLOOD PRESSURE: 139 MMHG | TEMPERATURE: 97.9 F

## 2025-02-11 DIAGNOSIS — R10.9 CHRONIC ABDOMINAL PAIN: Primary | ICD-10-CM

## 2025-02-11 DIAGNOSIS — G89.29 CHRONIC ABDOMINAL PAIN: Primary | ICD-10-CM

## 2025-02-11 PROCEDURE — 6360000002 HC RX W HCPCS: Performed by: PHYSICIAN ASSISTANT

## 2025-02-11 PROCEDURE — 96372 THER/PROPH/DIAG INJ SC/IM: CPT

## 2025-02-11 PROCEDURE — 99284 EMERGENCY DEPT VISIT MOD MDM: CPT

## 2025-02-11 RX ORDER — KETOROLAC TROMETHAMINE 15 MG/ML
30 INJECTION, SOLUTION INTRAMUSCULAR; INTRAVENOUS ONCE
Status: COMPLETED | OUTPATIENT
Start: 2025-02-11 | End: 2025-02-11

## 2025-02-11 RX ORDER — DICYCLOMINE HYDROCHLORIDE 10 MG/ML
20 INJECTION INTRAMUSCULAR ONCE
Status: COMPLETED | OUTPATIENT
Start: 2025-02-11 | End: 2025-02-11

## 2025-02-11 RX ADMIN — KETOROLAC TROMETHAMINE 30 MG: 15 INJECTION, SOLUTION INTRAMUSCULAR; INTRAVENOUS at 11:12

## 2025-02-11 RX ADMIN — DICYCLOMINE HYDROCHLORIDE 20 MG: 10 INJECTION, SOLUTION INTRAMUSCULAR at 11:12

## 2025-02-11 ASSESSMENT — PAIN - FUNCTIONAL ASSESSMENT: PAIN_FUNCTIONAL_ASSESSMENT: 0-10

## 2025-02-11 ASSESSMENT — PAIN DESCRIPTION - LOCATION: LOCATION: ABDOMEN

## 2025-02-11 ASSESSMENT — PAIN DESCRIPTION - DESCRIPTORS: DESCRIPTORS: CRAMPING

## 2025-02-11 ASSESSMENT — PAIN SCALES - GENERAL: PAINLEVEL_OUTOF10: 9

## 2025-02-11 NOTE — DISCHARGE INSTRUCTIONS
Please go to [employer] or call [phone] to find a new provider.     Or use QR code below:

## 2025-02-11 NOTE — ED PROVIDER NOTES
mass.      Tenderness: There is no abdominal tenderness. There is no guarding or rebound.      Hernia: No hernia is present.      Comments: Nontender, nonsurgical exam   Musculoskeletal:         General: Normal range of motion.      Cervical back: Normal range of motion.   Skin:     General: Skin is warm and dry.   Neurological:      Mental Status: He is alert and oriented to person, place, and time.      Gait: Gait normal.   Psychiatric:         Mood and Affect: Mood normal.         Behavior: Behavior normal.             DIAGNOSTIC RESULTS   LABS:    Labs Reviewed - No data to display    When ordered only abnormal lab results are displayed. All other labs were within normal range or not returned as of this dictation.    EKG: When ordered, EKG's are interpreted by the Emergency Department Physician in the absence of a cardiologist.  Please see their note for interpretation of EKG.    RADIOLOGY:   Non-plain film images such as CT, Ultrasound and MRI are read by the radiologist. Plain radiographic images are visualized and preliminarily interpreted by the ED Provider with the below findings:        Interpretation per the Radiologist below, if available at the time of this note:    No orders to display     No results found.    No results found.    PROCEDURES   Unless otherwise noted below, none     Procedures    CRITICAL CARE TIME (.cctime)         EMERGENCY DEPARTMENT COURSE and DIFFERENTIAL DIAGNOSIS/MDM:   Vitals:    Vitals:    02/11/25 1049   BP: (!) 139/90   Pulse: 100   Resp: 17   Temp: 97.9 °F (36.6 °C)   TempSrc: Oral   SpO2: 98%       Patient was given the following medications:  Medications   dicyclomine (BENTYL) injection 20 mg (20 mg IntraMUSCular Given 2/11/25 1112)   ketorolac (TORADOL) injection 30 mg (30 mg IntraMUSCular Given 2/11/25 1112)             Is this patient to be included in the SEP-1 Core Measure due to severe sepsis or septic shock?   No   Exclusion criteria - the patient is NOT to be

## 2025-02-13 ENCOUNTER — APPOINTMENT (OUTPATIENT)
Dept: GENERAL RADIOLOGY | Age: 35
End: 2025-02-13
Payer: MEDICARE

## 2025-02-13 ENCOUNTER — HOSPITAL ENCOUNTER (EMERGENCY)
Age: 35
Discharge: HOME | End: 2025-02-13
Attending: EMERGENCY MEDICINE
Payer: MEDICARE

## 2025-02-13 VITALS
SYSTOLIC BLOOD PRESSURE: 147 MMHG | DIASTOLIC BLOOD PRESSURE: 98 MMHG | TEMPERATURE: 97.6 F | RESPIRATION RATE: 16 BRPM | HEART RATE: 95 BPM | OXYGEN SATURATION: 95 %

## 2025-02-13 DIAGNOSIS — R10.9 CHRONIC ABDOMINAL PAIN: Primary | ICD-10-CM

## 2025-02-13 DIAGNOSIS — G89.29 CHRONIC ABDOMINAL PAIN: Primary | ICD-10-CM

## 2025-02-13 LAB
BILIRUB UR QL STRIP: NEGATIVE
CLARITY UR: CLEAR
COLOR UR: YELLOW
COMMENT: NORMAL
GLUCOSE UR STRIP-MCNC: NEGATIVE MG/DL
HGB UR QL STRIP.AUTO: NEGATIVE
KETONES UR STRIP-MCNC: NEGATIVE MG/DL
LEUKOCYTE ESTERASE UR QL STRIP: NEGATIVE
NITRITE UR QL STRIP: NEGATIVE
PH UR STRIP: 8 [PH] (ref 5–8)
PROT UR STRIP-MCNC: NEGATIVE MG/DL
SP GR UR STRIP: 1.01 (ref 1–1.03)
UROBILINOGEN UR STRIP-ACNC: 0.2 EU/DL (ref 0–1)

## 2025-02-13 PROCEDURE — 6360000002 HC RX W HCPCS: Performed by: EMERGENCY MEDICINE

## 2025-02-13 PROCEDURE — 81003 URINALYSIS AUTO W/O SCOPE: CPT

## 2025-02-13 PROCEDURE — 74019 RADEX ABDOMEN 2 VIEWS: CPT

## 2025-02-13 PROCEDURE — 99284 EMERGENCY DEPT VISIT MOD MDM: CPT

## 2025-02-13 PROCEDURE — 96372 THER/PROPH/DIAG INJ SC/IM: CPT

## 2025-02-13 RX ORDER — FAMOTIDINE 20 MG/1
20 TABLET, FILM COATED ORAL 2 TIMES DAILY
Qty: 60 TABLET | Refills: 0 | Status: SHIPPED | OUTPATIENT
Start: 2025-02-13

## 2025-02-13 RX ORDER — DICYCLOMINE HYDROCHLORIDE 10 MG/ML
20 INJECTION INTRAMUSCULAR ONCE
Status: COMPLETED | OUTPATIENT
Start: 2025-02-13 | End: 2025-02-13

## 2025-02-13 RX ORDER — DICYCLOMINE HCL 20 MG
20 TABLET ORAL 4 TIMES DAILY
Qty: 30 TABLET | Refills: 0 | Status: SHIPPED | OUTPATIENT
Start: 2025-02-13

## 2025-02-13 RX ADMIN — DICYCLOMINE HYDROCHLORIDE 20 MG: 10 INJECTION, SOLUTION INTRAMUSCULAR at 11:51

## 2025-02-13 NOTE — ED PROVIDER NOTES
Emergency Department Encounter    Patient: Mario Orellana  MRN: [id number]  : 1990  Date of Evaluation: 2025  ED Provider:  Amy Lal DO    Triage Chief Complaint:   Abdominal Pain    [place]:  Mario Orellana is a 34 y.o. male with history of OCD irritable about bipolar disorder acid reflux mental impairment depression chronic abdominal pain schizoaffective disorder that presents to the emergency department complaining of abdominal pain.  Patient states he did have a bowel movement this morning that was hard.  Patient states no abdominal pain has been cramping.  Patient states he has not taken any medication.  Patient [place] any fever chills cough sore throat runny nose earache no dysuria or hematuria.  Patient states nausea no vomiting or diarrhea.  Patient states he does not have any new prescriptions medication.  Patient here for evaluation.    ROS - see HPI, below listed is current ROS at time of my eval:  10 systems reviewed and negative except as above.     Past Medical History:   Diagnosis Date    Bipolar 1 disorder (HCC)     Chronic generalized abdominal pain     Depression     GERD (gastroesophageal reflux disease)     IBS (irritable bowel syndrome)     Mental impairment     OCD (obsessive compulsive disorder)     Schizo affective schizophrenia (HCC)      Past Surgical History:   Procedure Laterality Date    COLONOSCOPY      does not know    EGD      does not know     Family History   Problem Relation Age of Onset    Colon Cancer Neg Hx     Esophageal Cancer Neg Hx     Liver Cancer Neg Hx     Rectal Cancer Neg Hx     Stomach Cancer Neg Hx      Social History     Socioeconomic History    Marital status: Unknown     Spouse name: Not on file    Number of children: Not on file    Years of education: Not on file    Highest education level: Not on file   Occupational History    Not on file   Tobacco Use    Smoking status: Never    Smokeless tobacco: Never   Vaping Use    Vaping status: Never

## 2025-02-13 NOTE — DISCHARGE INSTRUCTIONS
Follow-up with gastroenterologist Dr. Astudillo for evaluation of chronic abdominal pain.  Call for an appointment  Take Bentyl as prescribed for abdominal cramps and spasm  Take Pepcid as prescribed for acid reflux ulcer gastritis  Take over-the-counter Tylenol as needed for pain  Return to the emergency department immediately pain fever chills nausea vomiting dizzy lightheaded 20 worsening symptoms.

## 2025-02-28 ENCOUNTER — APPOINTMENT (OUTPATIENT)
Dept: GENERAL RADIOLOGY | Age: 35
End: 2025-02-28
Payer: MEDICARE

## 2025-02-28 ENCOUNTER — HOSPITAL ENCOUNTER (EMERGENCY)
Age: 35
Discharge: HOME | End: 2025-02-28
Payer: MEDICARE

## 2025-02-28 VITALS
SYSTOLIC BLOOD PRESSURE: 127 MMHG | RESPIRATION RATE: 18 BRPM | TEMPERATURE: 98.1 F | HEART RATE: 94 BPM | OXYGEN SATURATION: 97 % | DIASTOLIC BLOOD PRESSURE: 81 MMHG

## 2025-02-28 DIAGNOSIS — R10.84 GENERALIZED ABDOMINAL PAIN: Primary | ICD-10-CM

## 2025-02-28 PROCEDURE — 6370000000 HC RX 637 (ALT 250 FOR IP)

## 2025-02-28 PROCEDURE — 6360000002 HC RX W HCPCS

## 2025-02-28 PROCEDURE — 74018 RADEX ABDOMEN 1 VIEW: CPT

## 2025-02-28 PROCEDURE — 96372 THER/PROPH/DIAG INJ SC/IM: CPT

## 2025-02-28 PROCEDURE — 99284 EMERGENCY DEPT VISIT MOD MDM: CPT

## 2025-02-28 RX ORDER — DOCUSATE SODIUM 100 MG/1
100 CAPSULE, LIQUID FILLED ORAL DAILY
Status: DISCONTINUED | OUTPATIENT
Start: 2025-02-28 | End: 2025-02-28 | Stop reason: HOSPADM

## 2025-02-28 RX ORDER — KETOROLAC TROMETHAMINE 15 MG/ML
30 INJECTION, SOLUTION INTRAMUSCULAR; INTRAVENOUS ONCE
Status: COMPLETED | OUTPATIENT
Start: 2025-02-28 | End: 2025-02-28

## 2025-02-28 RX ORDER — DICYCLOMINE HYDROCHLORIDE 10 MG/ML
20 INJECTION INTRAMUSCULAR ONCE
Status: COMPLETED | OUTPATIENT
Start: 2025-02-28 | End: 2025-02-28

## 2025-02-28 RX ORDER — BENZOCAINE/MENTHOL 6 MG-10 MG
LOZENGE MUCOUS MEMBRANE
Qty: 28 G | Refills: 0 | Status: SHIPPED | OUTPATIENT
Start: 2025-02-28

## 2025-02-28 RX ORDER — DOCUSATE SODIUM 100 MG/1
100 CAPSULE, LIQUID FILLED ORAL 2 TIMES DAILY
Qty: 40 CAPSULE | Refills: 0 | Status: SHIPPED | OUTPATIENT
Start: 2025-02-28

## 2025-02-28 RX ADMIN — DICYCLOMINE HYDROCHLORIDE 20 MG: 10 INJECTION, SOLUTION INTRAMUSCULAR at 12:33

## 2025-02-28 RX ADMIN — KETOROLAC TROMETHAMINE 30 MG: 15 INJECTION, SOLUTION INTRAMUSCULAR; INTRAVENOUS at 12:32

## 2025-02-28 RX ADMIN — DOCUSATE SODIUM 100 MG: 100 CAPSULE, LIQUID FILLED ORAL at 12:32

## 2025-02-28 ASSESSMENT — PAIN - FUNCTIONAL ASSESSMENT: PAIN_FUNCTIONAL_ASSESSMENT: 0-10

## 2025-02-28 ASSESSMENT — PAIN SCALES - GENERAL: PAINLEVEL_OUTOF10: 0

## 2025-02-28 NOTE — DISCHARGE INSTRUCTIONS
Adult Mental Health Outpatient Resources    -[facility]    [address], [place]    [phone]    Walk-in-hours M-F 8-10 am  - [facility]  [phone]  --[facility]               [address], [place] [phone]              [address] [place], [place] [phone]              [address] [place]        [phone]   -[facility]               [address], [place]    [phone]  - [facility]                [address], [place]    [phone]  -[facility]          [address], [place] [phone]  -[facility]   [address] [place], [place] [phone]   [address] [place], [place] [phone]  -[facility]   [address] [place], [place], [address] [phone]  [facility]   [address] [place], [phone]  ___________________________________________________________________________________  [address] [address]  [email or web address]    [facility]  [phone]   [facility]  Service description:ALL INTAKES [phone] [facility] offers emergency shelter for families and single women and [facility] shelters single men from the [facility] area. Case management and specific assistance services are offered.   Intake procedure:Must be able to provide picture I.D., Birth Certificate, proof of custody, and social security numbers and police/ criminal history check. Background check from the Police Department.  Fees:None  Eligibility:Due to safety concerns, does not accept active domestic violence cases, no current warrants or assault charges within

## 2025-02-28 NOTE — ED PROVIDER NOTES
[facility] EMERGENCY DEPARTMENT  EMERGENCY DEPARTMENT ENCOUNTER        Pt Name: Mario Orellana  MRN: [id number]  Birthdate 1990  Date of evaluation: 2/28/2025  Provider: ROSE Rosas CNP  PCP: No primary care provider on file.  Note Started: 1:45 PM EST 2/28/25      TALA. I have evaluated this patient.        CHIEF COMPLAINT       Chief Complaint   Patient presents with    Abdominal Pain     Reports wanting \"that medicine\" d/t cramping in bottom     Care Management     Reports needing a         HISTORY OF PRESENT ILLNESS: 1 or more Elements     History From: Patient    Limitations to history : None    Social Determinants Significantly Affecting Health : None    Chief Complaint: Abdominal cramping    Mario Orellana is a 34 y.o. male history of chronic abdominal pain who presents to ED stating the stool softener he was using he ran out of due to insurance reasons.  Stated he did use Maalox yesterday and did have multiple bowel movements with some relief of symptoms.  States today he had some abdominal cramping and is requesting the pain shot that helps with his abdominal cramping.  He also states that he wiped too hard yesterday and had some bright red spotting due to his hemorrhoids.  States he does not have any cream for this.  Denies any chest pain current abdominal pain nausea vomiting fever body aches or chills.    Nursing Notes were all reviewed and agreed with or any disagreements were addressed in the HPI.    REVIEW OF SYSTEMS :      Review of Systems    Positives and Pertinent negatives as per HPI.     SURGICAL HISTORY     Past Surgical History:   Procedure Laterality Date    COLONOSCOPY      does not know    EGD      does not know       CURRENTMEDICATIONS       Previous Medications    DICYCLOMINE (BENTYL) 20 MG TABLET    Take 1 tablet by mouth 4 times daily    FAMOTIDINE (PEPCID) 20 MG TABLET    Take 1 tablet by mouth 2 times daily    IBUPROFEN (ADVIL;MOTRIN)

## 2025-02-28 NOTE — CARE COORDINATION
CM review of pt chart for discharge needs for ongoing care once medically evaluated and cleared for discharge. Community resources provided in pt AVS for continued care and recovery in o/p setting. YESSY,RN/CM

## 2025-03-01 ENCOUNTER — APPOINTMENT (OUTPATIENT)
Dept: GENERAL RADIOLOGY | Age: 35
End: 2025-03-01
Payer: MEDICARE

## 2025-03-01 ENCOUNTER — HOSPITAL ENCOUNTER (EMERGENCY)
Age: 35
Discharge: HOME | End: 2025-03-01
Attending: STUDENT IN AN ORGANIZED HEALTH CARE EDUCATION/TRAINING PROGRAM
Payer: MEDICARE

## 2025-03-01 ENCOUNTER — HOSPITAL ENCOUNTER (EMERGENCY)
Age: 35
Discharge: HOME | End: 2025-03-01
Attending: EMERGENCY MEDICINE
Payer: MEDICARE

## 2025-03-01 VITALS
DIASTOLIC BLOOD PRESSURE: 85 MMHG | RESPIRATION RATE: 16 BRPM | OXYGEN SATURATION: 96 % | SYSTOLIC BLOOD PRESSURE: 114 MMHG | HEART RATE: 76 BPM | TEMPERATURE: 97.9 F

## 2025-03-01 VITALS
SYSTOLIC BLOOD PRESSURE: 125 MMHG | OXYGEN SATURATION: 95 % | DIASTOLIC BLOOD PRESSURE: 76 MMHG | TEMPERATURE: 97.6 F | HEART RATE: 100 BPM | RESPIRATION RATE: 20 BRPM

## 2025-03-01 DIAGNOSIS — R00.2 PALPITATIONS: Primary | ICD-10-CM

## 2025-03-01 DIAGNOSIS — R10.9 ABDOMINAL CRAMPING: Primary | ICD-10-CM

## 2025-03-01 LAB
ALBUMIN SERPL-MCNC: 4.4 G/DL (ref 3.4–5)
ALBUMIN/GLOB SERPL: 1.7 {RATIO} (ref 1.1–2.2)
ALP SERPL-CCNC: 76 U/L (ref 40–129)
ALT SERPL-CCNC: 20 U/L (ref 10–40)
ANION GAP SERPL CALCULATED.3IONS-SCNC: 9 MMOL/L (ref 9–17)
AST SERPL-CCNC: 32 U/L (ref 15–37)
BASOPHILS # BLD: 0.02 K/UL
BASOPHILS NFR BLD: 0 % (ref 0–1)
BILIRUB DIRECT SERPL-MCNC: <0.2 MG/DL (ref 0–0.3)
BILIRUB INDIRECT SERPL-MCNC: NORMAL MG/DL (ref 0–0.7)
BILIRUB SERPL-MCNC: <0.2 MG/DL (ref 0–1)
BUN SERPL-MCNC: 16 MG/DL (ref 7–20)
CALCIUM SERPL-MCNC: 9.2 MG/DL (ref 8.3–10.6)
CHLORIDE SERPL-SCNC: 101 MMOL/L (ref 99–110)
CO2 SERPL-SCNC: 31 MMOL/L (ref 21–32)
CREAT SERPL-MCNC: 1 MG/DL (ref 0.9–1.3)
EOSINOPHIL # BLD: 0.32 K/UL
EOSINOPHILS RELATIVE PERCENT: 5 % (ref 0–3)
ERYTHROCYTE [DISTWIDTH] IN BLOOD BY AUTOMATED COUNT: 12.2 % (ref 11.7–14.9)
GFR, ESTIMATED: 88 ML/MIN/1.73M2
GLUCOSE SERPL-MCNC: 96 MG/DL (ref 74–99)
HCT VFR BLD AUTO: 41.8 % (ref 42–52)
HGB BLD-MCNC: 13.5 G/DL (ref 13.5–18)
IMM GRANULOCYTES # BLD AUTO: 0.03 K/UL
IMM GRANULOCYTES NFR BLD: 0 %
LIPASE SERPL-CCNC: 55 U/L (ref 13–60)
LYMPHOCYTES NFR BLD: 1.73 K/UL
LYMPHOCYTES RELATIVE PERCENT: 24 % (ref 24–44)
MAGNESIUM SERPL-MCNC: 2.2 MG/DL (ref 1.8–2.4)
MCH RBC QN AUTO: 30.6 PG (ref 27–31)
MCHC RBC AUTO-ENTMCNC: 32.3 G/DL (ref 32–36)
MCV RBC AUTO: 94.8 FL (ref 78–100)
MONOCYTES NFR BLD: 0.63 K/UL
MONOCYTES NFR BLD: 9 % (ref 0–4)
NEUTROPHILS NFR BLD: 62 % (ref 36–66)
NEUTS SEG NFR BLD: 4.4 K/UL
PLATELET # BLD AUTO: 225 K/UL (ref 140–440)
PMV BLD AUTO: 9.7 FL (ref 7.5–11.1)
POTASSIUM SERPL-SCNC: 4 MMOL/L (ref 3.5–5.1)
PROT SERPL-MCNC: 6.9 G/DL (ref 6.4–8.2)
RBC # BLD AUTO: 4.41 M/UL (ref 4.6–6.2)
SODIUM SERPL-SCNC: 141 MMOL/L (ref 136–145)
TROPONIN I SERPL HS-MCNC: 22 NG/L (ref 0–22)
TSH SERPL DL<=0.05 MIU/L-ACNC: 2.87 UIU/ML (ref 0.27–4.2)
WBC OTHER # BLD: 7.1 K/UL (ref 4–10.5)

## 2025-03-01 PROCEDURE — 6370000000 HC RX 637 (ALT 250 FOR IP): Performed by: STUDENT IN AN ORGANIZED HEALTH CARE EDUCATION/TRAINING PROGRAM

## 2025-03-01 PROCEDURE — 84443 ASSAY THYROID STIM HORMONE: CPT

## 2025-03-01 PROCEDURE — 82248 BILIRUBIN DIRECT: CPT

## 2025-03-01 PROCEDURE — 80053 COMPREHEN METABOLIC PANEL: CPT

## 2025-03-01 PROCEDURE — 36415 COLL VENOUS BLD VENIPUNCTURE: CPT

## 2025-03-01 PROCEDURE — 99284 EMERGENCY DEPT VISIT MOD MDM: CPT

## 2025-03-01 PROCEDURE — 84484 ASSAY OF TROPONIN QUANT: CPT

## 2025-03-01 PROCEDURE — 99285 EMERGENCY DEPT VISIT HI MDM: CPT

## 2025-03-01 PROCEDURE — 6360000002 HC RX W HCPCS: Performed by: STUDENT IN AN ORGANIZED HEALTH CARE EDUCATION/TRAINING PROGRAM

## 2025-03-01 PROCEDURE — 6360000002 HC RX W HCPCS: Performed by: EMERGENCY MEDICINE

## 2025-03-01 PROCEDURE — 96372 THER/PROPH/DIAG INJ SC/IM: CPT

## 2025-03-01 PROCEDURE — 83735 ASSAY OF MAGNESIUM: CPT

## 2025-03-01 PROCEDURE — 83690 ASSAY OF LIPASE: CPT

## 2025-03-01 PROCEDURE — 85025 COMPLETE CBC W/AUTO DIFF WBC: CPT

## 2025-03-01 PROCEDURE — 71045 X-RAY EXAM CHEST 1 VIEW: CPT

## 2025-03-01 PROCEDURE — 96374 THER/PROPH/DIAG INJ IV PUSH: CPT

## 2025-03-01 PROCEDURE — 96375 TX/PRO/DX INJ NEW DRUG ADDON: CPT

## 2025-03-01 PROCEDURE — 93005 ELECTROCARDIOGRAM TRACING: CPT | Performed by: STUDENT IN AN ORGANIZED HEALTH CARE EDUCATION/TRAINING PROGRAM

## 2025-03-01 RX ORDER — ONDANSETRON 2 MG/ML
4 INJECTION INTRAMUSCULAR; INTRAVENOUS ONCE
Status: COMPLETED | OUTPATIENT
Start: 2025-03-01 | End: 2025-03-01

## 2025-03-01 RX ORDER — DICYCLOMINE HYDROCHLORIDE 10 MG/ML
20 INJECTION INTRAMUSCULAR ONCE
Status: COMPLETED | OUTPATIENT
Start: 2025-03-01 | End: 2025-03-01

## 2025-03-01 RX ORDER — KETOROLAC TROMETHAMINE 15 MG/ML
15 INJECTION, SOLUTION INTRAMUSCULAR; INTRAVENOUS ONCE
Status: COMPLETED | OUTPATIENT
Start: 2025-03-01 | End: 2025-03-01

## 2025-03-01 RX ORDER — DICYCLOMINE HCL 20 MG
10 TABLET ORAL ONCE
Status: COMPLETED | OUTPATIENT
Start: 2025-03-01 | End: 2025-03-01

## 2025-03-01 RX ADMIN — KETOROLAC TROMETHAMINE 15 MG: 15 INJECTION, SOLUTION INTRAMUSCULAR; INTRAVENOUS at 19:06

## 2025-03-01 RX ADMIN — ONDANSETRON 4 MG: 2 INJECTION INTRAMUSCULAR; INTRAVENOUS at 19:11

## 2025-03-01 RX ADMIN — DICYCLOMINE HYDROCHLORIDE 10 MG: 20 TABLET ORAL at 19:05

## 2025-03-01 RX ADMIN — KETOROLAC TROMETHAMINE 15 MG: 15 INJECTION, SOLUTION INTRAMUSCULAR; INTRAVENOUS at 08:00

## 2025-03-01 RX ADMIN — DICYCLOMINE HYDROCHLORIDE 20 MG: 10 INJECTION, SOLUTION INTRAMUSCULAR at 08:00

## 2025-03-01 ASSESSMENT — PAIN - FUNCTIONAL ASSESSMENT: PAIN_FUNCTIONAL_ASSESSMENT: 0-10

## 2025-03-01 ASSESSMENT — PAIN DESCRIPTION - DESCRIPTORS
DESCRIPTORS: CRAMPING
DESCRIPTORS: ACHING

## 2025-03-01 ASSESSMENT — PAIN SCALES - GENERAL
PAINLEVEL_OUTOF10: 5
PAINLEVEL_OUTOF10: 10
PAINLEVEL_OUTOF10: 5
PAINLEVEL_OUTOF10: 6

## 2025-03-01 ASSESSMENT — PAIN DESCRIPTION - LOCATION
LOCATION: ABDOMEN
LOCATION: ABDOMEN

## 2025-03-01 ASSESSMENT — PAIN DESCRIPTION - PAIN TYPE
TYPE: CHRONIC PAIN
TYPE: CHRONIC PAIN

## 2025-03-01 NOTE — ED PROVIDER NOTES
History    Not on file   Tobacco Use    Smoking status: Never    Smokeless tobacco: Never   Vaping Use    Vaping status: Never Used   Substance and Sexual Activity    Alcohol use: Never    Drug use: Never    Sexual activity: Not on file   Other Topics Concern    Not on file   Social History Narrative    Not on file     Social Determinants of Health     Financial Resource Strain: Low Risk  (7/24/2020)    Received from [facility]    Overall Financial Resource Strain (CARDIA)     Difficulty of Paying Living Expenses: Not hard at all   Food Insecurity: Unknown (1/23/2024)    Received from [facility]    Food Insecurities     Worried about running out of food: Not on file     Food Bought: Not on file   Transportation Needs: Unknown (1/23/2024)    Received from [facility]    Transportation     Worried about transportation: Not on file   Physical Activity: Sufficiently Active (7/24/2020)    Received from [facility]    Exercise Vital Sign     Days of Exercise per Week: 5 days     Minutes of Exercise per Session: 30 min   Stress: Stress Concern Present (7/24/2020)    Received from [facility]    [employer] [place] [employer] - Occupational Stress Questionnaire     Feeling of Stress : To some extent   Social Connections: Socially Isolated (7/24/2020)    Received from [facility]    Social Connection and Isolation Panel [NHANES]     Frequency of Communication with Friends and Family: Once a week     Frequency of Social Gatherings with Friends and Family: Never     Attends Pentecostalism Services: Never     Active Member of Clubs or Organizations: Yes     Attends Club or Organization Meetings: 1 to 4 times per year     Marital Status: Never    Intimate Partner Violence: Not At Risk (6/1/2024)    Received from [facility]    Humiliation, Afraid, Rape, and Kick  questionnaire     Fear of Current or Ex-Partner: No     Emotionally Abused: No     Physically Abused: No     Sexually Abused: No   Housing Stability: Unknown (1/23/2024)    Received from [facility]    Housing/Utilities     Worried about losing home: Not on file     Stayed outside house: Not on file     Unable to get utilities: Not on file     No current facility-administered medications for this encounter.     Current Outpatient Medications   Medication Sig Dispense Refill    docusate sodium (COLACE) 100 MG capsule Take 1 capsule by mouth 2 times daily 40 capsule 0    hydrocortisone 1 % cream Apply topically 2 times daily. 28 g 0    witch hazel-glycerin (TUCKS) pad Place rectally as needed. 50 each 4    dicyclomine (BENTYL) 20 MG tablet Take 1 tablet by mouth 4 times daily 30 tablet 0    famotidine (PEPCID) 20 MG tablet Take 1 tablet by mouth 2 times daily 60 tablet 0    ondansetron (ZOFRAN-ODT) 4 MG disintegrating tablet Take 1 tablet by mouth 3 times daily as needed for Nausea or Vomiting 21 tablet 0    ibuprofen (ADVIL;MOTRIN) 600 MG tablet Take 1 tablet by mouth 3 times daily as needed for Pain (Patient not taking: Reported on 1/14/2025) 30 tablet 0         Nursing Notes Reviewed        PHYSICAL EXAM     ED Triage Vitals   BP Systolic BP Percentile Diastolic BP Percentile Temp Temp src Pulse Resp SpO2   -- -- -- -- -- -- -- --      Height Weight         -- --           Triage VS:    ED Triage Vitals   Encounter Vitals Group      BP       Systolic BP Percentile       Diastolic BP Percentile       Pulse       Resp       Temp       Temp src       SpO2       Weight       Height       Head Circumference       Peak Flow       Pain Score       Pain Loc       Pain Education       Exclude from Growth Chart      Initial vital signs and nursing assessment reviewed and vitals are/show: Afebrile, Normotensive, Normocardic, and Normal RR.   Pulsoximetry is normal per my  literacy     PROCEDURES: (None if blank)  Procedures:       CRITICAL CARE: (None if blank)        MDM  Number of Diagnoses or Management Options  Palpitations: new, needed workup     Amount and/or Complexity of Data Reviewed  Clinical lab tests: ordered and reviewed  Tests in the radiology section of CPT®: ordered and reviewed  Tests in the medicine section of CPT®: reviewed and ordered  Discussion of test results with the performing providers: yes  Decide to obtain previous medical records or to obtain history from someone other than the patient: yes  Obtain history from someone other than the patient: yes  Review and summarize past medical records: yes  Discuss the patient with other providers: no  Independent visualization of images, tracings, or specimens: yes    /  ED Course as of 03/02/25 1131   Sat Mar 01, 2025   1826 Has a care management plan that does include chest pain abdominal pain psychosis substance abuse and malingering [CR]   1845 EKG interpretation:  Sinus rhythm.  Normal rate, 75 bpm.  NV interval 120 QRS 90  QTc 439.  No significant acute ischemic ST changes.  No LVH.  Normal axis.  As interpreted by me. [CR]   1932 CXR:IMPRESSION: No acute pulmonary process. [CR]   1950 BMP lipase hepatic function panel magnesium TSH unremarkable troponin 22 [CR]   1957 Per lab, CBC should be take approx 5 minutes. [CR]   2000 CBC unremarkable [CR]   2003 Discussed results with patient.  Agreeable for discharge with outpatient follow up.  [CR]      ED Course User Index  [CR] Braulio Pineda MD           Independent Imaging Interpretation by me: please seen ED course/above/below  EKG (if obtained): (All EKG's are interpreted by myself in the absence of a cardiologist) Please see ED course/above/below    Is this patient to be included in the SEP-1 Core Measure due to severe sepsis or septic shock?   No   Exclusion criteria - the patient is NOT to be included for SEP-1 Core Measure due to:  Infection is

## 2025-03-01 NOTE — ED NOTES
Patient tolerated PO intake, lunch box and 2 glasses of eater without difficulty prior to discharge.

## 2025-03-01 NOTE — ED PROVIDER NOTES
Emergency Department Encounter    Patient: Mario Orellana  MRN: [id number]  : 1990  Date of Evaluation: 3/1/2025  ED Provider:  Judit Chaparro MD    Triage Chief Complaint:   Abdominal Cramping    [place]:  Mario Orellana is a 34 y.o. male that presents with complaint he is having abdominal cramping and hadn't eaten yet this morning. Would like to have some bentyl and toradol. Denies nausea and vomiting. Denies diarrhea. Denies constipation. Denies fevers. Denies other complaints or needs other than wanting something to drink.     ROS - see HPI, below listed is current ROS at time of my eval:  10 systems reviewed and negative except as above.     Past Medical History:   Diagnosis Date    Bipolar 1 disorder (HCC)     Chronic generalized abdominal pain     Depression     GERD (gastroesophageal reflux disease)     IBS (irritable bowel syndrome)     Mental impairment     OCD (obsessive compulsive disorder)     Schizo affective schizophrenia (HCC)      Past Surgical History:   Procedure Laterality Date    COLONOSCOPY      does not know    EGD      does not know     Family History   Problem Relation Age of Onset    Colon Cancer Neg Hx     Esophageal Cancer Neg Hx     Liver Cancer Neg Hx     Rectal Cancer Neg Hx     Stomach Cancer Neg Hx      Social History     Socioeconomic History    Marital status: Unknown     Spouse name: Not on file    Number of children: Not on file    Years of education: Not on file    Highest education level: Not on file   Occupational History    Not on file   Tobacco Use    Smoking status: Never    Smokeless tobacco: Never   Vaping Use    Vaping status: Never Used   Substance and Sexual Activity    Alcohol use: Never    Drug use: Never    Sexual activity: Not on file   Other Topics Concern    Not on file   Social History Narrative    Not on file     Social Determinants of Health     Financial Resource Strain: Low Risk  (2020)    [facility]     Overall Financial Resource Strain (CARDIA)     Difficulty of Paying Living Expenses: Not hard at all   Food Insecurity: Unknown (1/23/2024)    Received from [facility]    Food Insecurities     Worried about running out of food: Not on file     Food Bought: Not on file   Transportation Needs: Unknown (1/23/2024)    Received from [facility]    Transportation     Worried about transportation: Not on file   Physical Activity: Sufficiently Active (7/24/2020)    Received from [facility]    Exercise Vital Sign     Days of Exercise per Week: 5 days     Minutes of Exercise per Session: 30 min   Stress: Stress Concern Present (7/24/2020)    Received from [facility]    [place] of Occupational Health - Occupational Stress Questionnaire     Feeling of Stress : To some extent   Social Connections: Socially Isolated (7/24/2020)    Received from [facility]    Social Connection and Isolation Panel [NHANES]     Frequency of Communication with Friends and Family: Once a week     Frequency of Social Gatherings with Friends and Family: Never     Attends Rastafari Services: Never     Active Member of Clubs or Organizations: Yes     Attends Club or Organization Meetings: 1 to 4 times per year     Marital Status: Never    Intimate Partner Violence: Not At Risk (6/1/2024)    Received from [facility]    Humiliation, Afraid, Rape, and Kick questionnaire     Fear of Current or Ex-Partner: No     Emotionally Abused: No     Physically Abused: No     Sexually Abused: No   Housing Stability: Unknown (1/23/2024)    Received from [facility]    Housing/Utilities     Worried about losing home: Not on file     Stayed outside house: Not on file     Unable to get utilities: Not on file     No current facility-administered medications for this encounter.     Current  (if obtained):  Radiologist's Report Reviewed:  XR ABDOMEN (KUB) (SINGLE AP VIEW)    Result Date: 2/28/2025  PROCEDURE: XR ABDOMEN (KUB) (SINGLE AP VIEW) DATE OF EXAM:  2/28/2025 13:37 DEMOGRAPHICS: 34 years old Male INDICATION: constipation COMPARISON: 2/13/2025 TECHNIQUE: 1 AP supine view(s) of the abdomen. FINDINGS: No areas of abnormal findings to suggest bowel obstruction. Bowel gas pattern appears similar to 2/13/2025. Small stool burden. No acute osseous abnormality. Soft tissues are unremarkable. IMPRESSION: 1.  No radiographic evidence of acute findings. 2.  Additional findings as above. This dictation was created with voice recognition software.  While attempts have  been made to review the dictation as it is transcribed, on occasion the spoken word can be misinterpreted by the technology leading to omissions or inappropriate words, phrases or sentences.  Dictated and Electronically Signed By: David Kelly MD 2/28/2025 13:18              MDM:  CC/HPI Summary, DDx, ED Course, and Reassessment: Patient presents with chronic abdominal cramping      History from : Patient    Limitations to history : None    Patient was given the following medications:  Medications   dicyclomine (BENTYL) injection 20 mg (20 mg IntraMUSCular Given 3/1/25 0800)   ketorolac (TORADOL) injection 15 mg (15 mg IntraMUSCular Given 3/1/25 0800)       Chronic conditions affecting care: Chronic abdominal pain    Social Determinants : Patient is Homeless: Provided resource guide    Records Reviewed : Outpatient Notes seen by case management yesterday and given community resources for outpatient setting .  Patient was seen at [facility] on 2/20/2025, from homeless shelter for nausea and diarrhea he was covered in stool.  From his waist to his ankles, did not have any evidence of bleeding, was discharged      Disposition Considerations (tests considered but not done, Shared Decision Making, Pt Expectation of Test or Tx.):     Patient is

## 2025-03-01 NOTE — DISCHARGE INSTRUCTIONS
Please go to [facility] or call [phone] to find a new provider.     Or use QR code below:

## 2025-03-02 ENCOUNTER — HOSPITAL ENCOUNTER (EMERGENCY)
Age: 35
Discharge: HOME | End: 2025-03-02
Attending: STUDENT IN AN ORGANIZED HEALTH CARE EDUCATION/TRAINING PROGRAM
Payer: MEDICARE

## 2025-03-02 VITALS
SYSTOLIC BLOOD PRESSURE: 118 MMHG | OXYGEN SATURATION: 99 % | HEART RATE: 96 BPM | DIASTOLIC BLOOD PRESSURE: 81 MMHG | TEMPERATURE: 98.1 F | RESPIRATION RATE: 16 BRPM

## 2025-03-02 DIAGNOSIS — R10.9 CHRONIC ABDOMINAL PAIN: Primary | ICD-10-CM

## 2025-03-02 DIAGNOSIS — G89.29 CHRONIC ABDOMINAL PAIN: Primary | ICD-10-CM

## 2025-03-02 LAB
EKG ATRIAL RATE: 75 BPM
EKG DIAGNOSIS: NORMAL
EKG P AXIS: 35 DEGREES
EKG P-R INTERVAL: 120 MS
EKG Q-T INTERVAL: 394 MS
EKG QRS DURATION: 90 MS
EKG QTC CALCULATION (BAZETT): 439 MS
EKG R AXIS: 24 DEGREES
EKG T AXIS: 32 DEGREES
EKG VENTRICULAR RATE: 75 BPM

## 2025-03-02 PROCEDURE — 93010 ELECTROCARDIOGRAM REPORT: CPT | Performed by: INTERNAL MEDICINE

## 2025-03-02 PROCEDURE — 96372 THER/PROPH/DIAG INJ SC/IM: CPT

## 2025-03-02 PROCEDURE — 6370000000 HC RX 637 (ALT 250 FOR IP): Performed by: STUDENT IN AN ORGANIZED HEALTH CARE EDUCATION/TRAINING PROGRAM

## 2025-03-02 PROCEDURE — 6360000002 HC RX W HCPCS: Performed by: STUDENT IN AN ORGANIZED HEALTH CARE EDUCATION/TRAINING PROGRAM

## 2025-03-02 PROCEDURE — 99284 EMERGENCY DEPT VISIT MOD MDM: CPT

## 2025-03-02 RX ORDER — KETOROLAC TROMETHAMINE 15 MG/ML
15 INJECTION, SOLUTION INTRAMUSCULAR; INTRAVENOUS ONCE
Status: COMPLETED | OUTPATIENT
Start: 2025-03-02 | End: 2025-03-02

## 2025-03-02 RX ORDER — DICYCLOMINE HYDROCHLORIDE 10 MG/1
10 CAPSULE ORAL 4 TIMES DAILY PRN
Qty: 28 CAPSULE | Refills: 0 | Status: SHIPPED | OUTPATIENT
Start: 2025-03-02 | End: 2025-03-09

## 2025-03-02 RX ORDER — DICYCLOMINE HCL 20 MG
10 TABLET ORAL ONCE
Status: COMPLETED | OUTPATIENT
Start: 2025-03-02 | End: 2025-03-02

## 2025-03-02 RX ADMIN — DICYCLOMINE HYDROCHLORIDE 10 MG: 20 TABLET ORAL at 18:48

## 2025-03-02 RX ADMIN — KETOROLAC TROMETHAMINE 15 MG: 15 INJECTION, SOLUTION INTRAMUSCULAR; INTRAVENOUS at 18:48

## 2025-03-02 NOTE — ED PROVIDER NOTES
as needed (cramping abominal pain)       I have reviewed and interpreted all of the currently available lab results from this visit (if applicable):    Radiographs (if obtained):  Radiologist's Report Reviewed:  No orders to display     No results found.    LABS: (none if blank)  Labs Reviewed - No data to display    FINAL IMPRESSION      Final diagnoses:   Chronic abdominal pain     Condition: stable  Dispo: Discharge      This transcription was electronically signed. Parts of this transcriptions may have been dictated by use of voice recognition software and electronically transcribed, and parts may have been transcribed with the assistance of an ED scribe and may contain errors related to that system including errors in grammar, punctuation, and spelling, as well as words and phrases that may be inappropriate.  The transcription may contain errors not detected in proofreading.  Efforts were made to edit the dictations.    Electronically Signed: Braulio Pineda MD, 03/02/25, 5:55 PM    I am the Primary Clinician of Record.      Clinical Impression:  1. Chronic abdominal pain      Disposition referral (if applicable):  EUGENIO ALVARADO [address] [place] [id number] [phone]        Disposition medications (if applicable):  New Prescriptions    DICYCLOMINE (BENTYL) 10 MG CAPSULE    Take 1 capsule by mouth 4 times daily as needed (cramping abominal pain)     ED Provider Disposition Time  DISPOSITION Discharge - Pending Orders Complete 03/02/2025 05:48:10 PM   DISPOSITION CONDITION Stable                Braulio Pineda MD  03/02/25 0628

## 2025-03-02 NOTE — DISCHARGE INSTRUCTIONS
[facility]  [address] [place] [address]  [email or web address]    [facility]  [phone]   [facility]  Service description:ALL INTAKES [phone] [facility] offers emergency shelter for families and single women and [facility] shelters single men from the [facility]. Case management and specific assistance services are offered.   Intake procedure:Must be able to provide picture I.D., Birth Certificate, proof of custody, and social security numbers and police/ criminal history check. Background check from the Police Department.  Fees:None  Eligibility:Due to safety concerns, does not accept active domestic violence cases, no current warrants or assault charges within one year.  Hours: 365 days a year, Check-In Hours 8 am-4 pm, after 8 pm doors close, no admission after 8 pm.  Languages other than English:English  Services:[employer] Donation Programs, Donation Drop Off Points, Homeless Shelter, Volunteer Opportunities  Site overview:[facility] offers shelter, 24 hours a day 365 days a year for families and single women at [facility] and for single men at [facility].  Hours:shelters open 24/7, 365 days a year  Site hours:open 24/7, 365 days a year      [facility]  [address] [place] [id number]  [email or web address]  Phone:  [phone]  Service description:[facility] offers emergency shelter for single, adult males from the [facility]. Case management and specific assistance services are offered.  Intake procedure:Must provide picture I.D. and background check from the police department, no current warrants or assault charges within one year.  Fees:None.  Eligibility:Must be single, adult male.  Hours: , Check-In Hours 8 am-4 pm, after 8 pm doors close, no admission after 8 pm.  Languages other than  3rd Tuesday of each month 12:00pm - 2:00pm   Your current Photo ID                                        Proof of current address   [employer]                         [phone] [address])                                                                                                                                                                                                                                                                                               Walk-Ins Only 10:00am - 1:30pm 1 time per month Your current Photo ID                                        Proof of current address   [facility]                        [phone]    [address]                                                             3rd Tuesday of each month 11am-1pm Once per month Your current Photo ID                                        Proof of current address   [facility]                                                                                                                                                                                                                                                                                                                                       [address] [facility] [place]                                                                                                                                                                                                                                                                                                                                              Call for appointment       Call For

## 2025-03-02 NOTE — DISCHARGE INSTRUCTIONS
Department.  Fees:None  Eligibility:Due to safety concerns, does not accept active domestic violence cases, no current warrants or assault charges within one year.  Hours: 365 days a year, Check-In Hours 8 am-4 pm, after 8 pm doors close, no admission after 8 pm.  Languages other than English:English  Services:[employer] Donation Programs, Donation Drop Off Points, Homeless Shelter, Volunteer Opportunities  Site overview:[facility] offers shelter, 24 hours a day 365 days a year for families and single women at [facility] and for single men at [facility].  Hours:shelters open 24/7, 365 days a year  Site hours:open 24/7, [place] [address]  [email or web address]  Phone:  [phone]  Service description:[facility] offers emergency shelter for single, adult males from the [facility]. Case management and specific assistance services are offered.  Intake procedure:Must provide picture I.D. and background check from the police department, no current warrants or assault charges within one year.  Fees:None.  Eligibility:Must be single, adult male.  Hours: , Check-In Hours 8 am-4 pm, after 8 pm doors close, no admission after 8 pm.  Languages other than English:English  Services:Homeless Shelter for Men  Site overview:[facility] offers emergency shelter for single, adult males from the [facility]. Case management and specific assistance services are offered.  Site hours:24 hrs daily, 365 days a year  Other services at this location  HOUSING SERVICES  PERMANENT HOUSING WITH SUPPORTIVE SERVICES  _________________________________________________________________________

## 2025-03-04 ENCOUNTER — HOSPITAL ENCOUNTER (EMERGENCY)
Age: 35
Discharge: HOME | End: 2025-03-04
Attending: EMERGENCY MEDICINE
Payer: MEDICARE

## 2025-03-04 VITALS
TEMPERATURE: 98.8 F | OXYGEN SATURATION: 97 % | DIASTOLIC BLOOD PRESSURE: 90 MMHG | HEART RATE: 100 BPM | BODY MASS INDEX: 26.05 KG/M2 | SYSTOLIC BLOOD PRESSURE: 132 MMHG | RESPIRATION RATE: 16 BRPM | HEIGHT: 63 IN | WEIGHT: 147 LBS

## 2025-03-04 DIAGNOSIS — R10.9 ABDOMINAL PAIN, UNSPECIFIED ABDOMINAL LOCATION: Primary | ICD-10-CM

## 2025-03-04 LAB
ALBUMIN SERPL-MCNC: 4.6 G/DL (ref 3.4–5)
ALBUMIN/GLOB SERPL: 1.6 {RATIO} (ref 1.1–2.2)
ALP SERPL-CCNC: 80 U/L (ref 40–129)
ALT SERPL-CCNC: 18 U/L (ref 10–40)
AMPHET UR QL SCN: NEGATIVE
ANION GAP SERPL CALCULATED.3IONS-SCNC: 11 MMOL/L (ref 9–17)
AST SERPL-CCNC: 25 U/L (ref 15–37)
BARBITURATES UR QL SCN: NEGATIVE
BASOPHILS # BLD: 0.02 K/UL
BASOPHILS NFR BLD: 0 % (ref 0–1)
BENZODIAZ UR QL: NEGATIVE
BILIRUB SERPL-MCNC: 0.3 MG/DL (ref 0–1)
BILIRUB UR QL STRIP: NEGATIVE
BUN SERPL-MCNC: 11 MG/DL (ref 7–20)
CALCIUM SERPL-MCNC: 10 MG/DL (ref 8.3–10.6)
CANNABINOIDS UR QL SCN: NEGATIVE
CHLORIDE SERPL-SCNC: 102 MMOL/L (ref 99–110)
CLARITY UR: CLEAR
CO2 SERPL-SCNC: 30 MMOL/L (ref 21–32)
COCAINE UR QL SCN: NEGATIVE
COLOR UR: YELLOW
COMMENT: ABNORMAL
CREAT SERPL-MCNC: 1.1 MG/DL (ref 0.9–1.3)
EOSINOPHIL # BLD: 0.09 K/UL
EOSINOPHILS RELATIVE PERCENT: 2 % (ref 0–3)
ERYTHROCYTE [DISTWIDTH] IN BLOOD BY AUTOMATED COUNT: 12.1 % (ref 11.7–14.9)
FENTANYL UR QL: NEGATIVE
GFR, ESTIMATED: 80 ML/MIN/1.73M2
GLUCOSE SERPL-MCNC: 101 MG/DL (ref 74–99)
GLUCOSE UR STRIP-MCNC: NEGATIVE MG/DL
HCT VFR BLD AUTO: 43.8 % (ref 42–52)
HGB BLD-MCNC: 14.3 G/DL (ref 13.5–18)
HGB UR QL STRIP.AUTO: NEGATIVE
IMM GRANULOCYTES # BLD AUTO: 0.02 K/UL
IMM GRANULOCYTES NFR BLD: 0 %
KETONES UR STRIP-MCNC: NEGATIVE MG/DL
LEUKOCYTE ESTERASE UR QL STRIP: NEGATIVE
LIPASE SERPL-CCNC: 44 U/L (ref 13–60)
LYMPHOCYTES NFR BLD: 0.94 K/UL
LYMPHOCYTES RELATIVE PERCENT: 16 % (ref 24–44)
MCH RBC QN AUTO: 30.9 PG (ref 27–31)
MCHC RBC AUTO-ENTMCNC: 32.6 G/DL (ref 32–36)
MCV RBC AUTO: 94.6 FL (ref 78–100)
MONOCYTES NFR BLD: 0.53 K/UL
MONOCYTES NFR BLD: 9 % (ref 0–4)
NEUTROPHILS NFR BLD: 73 % (ref 36–66)
NEUTS SEG NFR BLD: 4.27 K/UL
NITRITE UR QL STRIP: NEGATIVE
OPIATES UR QL SCN: NEGATIVE
OXYCODONE UR QL SCN: NEGATIVE
PH UR STRIP: 7 [PH] (ref 5–8)
PLATELET # BLD AUTO: 227 K/UL (ref 140–440)
PMV BLD AUTO: 9.5 FL (ref 7.5–11.1)
POTASSIUM SERPL-SCNC: 4.5 MMOL/L (ref 3.5–5.1)
PROT SERPL-MCNC: 7.4 G/DL (ref 6.4–8.2)
PROT UR STRIP-MCNC: NEGATIVE MG/DL
RBC # BLD AUTO: 4.63 M/UL (ref 4.6–6.2)
SODIUM SERPL-SCNC: 143 MMOL/L (ref 136–145)
SP GR UR STRIP: <1.005 (ref 1–1.03)
TEST INFORMATION: NORMAL
UROBILINOGEN UR STRIP-ACNC: 0.2 EU/DL (ref 0–1)
WBC OTHER # BLD: 5.9 K/UL (ref 4–10.5)

## 2025-03-04 PROCEDURE — 6370000000 HC RX 637 (ALT 250 FOR IP): Performed by: EMERGENCY MEDICINE

## 2025-03-04 PROCEDURE — 80053 COMPREHEN METABOLIC PANEL: CPT

## 2025-03-04 PROCEDURE — 80307 DRUG TEST PRSMV CHEM ANLYZR: CPT

## 2025-03-04 PROCEDURE — 99284 EMERGENCY DEPT VISIT MOD MDM: CPT

## 2025-03-04 PROCEDURE — 83690 ASSAY OF LIPASE: CPT

## 2025-03-04 PROCEDURE — 6360000002 HC RX W HCPCS: Performed by: EMERGENCY MEDICINE

## 2025-03-04 PROCEDURE — 81003 URINALYSIS AUTO W/O SCOPE: CPT

## 2025-03-04 PROCEDURE — 96372 THER/PROPH/DIAG INJ SC/IM: CPT

## 2025-03-04 PROCEDURE — 85025 COMPLETE CBC W/AUTO DIFF WBC: CPT

## 2025-03-04 RX ORDER — DICYCLOMINE HYDROCHLORIDE 10 MG/ML
20 INJECTION INTRAMUSCULAR ONCE
Status: COMPLETED | OUTPATIENT
Start: 2025-03-04 | End: 2025-03-04

## 2025-03-04 RX ORDER — KETOROLAC TROMETHAMINE 15 MG/ML
30 INJECTION, SOLUTION INTRAMUSCULAR; INTRAVENOUS ONCE
Status: DISCONTINUED | OUTPATIENT
Start: 2025-03-04 | End: 2025-03-04

## 2025-03-04 RX ORDER — FAMOTIDINE 20 MG/1
20 TABLET, FILM COATED ORAL 2 TIMES DAILY
Qty: 14 TABLET | Refills: 0 | Status: SHIPPED | OUTPATIENT
Start: 2025-03-04

## 2025-03-04 RX ORDER — CALCIUM CARBONATE 500 MG/1
1 TABLET, CHEWABLE ORAL DAILY
Qty: 30 TABLET | Refills: 0 | Status: SHIPPED | OUTPATIENT
Start: 2025-03-04 | End: 2025-04-03

## 2025-03-04 RX ORDER — DICYCLOMINE HCL 20 MG
20 TABLET ORAL 4 TIMES DAILY
Qty: 40 TABLET | Refills: 0 | Status: SHIPPED | OUTPATIENT
Start: 2025-03-04

## 2025-03-04 RX ORDER — KETOROLAC TROMETHAMINE 15 MG/ML
30 INJECTION, SOLUTION INTRAMUSCULAR; INTRAVENOUS ONCE
Status: COMPLETED | OUTPATIENT
Start: 2025-03-04 | End: 2025-03-04

## 2025-03-04 RX ORDER — FAMOTIDINE 20 MG/1
20 TABLET, FILM COATED ORAL ONCE
Status: COMPLETED | OUTPATIENT
Start: 2025-03-04 | End: 2025-03-04

## 2025-03-04 RX ORDER — MAGNESIUM HYDROXIDE/ALUMINUM HYDROXICE/SIMETHICONE 120; 1200; 1200 MG/30ML; MG/30ML; MG/30ML
30 SUSPENSION ORAL ONCE
Status: COMPLETED | OUTPATIENT
Start: 2025-03-04 | End: 2025-03-04

## 2025-03-04 RX ORDER — FAMOTIDINE 10 MG/ML
20 INJECTION, SOLUTION INTRAVENOUS ONCE
Status: DISCONTINUED | OUTPATIENT
Start: 2025-03-04 | End: 2025-03-04

## 2025-03-04 RX ORDER — ACETAMINOPHEN 500 MG
500 TABLET ORAL 4 TIMES DAILY PRN
Qty: 360 TABLET | Refills: 1 | Status: SHIPPED | OUTPATIENT
Start: 2025-03-04

## 2025-03-04 RX ADMIN — ALUMINUM HYDROXIDE, MAGNESIUM HYDROXIDE, AND SIMETHICONE 30 ML: 200; 200; 20 SUSPENSION ORAL at 08:17

## 2025-03-04 RX ADMIN — DICYCLOMINE HYDROCHLORIDE 20 MG: 10 INJECTION, SOLUTION INTRAMUSCULAR at 08:16

## 2025-03-04 RX ADMIN — FAMOTIDINE 20 MG: 20 TABLET, FILM COATED ORAL at 08:17

## 2025-03-04 RX ADMIN — KETOROLAC TROMETHAMINE 30 MG: 15 INJECTION, SOLUTION INTRAMUSCULAR; INTRAVENOUS at 08:22

## 2025-03-04 ASSESSMENT — PAIN - FUNCTIONAL ASSESSMENT: PAIN_FUNCTIONAL_ASSESSMENT: 0-10

## 2025-03-04 ASSESSMENT — PAIN DESCRIPTION - DESCRIPTORS: DESCRIPTORS: CRAMPING

## 2025-03-04 ASSESSMENT — PAIN SCALES - GENERAL
PAINLEVEL_OUTOF10: 10
PAINLEVEL_OUTOF10: 8
PAINLEVEL_OUTOF10: 0
PAINLEVEL_OUTOF10: 8

## 2025-03-04 ASSESSMENT — ENCOUNTER SYMPTOMS
ALLERGIC/IMMUNOLOGIC NEGATIVE: 1
EYES NEGATIVE: 1
ABDOMINAL PAIN: 1
DIARRHEA: 1
RESPIRATORY NEGATIVE: 1

## 2025-03-04 ASSESSMENT — PAIN DESCRIPTION - LOCATION
LOCATION: ABDOMEN
LOCATION: ABDOMEN

## 2025-03-04 NOTE — ED PROVIDER NOTES
did do basic lab work will give him Toradol Bentyl Pepcid and Maalox, I do not think he needs IV fluids I do not think he needs imaging I will get basic lab work I do not think he has appendicitis perforation obstruction volvulus intussusception cholecystitis pancreatitis ischemia AAA kidney stone.  Patient rechecked, doing well he feels better after eating.  This time patient had negative lab work will discharge home with the same medication given return precautions and follow-up information.  I do not think he has emergent pathology he is in the hallway walking around talking the nurses etc. appears otherwise well    CLINICAL IMPRESSION:  Final diagnoses:   Abdominal pain, unspecified abdominal location       (Please note that portions of this note may have been completed with a voice recognition program. Efforts were made to edit the dictations but occasionally words aremis-transcribed.)    DISPOSITION REFERRAL (if applicable):  [facility] Emergency Department  [address]  [place] [address]  [phone]    If symptoms worsen    Abundio Orellana MD  [address]  [facility] [address] [phone]    Schedule an appointment as soon as possible for a visit in 1 day      Heladio Orellana MD  [address]  [facility] [address] [phone]    Schedule an appointment as soon as possible for a visit in 1 day  Gastroenterology      DISPOSITION MEDICATIONS (if applicable):  New Prescriptions    ACETAMINOPHEN (TYLENOL) 500 MG TABLET    Take 1 tablet by mouth 4 times daily as needed for Pain    CALCIUM CARBONATE (ANTACID) 500 MG CHEWABLE TABLET    Take 1 tablet by mouth daily    DICYCLOMINE (BENTYL) 20 MG TABLET    Take 1 tablet by mouth 4 times daily    FAMOTIDINE (PEPCID) 20 MG TABLET    Take 1 tablet by mouth 2 times daily          DO Petar Lion James, DO  03/04/25 2884

## 2025-03-04 NOTE — ED NOTES
Pt states having ABD cramping and it not feeling well this morning. States \"that shot\" really helps. During end of triage pt reports \"its in by butt\" referring to the discomfort. States \"I washed by butt this morning.\"

## 2025-03-05 ENCOUNTER — HOSPITAL ENCOUNTER (EMERGENCY)
Age: 35
Discharge: HOME | End: 2025-03-05
Payer: MEDICARE

## 2025-03-05 VITALS
OXYGEN SATURATION: 97 % | SYSTOLIC BLOOD PRESSURE: 139 MMHG | HEART RATE: 98 BPM | DIASTOLIC BLOOD PRESSURE: 100 MMHG | TEMPERATURE: 98.2 F | RESPIRATION RATE: 17 BRPM

## 2025-03-05 DIAGNOSIS — R10.9 CHRONIC ABDOMINAL PAIN: Primary | ICD-10-CM

## 2025-03-05 DIAGNOSIS — G89.29 CHRONIC ABDOMINAL PAIN: Primary | ICD-10-CM

## 2025-03-05 PROCEDURE — 99284 EMERGENCY DEPT VISIT MOD MDM: CPT

## 2025-03-05 PROCEDURE — 96372 THER/PROPH/DIAG INJ SC/IM: CPT

## 2025-03-05 PROCEDURE — 6360000002 HC RX W HCPCS: Performed by: PHYSICIAN ASSISTANT

## 2025-03-05 RX ORDER — DICYCLOMINE HYDROCHLORIDE 10 MG/ML
20 INJECTION INTRAMUSCULAR ONCE
Status: COMPLETED | OUTPATIENT
Start: 2025-03-05 | End: 2025-03-05

## 2025-03-05 RX ORDER — KETOROLAC TROMETHAMINE 15 MG/ML
30 INJECTION, SOLUTION INTRAMUSCULAR; INTRAVENOUS ONCE
Status: COMPLETED | OUTPATIENT
Start: 2025-03-05 | End: 2025-03-05

## 2025-03-05 RX ADMIN — KETOROLAC TROMETHAMINE 30 MG: 15 INJECTION, SOLUTION INTRAMUSCULAR; INTRAVENOUS at 13:41

## 2025-03-05 RX ADMIN — DICYCLOMINE HYDROCHLORIDE 20 MG: 10 INJECTION, SOLUTION INTRAMUSCULAR at 13:42

## 2025-03-05 NOTE — ED PROVIDER NOTES
[facility] EMERGENCY DEPARTMENT  EMERGENCY DEPARTMENT ENCOUNTER        Pt Name: Mario Orellana  MRN: [id number]  Birthdate 1990  Date of evaluation: 3/5/2025  Provider: Cesario Krueger PA-C  PCP: No primary care provider on file.  Note Started: 1:42 PM EST 3/5/25      TALA. I have evaluated this patient.        CHIEF COMPLAINT       Chief Complaint   Patient presents with    Abdominal Cramping     Here several times for the same        HISTORY OF PRESENT ILLNESS: 1 or more Elements     History From: patient    Limitations to history : None    Social Determinants Significantly Affecting Health : Patient has significant healthcare illiteracy. Additional time provided in explanations.    Chief Complaint: abd pain/cramping    Mario Orellana is a 34 y.o. male with past medical history of schizoaffective disorder, IBS/chronic abdominal, homelessness who presents complaining of abdominal cramping.  Patient reports he started having his typical abdominal cramping about an hour before arrival, states its like it always is.  He was seen here in the ER yesterday and on 3/1 and 3/2 for same.  States pain is the same as it was.  He had negative x-ray and labs in the last week without emergency.  Denies any trauma, fever, vomiting, diarrhea, constipation.  Patient requesting IM Toradol and Bentyl and something to eat.    Nursing Notes were all reviewed and agreed with or any disagreements were addressed in the HPI.    REVIEW OF SYSTEMS :      Review of Systems   All other systems reviewed and are negative.      Positives and Pertinent negatives as per HPI.     SURGICAL HISTORY     Past Surgical History:   Procedure Laterality Date    COLONOSCOPY      does not know    EGD      does not know       CURRENTMEDICATIONS       Previous Medications    ACETAMINOPHEN (TYLENOL) 500 MG TABLET    Take 1 tablet by mouth 4 times daily as needed for Pain    CALCIUM CARBONATE (ANTACID) 500 MG CHEWABLE TABLET

## 2025-03-06 ENCOUNTER — HOSPITAL ENCOUNTER (EMERGENCY)
Age: 35
Discharge: HOME | End: 2025-03-06
Attending: EMERGENCY MEDICINE
Payer: MEDICARE

## 2025-03-06 ENCOUNTER — HOSPITAL ENCOUNTER (EMERGENCY)
Age: 35
Discharge: HOME | End: 2025-03-06
Payer: MEDICARE

## 2025-03-06 VITALS
SYSTOLIC BLOOD PRESSURE: 148 MMHG | OXYGEN SATURATION: 100 % | HEART RATE: 98 BPM | TEMPERATURE: 98.1 F | RESPIRATION RATE: 16 BRPM | DIASTOLIC BLOOD PRESSURE: 83 MMHG

## 2025-03-06 VITALS
DIASTOLIC BLOOD PRESSURE: 82 MMHG | WEIGHT: 153.6 LBS | OXYGEN SATURATION: 100 % | SYSTOLIC BLOOD PRESSURE: 130 MMHG | HEIGHT: 63 IN | BODY MASS INDEX: 27.21 KG/M2 | HEART RATE: 73 BPM | TEMPERATURE: 97.7 F | RESPIRATION RATE: 16 BRPM

## 2025-03-06 VITALS
HEART RATE: 93 BPM | OXYGEN SATURATION: 98 % | RESPIRATION RATE: 16 BRPM | DIASTOLIC BLOOD PRESSURE: 97 MMHG | TEMPERATURE: 97.8 F | SYSTOLIC BLOOD PRESSURE: 133 MMHG

## 2025-03-06 DIAGNOSIS — R10.9 ABDOMINAL CRAMPING: Primary | ICD-10-CM

## 2025-03-06 DIAGNOSIS — G89.29 CHRONIC ABDOMINAL PAIN: Primary | ICD-10-CM

## 2025-03-06 DIAGNOSIS — R10.9 CHRONIC ABDOMINAL PAIN: Primary | ICD-10-CM

## 2025-03-06 PROCEDURE — 99283 EMERGENCY DEPT VISIT LOW MDM: CPT

## 2025-03-06 PROCEDURE — 6360000002 HC RX W HCPCS: Performed by: EMERGENCY MEDICINE

## 2025-03-06 PROCEDURE — 6370000000 HC RX 637 (ALT 250 FOR IP): Performed by: EMERGENCY MEDICINE

## 2025-03-06 PROCEDURE — 96372 THER/PROPH/DIAG INJ SC/IM: CPT

## 2025-03-06 PROCEDURE — 99284 EMERGENCY DEPT VISIT MOD MDM: CPT

## 2025-03-06 PROCEDURE — 99282 EMERGENCY DEPT VISIT SF MDM: CPT

## 2025-03-06 RX ORDER — DICYCLOMINE HYDROCHLORIDE 10 MG/ML
20 INJECTION INTRAMUSCULAR ONCE
Status: COMPLETED | OUTPATIENT
Start: 2025-03-06 | End: 2025-03-06

## 2025-03-06 RX ORDER — DICYCLOMINE HCL 20 MG
20 TABLET ORAL ONCE
Status: COMPLETED | OUTPATIENT
Start: 2025-03-06 | End: 2025-03-06

## 2025-03-06 RX ORDER — KETOROLAC TROMETHAMINE 15 MG/ML
15 INJECTION, SOLUTION INTRAMUSCULAR; INTRAVENOUS ONCE
Status: COMPLETED | OUTPATIENT
Start: 2025-03-06 | End: 2025-03-06

## 2025-03-06 RX ADMIN — DICYCLOMINE HYDROCHLORIDE 20 MG: 20 TABLET ORAL at 07:48

## 2025-03-06 RX ADMIN — DICYCLOMINE HYDROCHLORIDE 20 MG: 10 INJECTION, SOLUTION INTRAMUSCULAR at 18:39

## 2025-03-06 RX ADMIN — KETOROLAC TROMETHAMINE 15 MG: 15 INJECTION, SOLUTION INTRAMUSCULAR; INTRAVENOUS at 18:39

## 2025-03-06 ASSESSMENT — PAIN SCALES - GENERAL
PAINLEVEL_OUTOF10: 0
PAINLEVEL_OUTOF10: 0
PAINLEVEL_OUTOF10: 3
PAINLEVEL_OUTOF10: 10
PAINLEVEL_OUTOF10: 10

## 2025-03-06 ASSESSMENT — PAIN - FUNCTIONAL ASSESSMENT
PAIN_FUNCTIONAL_ASSESSMENT: 0-10

## 2025-03-06 ASSESSMENT — PAIN DESCRIPTION - LOCATION: LOCATION: ABDOMEN

## 2025-03-06 ASSESSMENT — PAIN DESCRIPTION - DESCRIPTORS: DESCRIPTORS: CRAMPING

## 2025-03-06 NOTE — ED PROVIDER NOTES
Emergency Department Encounter    Patient: Mario Orellana  MRN: [id number]  : 1990  Date of Evaluation: 3/6/2025  ED Provider:  Amy Lal DO    Triage Chief Complaint:   No chief complaint on file.    [place]:  Mario Orellana is a 34 y.o. male with history of OCD, irritable bowel, bipolar disorder acid reflux mental impairment depression chronic abdominal pain schizophrenia that presents to the emergency department via EMS for abdominal pain.  Patient states he is having some abdominal cramps and spasm and increased bowel movements.  Patient states he has been trying to cut back on fast food.  Patient states only thing that helps is a dental shot and Toradol shot.  Patient states the Bentyl pills are not helping.  Patient states no nausea or vomiting.  He states no diarrhea but increased soft stools.  Patient states he has not follow-up with a family doctor or gastroenterologist.  Patient states he currently works but not a lot.  Patient states he is [facility] living at the shelter.  Patient denies any chest pain shortness breath fever chills cough sore throat runny nose earache.  Patient here for evaluation.    ROS - see HPI, below listed is current ROS at time of my eval:  10 systems reviewed and negative except as above.     Past Medical History:   Diagnosis Date    Bipolar 1 disorder (HCC)     Chronic generalized abdominal pain     Depression     GERD (gastroesophageal reflux disease)     IBS (irritable bowel syndrome)     Mental impairment     OCD (obsessive compulsive disorder)     Schizo affective schizophrenia (HCC)      Past Surgical History:   Procedure Laterality Date    COLONOSCOPY      does not know    EGD      does not know     Family History   Problem Relation Age of Onset    Colon Cancer Neg Hx     Esophageal Cancer Neg Hx     Liver Cancer Neg Hx     Rectal Cancer Neg Hx     Stomach Cancer Neg Hx      Social History     Socioeconomic History    Marital status: Single     Spouse  (ADVIL;MOTRIN) 600 MG tablet Take 1 tablet by mouth 3 times daily as needed for Pain (Patient not taking: Reported on 1/14/2025) 30 tablet 0     Allergies   Allergen Reactions    Latex     Adhesive Tape     Codeine     Iodine     Shellfish-Derived Products        Nursing Notes Reviewed    Physical Exam:  Triage VS:    ED Triage Vitals [03/06/25 3396]   Encounter Vitals Group      BP (!) 133/97      Systolic BP Percentile       Diastolic BP Percentile       Pulse 93      Respirations 16      Temp 97.8 °F (36.6 °C)      Temp Source Oral      SpO2 98 %      Weight       Height       Head Circumference       Peak Flow       Pain Score       Pain Loc       Pain Education       Exclude from Growth Chart        My pulse ox interpretation is - normal    General appearance:  No acute distress.   Skin:  Warm. Dry.   Eye:  Extraocular movements intact.     Ears, nose, mouth and throat:  Oral mucosa moist   Neck:  Trachea midline.   Extremity:  No swelling.  Normal ROM     Heart:  Regular rate and rhythm, normal S1 & S2, no extra heart sounds.    Perfusion:  intact  Respiratory:  Lungs clear to auscultation bilaterally.  Respirations nonlabored.     Abdominal:  Normal bowel sounds.  Soft.  Nontender to palpation no rebound guarding or rigidity.  Non distended.  Back:  No CVA tenderness to palpation     Neurological:  Alert and oriented times 3.  No focal neuro deficits.             Psychiatric:  Appropriate    I have reviewed and interpreted all of the currently available lab results from this visit (if applicable):  No results found for this visit on 03/06/25.   Radiographs (if obtained):  Radiologist's Report Reviewed:  No results found.        MDM:  CC/HPI Summary, DDx, ED Course, and Reassessment:   Mario Orellana is a 34 y.o. male with history of OCD, irritable bowel, bipolar disorder acid reflux mental impairment depression chronic abdominal pain schizophrenia that presents to the emergency department via EMS for

## 2025-03-06 NOTE — ED PROVIDER NOTES
Emergency Department Encounter    Patient: Mario Orellana  MRN: [id number]  : 1990  Date of Evaluation: 3/6/2025  ED Provider:  Judit Chaparro MD    Triage Chief Complaint:   Abdominal Pain (Patient reports abdominal cramping and he doesn't have a brief on for incontinence.  )    [place]:  Mario Orellana is a 34 y.o. male that presents with complaint of abdominal cramping.  Reports the Bentyl and Toradol that he has been getting has helped.  He did have a little bit of diarrhea yesterday after he had Bentyl.  He denies pain.  He denies fever here.  He denies vomiting.  He had not eaten this morning and got the cramping.    ROS - see HPI, below listed is current ROS at time of my eval:  10 systems reviewed and negative except as above.     Past Medical History:   Diagnosis Date    Bipolar 1 disorder (HCC)     Chronic generalized abdominal pain     Depression     GERD (gastroesophageal reflux disease)     IBS (irritable bowel syndrome)     Mental impairment     OCD (obsessive compulsive disorder)     Schizo affective schizophrenia (HCC)      Past Surgical History:   Procedure Laterality Date    COLONOSCOPY      does not know    EGD      does not know     Family History   Problem Relation Age of Onset    Colon Cancer Neg Hx     Esophageal Cancer Neg Hx     Liver Cancer Neg Hx     Rectal Cancer Neg Hx     Stomach Cancer Neg Hx      Social History     Socioeconomic History    Marital status: Single     Spouse name: Not on file    Number of children: Not on file    Years of education: Not on file    Highest education level: Not on file   Occupational History    Not on file   Tobacco Use    Smoking status: Never    Smokeless tobacco: Never   Vaping Use    Vaping status: Never Used   Substance and Sexual Activity    Alcohol use: Never    Drug use: Never    Sexual activity: Not on file   Other Topics Concern    Not on file   Social History Narrative    Not on file     Social Determinants of Health     Financial

## 2025-03-06 NOTE — ED PROVIDER NOTES
[facility] EMERGENCY DEPARTMENT  EMERGENCY DEPARTMENT ENCOUNTER        Pt Name: Mario Orellana  MRN: [id number]  Birthdate 1990  Date of evaluation: 3/6/2025  Provider: Camden Barrientos PA-C  PCP: No primary care provider on file.      TALA. I have evaluated this patient.        CHIEF COMPLAINT      Chief Complaint   Patient presents with    Abdominal Pain     Patient states he was seen this morning and the medicine is now working.        HISTORY OF PRESENT ILLNESS:     History from : Patient    Limitations to history : None    Mario Orellana is a 34 y.o. male who presents with c/o persisting abdominal cramping.  He mentions he has had this episode in the past has had improvement with Toradol and Bentyl shots here in the emergency department.  Mentions he received oral Bentyl earlier this morning but states that it wore off.  He mentions he has not gotten any prescriptions from the pharmacy.  Discussion does not appear he has a primary care provider.  He is denying any vomiting, fever, confusion, SI.    Nursing Notes were all reviewed and agreed with or any disagreements were addressed in the HPI.    REVIEW OF SYSTEMS :     Review of Systems   All other systems reviewed and are negative.      Pertinent positives and negatives are stated within HPI    PAST HISTORY   has a past medical history of Bipolar 1 disorder (HCC), Chronic generalized abdominal pain, Depression, GERD (gastroesophageal reflux disease), IBS (irritable bowel syndrome), Mental impairment, OCD (obsessive compulsive disorder), and Schizo affective schizophrenia (HCC).    Past Surgical History:   Procedure Laterality Date    COLONOSCOPY      does not know    EGD      does not know       Family History   Problem Relation Age of Onset    Colon Cancer Neg Hx     Esophageal Cancer Neg Hx     Liver Cancer Neg Hx     Rectal Cancer Neg Hx     Stomach Cancer Neg Hx        Social History     Tobacco Use    Smoking status: Never  Motor Response: Obeys commands  Jose Coma Scale Score: 15              CIWA Assessment  BP: 130/82  Pulse: 73             PHYSICAL EXAM    ED Triage Vitals [03/06/25 1420]   BP Systolic BP Percentile Diastolic BP Percentile Temp Temp src Pulse Respirations SpO2   130/82 -- -- 97.7 °F (36.5 °C) -- (!) 103 16 100 %      Height Weight - Scale         1.6 m (5' 3\") 69.7 kg (153 lb 9.6 oz)             Physical Exam  Vitals and nursing note reviewed.   Constitutional:       Appearance: Normal appearance. He is well-developed. He is not ill-appearing or diaphoretic.      Comments: Patient ambulatory in the ED hallways in exam bed, sipping on beverage from Styrofoam cup   HENT:      Head: Normocephalic and atraumatic.   Eyes:      General: No scleral icterus.        Right eye: No discharge.         Left eye: No discharge.   Cardiovascular:      Rate and Rhythm: Normal rate and regular rhythm.      Heart sounds: Normal heart sounds. No murmur heard.     No friction rub. No gallop.   Pulmonary:      Effort: Pulmonary effort is normal. No respiratory distress.      Breath sounds: Normal breath sounds. No stridor. No wheezing or rales.   Chest:      Chest wall: No tenderness.   Abdominal:      General: Bowel sounds are normal. There is no distension.      Palpations: Abdomen is soft. There is no mass.      Tenderness: There is no abdominal tenderness. There is no guarding or rebound.   Musculoskeletal:         General: No tenderness. Normal range of motion.      Cervical back: Normal range of motion and neck supple.   Skin:     General: Skin is warm and dry.      Coloration: Skin is not pale.   Neurological:      Mental Status: He is alert and oriented to person, place, and time.      Coordination: Coordination normal.   Psychiatric:         Mood and Affect: Mood normal.         Behavior: Behavior normal.           DIAGNOSTIC RESULTS    LABS:    Labs Reviewed - No data to display    When ordered only abnormal lab results are

## 2025-03-06 NOTE — DISCHARGE INSTRUCTIONS
Establish a primary care physician Dr. Juan C Godwin calling morning for an appointment.  Establish a gastroenterologist with Dr. Orellana.  Call in the morning for an appointment  Return to the emergency department immediately any pain fever chills nausea vomiting dizzy lightheadedness or any worsening symptoms.

## 2025-03-06 NOTE — ED TRIAGE NOTES
Pt arrives via EMS. Medic reports pt left this ER and called EMS to go to [facility], then left [facility] and again called EMS from the [facility] on [address]. Pt ambulatory upon arrival and states, \"I am not going to wait this time\".

## 2025-03-06 NOTE — DISCHARGE INSTRUCTIONS
As we discussed, contact your primary care provider to schedule an appointment for reevaluation of your ongoing abdominal cramping and if needed any further testing.    I meanwhile you can continue to use the prescription Bentyl dicyclomine taking as described on the label.  If you need additional pain relief you could use acetaminophen.    Return with any uncontrollable vomiting, fever, worsening symptoms or any new concerns.       [facility], Dr list to establish a Family Physician for non-emergent medical needs.      [facility]   [phone]  [address] [place] [address]   Monday - Friday 8 am to 5pm  (All insurances or no insurance with sliding scale payment)     [facility]   [phone]  [address], [address] ([place] [facility])  Monday - Friday 8 am to 5 pm  (All insurances or no insurance with sliding scale payment)     [facility]  [phone] [place]uvian speaking staff  [address]   Monday - Friday 8 am to 8 pm (Medical)  Monday - Friday 8 am to 4:30 pm (Dental)  (All insurances or no insurance with sliding scale payment)      The [facility] and [facility] are available for hospital follow up appointments. Follow-up appointments are important for ongoing treatment and care to keep you well at home, instead of in the hospital.      Please also schedule a New Patient Appointment with the Primary Care Provider (PCP) of your choice. It normally takes a few weeks to get an appointment with a new PCP, so call TODAY. The PCP's listed below are all known to be accepting new patients.   If issues arise, call your health insurance for in network PCP options.  [place] PCP    [facility] Physician Finder/Scheduling   [phone]    [facility]  [phone]  [address], [address]   [place], [place] [address]  (All

## 2025-03-07 ENCOUNTER — HOSPITAL ENCOUNTER (EMERGENCY)
Age: 35
Discharge: HOME | End: 2025-03-07
Payer: MEDICARE

## 2025-03-07 ENCOUNTER — TELEPHONE (OUTPATIENT)
Dept: FAMILY MEDICINE CLINIC | Age: 35
End: 2025-03-07

## 2025-03-07 VITALS
OXYGEN SATURATION: 98 % | HEART RATE: 107 BPM | RESPIRATION RATE: 18 BRPM | TEMPERATURE: 98.1 F | DIASTOLIC BLOOD PRESSURE: 90 MMHG | SYSTOLIC BLOOD PRESSURE: 142 MMHG

## 2025-03-07 DIAGNOSIS — R10.9 ABDOMINAL PAIN, UNSPECIFIED ABDOMINAL LOCATION: Primary | ICD-10-CM

## 2025-03-07 PROCEDURE — 6360000002 HC RX W HCPCS: Performed by: PHYSICIAN ASSISTANT

## 2025-03-07 PROCEDURE — 99284 EMERGENCY DEPT VISIT MOD MDM: CPT

## 2025-03-07 PROCEDURE — 96372 THER/PROPH/DIAG INJ SC/IM: CPT

## 2025-03-07 RX ORDER — KETOROLAC TROMETHAMINE 15 MG/ML
30 INJECTION, SOLUTION INTRAMUSCULAR; INTRAVENOUS ONCE
Status: COMPLETED | OUTPATIENT
Start: 2025-03-07 | End: 2025-03-07

## 2025-03-07 RX ORDER — DICYCLOMINE HYDROCHLORIDE 10 MG/ML
20 INJECTION INTRAMUSCULAR ONCE
Status: COMPLETED | OUTPATIENT
Start: 2025-03-07 | End: 2025-03-07

## 2025-03-07 RX ADMIN — DICYCLOMINE HYDROCHLORIDE 20 MG: 10 INJECTION, SOLUTION INTRAMUSCULAR at 16:22

## 2025-03-07 RX ADMIN — KETOROLAC TROMETHAMINE 30 MG: 15 INJECTION, SOLUTION INTRAMUSCULAR; INTRAVENOUS at 16:22

## 2025-03-07 ASSESSMENT — PAIN - FUNCTIONAL ASSESSMENT: PAIN_FUNCTIONAL_ASSESSMENT: NONE - DENIES PAIN

## 2025-03-07 NOTE — TELEPHONE ENCOUNTER
----- Message from ROSE Fournier CNP sent at 3/7/2025  8:29 AM EST -----  Another referral from ED to establish care and for abdominal pain. Please schedule for appointment. Thank you.

## 2025-03-07 NOTE — ED PROVIDER NOTES
EMERGENCY DEPARTMENT ENCOUNTER        Pt Name: Mario Orellana  MRN: [id number]  Birthdate 1990  Date of evaluation: 3/7/2025  Provider: Marilee Rees PA-C  PCP: No primary care provider on file.    TALA. I have evaluated this patient.        Triage CHIEF COMPLAINT       Chief Complaint   Patient presents with    Abdominal Pain     \"For months\". Pt states he couldn't  his medicine yesterday because he doesn't have any money. Pt was seen yesterday for abd pain- looks like 2 or 3 times yesterday for same chronic abd pain.          HISTORY OF PRESENT ILLNESS      Chief Complaint: Abdominal pain    Mario Orellana is a 34 y.o. male who presents for abdominal pain.  Patient tells me that his abdomen is \"all inflated\" and has been all day.  He reports + nausea and vomiting.  Denies any diarrhea.       Nursing Notes were all reviewed and agreed with or any disagreements were addressed in the HPI.    REVIEW OF SYSTEMS     CONSTITUTIONAL:  Denies fever.  EYES:  Denies visual changes.  HEAD:  Denies headache.  ENT:  Denies earache, nasal congestion, sore throat.  NECK:  Denies neck pain.  RESPIRATORY:  Denies any shortness of breath.  CARDIOVASCULAR:  Denies chest pain.  GI:  + abd pain, n/v.     :  Denies urinary symptoms.  MUSCULOSKELETAL:  Denies extremity pain or swelling.  BACK:  Denies back pain.  INTEGUMENT:  Denies skin changes.  LYMPHATIC:  Denies lymphadenopathy.  NEUROLOGIC:  Denies any numbness/tingling.  PSYCHIATRIC:  Denies SI/HI.    PAST MEDICAL HISTORY     Past Medical History:   Diagnosis Date    Bipolar 1 disorder (HCC)     Chronic generalized abdominal pain     Depression     GERD (gastroesophageal reflux disease)     IBS (irritable bowel syndrome)     Mental impairment     OCD (obsessive compulsive disorder)     Schizo affective schizophrenia ([facility])        SURGICAL HISTORY     Past Surgical History:   Procedure Laterality Date    COLONOSCOPY      does not know    EGD      does not know

## 2025-03-07 NOTE — DISCHARGE INSTRUCTIONS
[facility]   [address]  [facility] [address]  [phone]  Marlene ROSE-CNP    The office has attempted to contact you for an appt., unable to reach you due to your phone not working.  Please follow up at the office, you can walk in to the office to schedule an appt.

## 2025-03-07 NOTE — CARE COORDINATION
CM review of pt chart for discharge plan pt is well known to ER for excessive visits many for abd pain. Pt abd pain is treated while in ER, pt always asked for a boxed lunch, once medically cleared pt is discharged.   CM visited pt today to give him the information for Marlene DUNCAN at [facility] along with the address and discussed where it is located. Pt verbalized understanding. Pt phone is not working as [facility] has tried to contact the pt. CM explained to pt where the office is located and that he can walk there on Monday to schedule an appt

## 2025-03-08 ENCOUNTER — APPOINTMENT (OUTPATIENT)
Dept: GENERAL RADIOLOGY | Age: 35
End: 2025-03-08
Payer: MEDICARE

## 2025-03-08 ENCOUNTER — HOSPITAL ENCOUNTER (EMERGENCY)
Age: 35
Discharge: HOME | End: 2025-03-08
Attending: STUDENT IN AN ORGANIZED HEALTH CARE EDUCATION/TRAINING PROGRAM
Payer: MEDICARE

## 2025-03-08 VITALS
DIASTOLIC BLOOD PRESSURE: 64 MMHG | SYSTOLIC BLOOD PRESSURE: 116 MMHG | RESPIRATION RATE: 14 BRPM | HEART RATE: 86 BPM | TEMPERATURE: 98 F | OXYGEN SATURATION: 98 %

## 2025-03-08 DIAGNOSIS — R07.89 ATYPICAL CHEST PAIN: ICD-10-CM

## 2025-03-08 DIAGNOSIS — R07.89 CHEST WALL PAIN: Primary | ICD-10-CM

## 2025-03-08 LAB
ALBUMIN SERPL-MCNC: 4.2 G/DL (ref 3.4–5)
ALBUMIN/GLOB SERPL: 1.6 {RATIO} (ref 1.1–2.2)
ALP SERPL-CCNC: 69 U/L (ref 40–129)
ALT SERPL-CCNC: 11 U/L (ref 10–40)
ANION GAP SERPL CALCULATED.3IONS-SCNC: 12 MMOL/L (ref 9–17)
AST SERPL-CCNC: 25 U/L (ref 15–37)
BASOPHILS # BLD: 0.02 K/UL
BASOPHILS NFR BLD: 0 % (ref 0–1)
BILIRUB SERPL-MCNC: 0.5 MG/DL (ref 0–1)
BUN SERPL-MCNC: 8 MG/DL (ref 7–20)
CALCIUM SERPL-MCNC: 9.2 MG/DL (ref 8.3–10.6)
CHLORIDE SERPL-SCNC: 104 MMOL/L (ref 99–110)
CO2 SERPL-SCNC: 26 MMOL/L (ref 21–32)
CREAT SERPL-MCNC: 0.9 MG/DL (ref 0.9–1.3)
EKG ATRIAL RATE: 90 BPM
EKG DIAGNOSIS: NORMAL
EKG P AXIS: 13 DEGREES
EKG P-R INTERVAL: 112 MS
EKG Q-T INTERVAL: 364 MS
EKG QRS DURATION: 92 MS
EKG QTC CALCULATION (BAZETT): 445 MS
EKG R AXIS: 9 DEGREES
EKG T AXIS: 19 DEGREES
EKG VENTRICULAR RATE: 90 BPM
EOSINOPHIL # BLD: 0.1 K/UL
EOSINOPHILS RELATIVE PERCENT: 2 % (ref 0–3)
ERYTHROCYTE [DISTWIDTH] IN BLOOD BY AUTOMATED COUNT: 12.1 % (ref 11.7–14.9)
GFR, ESTIMATED: >90 ML/MIN/1.73M2
GLUCOSE SERPL-MCNC: 103 MG/DL (ref 74–99)
HCT VFR BLD AUTO: 40.9 % (ref 42–52)
HGB BLD-MCNC: 13.1 G/DL (ref 13.5–18)
IMM GRANULOCYTES # BLD AUTO: 0.01 K/UL
IMM GRANULOCYTES NFR BLD: 0 %
LYMPHOCYTES NFR BLD: 0.97 K/UL
LYMPHOCYTES RELATIVE PERCENT: 17 % (ref 24–44)
MCH RBC QN AUTO: 30.5 PG (ref 27–31)
MCHC RBC AUTO-ENTMCNC: 32 G/DL (ref 32–36)
MCV RBC AUTO: 95.1 FL (ref 78–100)
MONOCYTES NFR BLD: 0.42 K/UL
MONOCYTES NFR BLD: 7 % (ref 0–4)
NEUTROPHILS NFR BLD: 73 % (ref 36–66)
NEUTS SEG NFR BLD: 4.12 K/UL
PLATELET # BLD AUTO: 207 K/UL (ref 140–440)
PMV BLD AUTO: 10 FL (ref 7.5–11.1)
POTASSIUM SERPL-SCNC: 3.7 MMOL/L (ref 3.5–5.1)
PROT SERPL-MCNC: 6.9 G/DL (ref 6.4–8.2)
RBC # BLD AUTO: 4.3 M/UL (ref 4.6–6.2)
SODIUM SERPL-SCNC: 142 MMOL/L (ref 136–145)
TROPONIN I SERPL HS-MCNC: 17 NG/L (ref 0–22)
WBC OTHER # BLD: 5.6 K/UL (ref 4–10.5)

## 2025-03-08 PROCEDURE — 2580000003 HC RX 258: Performed by: STUDENT IN AN ORGANIZED HEALTH CARE EDUCATION/TRAINING PROGRAM

## 2025-03-08 PROCEDURE — 93010 ELECTROCARDIOGRAM REPORT: CPT | Performed by: INTERNAL MEDICINE

## 2025-03-08 PROCEDURE — 99285 EMERGENCY DEPT VISIT HI MDM: CPT

## 2025-03-08 PROCEDURE — 84484 ASSAY OF TROPONIN QUANT: CPT

## 2025-03-08 PROCEDURE — 96374 THER/PROPH/DIAG INJ IV PUSH: CPT

## 2025-03-08 PROCEDURE — 80053 COMPREHEN METABOLIC PANEL: CPT

## 2025-03-08 PROCEDURE — 71045 X-RAY EXAM CHEST 1 VIEW: CPT

## 2025-03-08 PROCEDURE — 6360000002 HC RX W HCPCS: Performed by: STUDENT IN AN ORGANIZED HEALTH CARE EDUCATION/TRAINING PROGRAM

## 2025-03-08 PROCEDURE — 85025 COMPLETE CBC W/AUTO DIFF WBC: CPT

## 2025-03-08 PROCEDURE — 93005 ELECTROCARDIOGRAM TRACING: CPT | Performed by: STUDENT IN AN ORGANIZED HEALTH CARE EDUCATION/TRAINING PROGRAM

## 2025-03-08 RX ORDER — 0.9 % SODIUM CHLORIDE 0.9 %
500 INTRAVENOUS SOLUTION INTRAVENOUS ONCE
Status: COMPLETED | OUTPATIENT
Start: 2025-03-08 | End: 2025-03-08

## 2025-03-08 RX ORDER — KETOROLAC TROMETHAMINE 15 MG/ML
15 INJECTION, SOLUTION INTRAMUSCULAR; INTRAVENOUS ONCE
Status: COMPLETED | OUTPATIENT
Start: 2025-03-08 | End: 2025-03-08

## 2025-03-08 RX ADMIN — SODIUM CHLORIDE 500 ML: 9 INJECTION, SOLUTION INTRAVENOUS at 12:10

## 2025-03-08 RX ADMIN — KETOROLAC TROMETHAMINE 15 MG: 15 INJECTION, SOLUTION INTRAMUSCULAR; INTRAVENOUS at 12:10

## 2025-03-08 ASSESSMENT — PAIN SCALES - GENERAL
PAINLEVEL_OUTOF10: 7
PAINLEVEL_OUTOF10: 7
PAINLEVEL_OUTOF10: 5

## 2025-03-08 ASSESSMENT — PAIN DESCRIPTION - DESCRIPTORS: DESCRIPTORS: ACHING;DISCOMFORT

## 2025-03-08 ASSESSMENT — PAIN DESCRIPTION - LOCATION: LOCATION: CHEST

## 2025-03-08 ASSESSMENT — PAIN - FUNCTIONAL ASSESSMENT
PAIN_FUNCTIONAL_ASSESSMENT: 0-10
PAIN_FUNCTIONAL_ASSESSMENT: 0-10

## 2025-03-08 NOTE — CARE COORDINATION
CM has discussed with pt going to [facility] on Monday to make an appt for follow up as Marlene DUNCAN has tried to contact pt to schedule an appt.    Pt is at the [facility] and has food. When pt it out during the day they are aloud to take food from the refrigerator at the shelter to go with them.     CM did give pt a bag of food to take with him from the [facility] [employer] food bank. PT DOES NOT NEED TO BE FEED IN THE ER. CM also gave pt the schedule for daily food pantry schedule that pt can go to to continue to get needed food.     Pt verbalized understanding and was appreciative of the resources provided. YESSYRN/RITA

## 2025-03-08 NOTE — ED PROVIDER NOTES
Emergency Department Encounter    Patient: Mario Orellana  MRN: [id number]  : 1990  Date of Evaluation: 3/8/2025  ED Provider:  Haroldo Yi MD    Triage Chief Complaint:   Chest Pain    [place]:  Mario Orellana is a 34 y.o. male with history significant for bipolar 1 disorder, depression, GERD, IBS, schizoaffective disorder, that presents for chest pain.  The patient is recently seen in this emergency department for multiple complaints, and was seen yesterday for abdominal pain.    He endorses that since 2 days having chest pain, located to the midsternal area, nonradiating, described as moderate, intermittent, nonexertional, sharp, with no clear precipitating modifying factors his symptoms have not been associated with any shortness of breath, profuse diaphoresis, nausea or emesis, palpitations, lightheadedness, syncope.  No lower extremity edema, no tenderness in calves or rashes.  No fevers or chills.  No urinary abnormalities.  Reports multiple episodes of similar symptoms in the past.    ROS - see HPI, below listed is current ROS at time of my eval:  Systems reviewed and negative except as above.     Past Medical History:   Diagnosis Date    Bipolar 1 disorder (HCC)     Chronic generalized abdominal pain     Depression     GERD (gastroesophageal reflux disease)     IBS (irritable bowel syndrome)     Mental impairment     OCD (obsessive compulsive disorder)     Schizo affective schizophrenia (HCC)      Past Surgical History:   Procedure Laterality Date    COLONOSCOPY      does not know    EGD      does not know     Family History   Problem Relation Age of Onset    Colon Cancer Neg Hx     Esophageal Cancer Neg Hx     Liver Cancer Neg Hx     Rectal Cancer Neg Hx     Stomach Cancer Neg Hx      Social History     Socioeconomic History    Marital status: Single     Spouse name: Not on file    Number of children: Not on file    Years of education: Not on file    Highest education level: Not on  Important conditions considered include ***.     Work-up and interventions performed in the ED include:  Labs:   Imaging:   Consults:   Meds/interventions:     Pending work-up results will continue observation in the emergency department***.     Patient in good clinical conditions, at this time there is no need for further interventions in hospital, and the patient will be discharged. Insisted in adherence to medications, recommended follow-up as oupatient, and provided precautions to come back promptly if it becomes necessary.***      ED Course, and Reassessment:     ED Course as of 03/08/25 1032   Sat Mar 08, 2025   1008 EKG with a rate of 90, normal sinus rhythm, normal intervals, no ST segment or T wave abnormalities to suggest ischemia. [SC]      ED Course User Index  [SC] Haroldo Johnson MD         {History from (Optional):75815}    {Limitations to history (Optional):33856}    Patient was given the following medications:  Medications - No data to display    {Social Determinants (Optional):14648}    {Records Reviewed (Optional):96804}      Disposition:   Discharge condition: ***Stable    I am the Primary Clinician of Record.    Clinical Impression:  No diagnosis found.  Disposition referral (if applicable):  No follow-up provider specified.  Disposition medications (if applicable):  New Prescriptions    No medications on file     ED Provider Disposition Time  DISPOSITION                 Comment: Please note this report has been produced using speech recognition software and may contain errors related to that system including errors in grammar, punctuation, and spelling, as well as words and phrases that may be inappropriate.  Efforts were made to edit the dictations.

## 2025-03-08 NOTE — DISCHARGE INSTRUCTIONS
[facility]   [address]  [facility] [address]  [phone]  Marlene ROSE-CNP-They have been trying to get a hold of you by phone.    You can go over to the office on Monday to make an appointment.

## 2025-03-14 ENCOUNTER — HOSPITAL ENCOUNTER (EMERGENCY)
Age: 35
Discharge: HOME | End: 2025-03-14
Attending: EMERGENCY MEDICINE
Payer: MEDICARE

## 2025-03-14 VITALS
RESPIRATION RATE: 22 BRPM | HEART RATE: 99 BPM | DIASTOLIC BLOOD PRESSURE: 81 MMHG | TEMPERATURE: 98.1 F | SYSTOLIC BLOOD PRESSURE: 126 MMHG | OXYGEN SATURATION: 96 %

## 2025-03-14 DIAGNOSIS — R19.7 DIARRHEA, UNSPECIFIED TYPE: ICD-10-CM

## 2025-03-14 DIAGNOSIS — R11.0 NAUSEA: ICD-10-CM

## 2025-03-14 DIAGNOSIS — Z76.0 ENCOUNTER FOR MEDICATION REFILL: ICD-10-CM

## 2025-03-14 DIAGNOSIS — R20.8 RECTAL BURNING: ICD-10-CM

## 2025-03-14 DIAGNOSIS — R10.84 GENERALIZED ABDOMINAL PAIN: Primary | ICD-10-CM

## 2025-03-14 PROCEDURE — 96372 THER/PROPH/DIAG INJ SC/IM: CPT

## 2025-03-14 PROCEDURE — 6360000002 HC RX W HCPCS: Performed by: EMERGENCY MEDICINE

## 2025-03-14 PROCEDURE — 99284 EMERGENCY DEPT VISIT MOD MDM: CPT

## 2025-03-14 PROCEDURE — 6370000000 HC RX 637 (ALT 250 FOR IP): Performed by: EMERGENCY MEDICINE

## 2025-03-14 RX ORDER — DICYCLOMINE HYDROCHLORIDE 10 MG/ML
20 INJECTION INTRAMUSCULAR ONCE
Status: COMPLETED | OUTPATIENT
Start: 2025-03-14 | End: 2025-03-14

## 2025-03-14 RX ORDER — LOPERAMIDE HYDROCHLORIDE 2 MG/1
2 CAPSULE ORAL ONCE
Status: COMPLETED | OUTPATIENT
Start: 2025-03-14 | End: 2025-03-14

## 2025-03-14 RX ORDER — HYDROCORTISONE 25 MG/G
CREAM TOPICAL ONCE
Status: COMPLETED | OUTPATIENT
Start: 2025-03-14 | End: 2025-03-14

## 2025-03-14 RX ORDER — HYDROCORTISONE ACETATE 25 MG/1
25 SUPPOSITORY RECTAL EVERY 12 HOURS
Qty: 10 SUPPOSITORY | Refills: 0 | Status: SHIPPED | OUTPATIENT
Start: 2025-03-14 | End: 2025-03-19

## 2025-03-14 RX ORDER — DICYCLOMINE HYDROCHLORIDE 10 MG/1
10 CAPSULE ORAL 4 TIMES DAILY PRN
Qty: 28 CAPSULE | Refills: 0 | Status: SHIPPED | OUTPATIENT
Start: 2025-03-14 | End: 2025-03-21

## 2025-03-14 RX ORDER — LOPERAMIDE HYDROCHLORIDE 1 MG/5ML
1 SOLUTION ORAL 3 TIMES DAILY PRN
Qty: 118 ML | Refills: 0 | Status: SHIPPED | OUTPATIENT
Start: 2025-03-14 | End: 2025-03-21

## 2025-03-14 RX ORDER — ONDANSETRON 4 MG/1
4 TABLET, ORALLY DISINTEGRATING ORAL ONCE
Status: COMPLETED | OUTPATIENT
Start: 2025-03-14 | End: 2025-03-14

## 2025-03-14 RX ORDER — KETOROLAC TROMETHAMINE 15 MG/ML
30 INJECTION, SOLUTION INTRAMUSCULAR; INTRAVENOUS ONCE
Status: COMPLETED | OUTPATIENT
Start: 2025-03-14 | End: 2025-03-14

## 2025-03-14 RX ORDER — NAPROXEN 500 MG/1
500 TABLET ORAL 2 TIMES DAILY PRN
Qty: 60 TABLET | Refills: 0 | Status: SHIPPED | OUTPATIENT
Start: 2025-03-14

## 2025-03-14 RX ORDER — ONDANSETRON 4 MG/1
4 TABLET, ORALLY DISINTEGRATING ORAL 3 TIMES DAILY PRN
Qty: 21 TABLET | Refills: 0 | Status: SHIPPED | OUTPATIENT
Start: 2025-03-14

## 2025-03-14 RX ADMIN — KETOROLAC TROMETHAMINE 30 MG: 15 INJECTION, SOLUTION INTRAMUSCULAR; INTRAVENOUS at 16:15

## 2025-03-14 RX ADMIN — ONDANSETRON 4 MG: 4 TABLET, ORALLY DISINTEGRATING ORAL at 16:12

## 2025-03-14 RX ADMIN — HYDROCORTISONE 2.5%: 25 CREAM TOPICAL at 16:22

## 2025-03-14 RX ADMIN — DICYCLOMINE HYDROCHLORIDE 20 MG: 10 INJECTION, SOLUTION INTRAMUSCULAR at 16:15

## 2025-03-14 RX ADMIN — LOPERAMIDE HYDROCHLORIDE 2 MG: 2 CAPSULE ORAL at 16:12

## 2025-03-14 RX ADMIN — ALUMINUM HYDROXIDE, MAGNESIUM HYDROXIDE, AND SIMETHICONE: 1200; 120; 1200 SUSPENSION ORAL at 16:15

## 2025-03-14 NOTE — ED PROVIDER NOTES
[facility]    Emergency Department Encounter      Patient: Mario Orellana  MRN: [id number]  : 1990  Date of Evaluation: 3/14/2025  ED Provider:  Chelita Sharma DO    Triage Chief Complaint:   Abdominal Pain (Patient has a care management plan )    [place]:  Mario Orellana is a 34 y.o. male who  has a past medical history of Bipolar 1 disorder (HCC), Chronic generalized abdominal pain, Depression, GERD (gastroesophageal reflux disease), IBS (irritable bowel syndrome), Mental impairment, OCD (obsessive compulsive disorder), and Schizo affective schizophrenia (HCC). and presents with   Out of of bentyl at home and has to wait until his check comes in.  Would like zofran, toradol and bentyl. Also has diarrhea and rectal burning from wiping so much.  Would like a cream which he has had in the past for this.  Also requests mylanta.  He gets nauseated when he eats in front of other people.    ROS - see HPI, below listed is current ROS at time of my eval:   systems reviewed and negative except as above.     Past Medical History:   Diagnosis Date    Bipolar 1 disorder (HCC)     Chronic generalized abdominal pain     Depression     GERD (gastroesophageal reflux disease)     IBS (irritable bowel syndrome)     Mental impairment     OCD (obsessive compulsive disorder)     Schizo affective schizophrenia (HCC)      Past Surgical History:   Procedure Laterality Date    COLONOSCOPY      does not know    EGD      does not know     Family History   Problem Relation Age of Onset    Colon Cancer Neg Hx     Esophageal Cancer Neg Hx     Liver Cancer Neg Hx     Rectal Cancer Neg Hx     Stomach Cancer Neg Hx      Social History     Socioeconomic History    Marital status: Single     Spouse name: Not on file    Number of children: Not on file    Years of education: Not on file    Highest education level: Not on file   Occupational History    Not on file   Tobacco Use    Smoking status: Never    Smokeless

## 2025-03-15 ENCOUNTER — HOSPITAL ENCOUNTER (EMERGENCY)
Age: 35
Discharge: HOME | End: 2025-03-15
Payer: MEDICARE

## 2025-03-15 VITALS
DIASTOLIC BLOOD PRESSURE: 81 MMHG | SYSTOLIC BLOOD PRESSURE: 124 MMHG | OXYGEN SATURATION: 98 % | TEMPERATURE: 97.9 F | BODY MASS INDEX: 27.21 KG/M2 | HEIGHT: 63 IN | RESPIRATION RATE: 18 BRPM | HEART RATE: 104 BPM

## 2025-03-15 DIAGNOSIS — Z59.00 HOMELESSNESS: ICD-10-CM

## 2025-03-15 DIAGNOSIS — R10.9 ABDOMINAL PAIN, UNSPECIFIED ABDOMINAL LOCATION: Primary | ICD-10-CM

## 2025-03-15 PROCEDURE — 99283 EMERGENCY DEPT VISIT LOW MDM: CPT

## 2025-03-15 PROCEDURE — 6370000000 HC RX 637 (ALT 250 FOR IP): Performed by: NURSE PRACTITIONER

## 2025-03-15 RX ORDER — DICYCLOMINE HCL 20 MG
20 TABLET ORAL ONCE
Status: COMPLETED | OUTPATIENT
Start: 2025-03-15 | End: 2025-03-15

## 2025-03-15 RX ADMIN — LIDOCAINE HYDROCHLORIDE: 20 SOLUTION ORAL at 20:39

## 2025-03-15 RX ADMIN — DICYCLOMINE HYDROCHLORIDE 20 MG: 20 TABLET ORAL at 20:40

## 2025-03-15 SDOH — ECONOMIC STABILITY - HOUSING INSECURITY: HOMELESSNESS UNSPECIFIED: Z59.00

## 2025-03-15 ASSESSMENT — PAIN - FUNCTIONAL ASSESSMENT: PAIN_FUNCTIONAL_ASSESSMENT: 0-10

## 2025-03-15 ASSESSMENT — PAIN SCALES - GENERAL
PAINLEVEL_OUTOF10: 10
PAINLEVEL_OUTOF10: 10

## 2025-03-15 ASSESSMENT — PAIN DESCRIPTION - LOCATION: LOCATION: ABDOMEN;CHEST

## 2025-03-15 NOTE — ED TRIAGE NOTES
[facility] with staff for c/o abdominal and chest. Patient recently seen with c/o of abdominal pain.

## 2025-03-16 ENCOUNTER — HOSPITAL ENCOUNTER (EMERGENCY)
Age: 35
Discharge: HOME | End: 2025-03-16
Payer: MEDICARE

## 2025-03-16 VITALS
HEART RATE: 100 BPM | TEMPERATURE: 99 F | SYSTOLIC BLOOD PRESSURE: 129 MMHG | RESPIRATION RATE: 18 BRPM | DIASTOLIC BLOOD PRESSURE: 82 MMHG | OXYGEN SATURATION: 100 %

## 2025-03-16 DIAGNOSIS — R10.9 ABDOMINAL PAIN, UNSPECIFIED ABDOMINAL LOCATION: Primary | ICD-10-CM

## 2025-03-16 PROCEDURE — 6360000002 HC RX W HCPCS: Performed by: PHYSICIAN ASSISTANT

## 2025-03-16 PROCEDURE — 99284 EMERGENCY DEPT VISIT MOD MDM: CPT

## 2025-03-16 PROCEDURE — 6370000000 HC RX 637 (ALT 250 FOR IP): Performed by: PHYSICIAN ASSISTANT

## 2025-03-16 PROCEDURE — 96372 THER/PROPH/DIAG INJ SC/IM: CPT

## 2025-03-16 RX ORDER — DICYCLOMINE HYDROCHLORIDE 10 MG/ML
20 INJECTION INTRAMUSCULAR ONCE
Status: COMPLETED | OUTPATIENT
Start: 2025-03-16 | End: 2025-03-16

## 2025-03-16 RX ORDER — ONDANSETRON 4 MG/1
4 TABLET, ORALLY DISINTEGRATING ORAL ONCE
Status: COMPLETED | OUTPATIENT
Start: 2025-03-16 | End: 2025-03-16

## 2025-03-16 RX ORDER — KETOROLAC TROMETHAMINE 15 MG/ML
30 INJECTION, SOLUTION INTRAMUSCULAR; INTRAVENOUS ONCE
Status: COMPLETED | OUTPATIENT
Start: 2025-03-16 | End: 2025-03-16

## 2025-03-16 RX ADMIN — KETOROLAC TROMETHAMINE 30 MG: 15 INJECTION, SOLUTION INTRAMUSCULAR; INTRAVENOUS at 17:01

## 2025-03-16 RX ADMIN — ONDANSETRON 4 MG: 4 TABLET, ORALLY DISINTEGRATING ORAL at 17:01

## 2025-03-16 RX ADMIN — DICYCLOMINE HYDROCHLORIDE 20 MG: 10 INJECTION, SOLUTION INTRAMUSCULAR at 17:02

## 2025-03-16 ASSESSMENT — PAIN DESCRIPTION - LOCATION
LOCATION: ABDOMEN
LOCATION: ABDOMEN

## 2025-03-16 ASSESSMENT — PAIN SCALES - GENERAL
PAINLEVEL_OUTOF10: 10
PAINLEVEL_OUTOF10: 8

## 2025-03-16 ASSESSMENT — PAIN - FUNCTIONAL ASSESSMENT: PAIN_FUNCTIONAL_ASSESSMENT: 0-10

## 2025-03-16 ASSESSMENT — PAIN DESCRIPTION - PAIN TYPE: TYPE: ACUTE PAIN

## 2025-03-16 ASSESSMENT — PAIN DESCRIPTION - DESCRIPTORS
DESCRIPTORS: CRAMPING
DESCRIPTORS: CRAMPING

## 2025-03-16 NOTE — ED PROVIDER NOTES
He does have chronic GERD.  He was given a dose of Bentyl here which she reports always helps him as well as a GI cocktail.  In addition the patient was requesting transfer to mental health facility for inpatient admission because he felt this would be more expeditious to get his medication adjusted and would be better for him than staying in a shelter.  At this time he is not suicidal, homicidal, paranoid or hallucinating and he does not meet criteria for inpatient mental health stay.  He was advised that he needs to follow-up with mental health as an outpatient.  I did consult case management as he advised that he is having difficulty getting along with some residents at the homeless shelter here in [place] and having difficulty sleeping and would like to be transferred somewhere else.  Unfortunately they were not able to arrange transport for the patient to [place] this evening and while waiting the patient started getting more agitated and shouting in the lobby and was therefore escorted out by security.    I am the Primary Clinician of Record.    Condition on Discharge: Stable     CLINICAL IMPRESSION      1. Abdominal pain, unspecified abdominal location    2. Homelessness          DISPOSITION/PLAN   DISPOSITION Decision To Discharge 03/15/2025 09:13:23 PM   DISPOSITION CONDITION Stable           PATIENT REFERREDTO:  PCP    Schedule an appointment as soon as possible for a visit         DISCHARGE MEDICATIONS:  Discharge Medication List as of 3/15/2025  9:24 PM          DISCONTINUED MEDICATIONS:  Discharge Medication List as of 3/15/2025  9:24 PM                 (Please note that portions ofthis note were completed with a voice recognition program.  Efforts were made to edit the dictations but occasionally words are mis-transcribed.)    ROSE BRICENO CNP (electronically signed)             Marlene James APRN - CNP  03/15/25 6406

## 2025-03-16 NOTE — CARE COORDINATION
Recommended artificial tears to use: 1 drop 4x a day in both eyes. Right after dealing with a ride that did not work well. Patient walks in and said that he needed case management -- (still have 6 other consults waiting) -- and that he was here yesterday but doctor wouldn't listen to him and that he needs his psych meds and a ride  home. CM pointed to wall towards the phone b/c he said that he had no phone pointed to his insurance paperwork and said that he can call his insurance once he is discharged. Patient started going on and on about that he has to give them a 2 day notice if he calls them. CM told him to let them know that he is getting discharged from a hospital and that they will pick him up. Patient then starts yelling Fuck across the room as if he was speaking to someone else and arguing with an invisible person.. CM told  to have  him out if he does not stop cussing. Edmundo then started arguing with CM again and CM told Edmundo that she would not argue with him and left area.

## 2025-03-16 NOTE — ED PROVIDER NOTES
EMERGENCY DEPARTMENT ENCOUNTER        Pt Name: Mario Orellana  MRN: [id number]  Birthdate 1990  Date of evaluation: 3/16/2025  Provider: Marilee Rees PA-C  PCP: No primary care provider on file.    TALA. I have evaluated this patient.        Triage CHIEF COMPLAINT       Chief Complaint   Patient presents with    Abdominal Pain     \"I've been cramping for weeks\".         HISTORY OF PRESENT ILLNESS      Chief Complaint: Abdominal cramping    Mario Orellana is a 34 y.o. male who presents for abdominal cramping.  Patient is eating a boxed lunch as he tells me that he has abdominal cramping when he eats sandwiches or drinks water too fast.  Denies n/v/d.  Insists that it is not anything he is doing to cause the pain.      Nursing Notes were all reviewed and agreed with or any disagreements were addressed in the HPI.    REVIEW OF SYSTEMS     CONSTITUTIONAL:  Denies fever.  EYES:  Denies visual changes.  HEAD:  Denies headache.  ENT:  Denies earache, nasal congestion, sore throat.  NECK:  Denies neck pain.  RESPIRATORY:  Denies any shortness of breath.  CARDIOVASCULAR:  Denies chest pain.  GI:  Denies nausea or vomiting.  + abd pain.  :  Denies urinary symptoms.  MUSCULOSKELETAL:  Denies extremity pain or swelling.  BACK:  Denies back pain.  INTEGUMENT:  Denies skin changes.  LYMPHATIC:  Denies lymphadenopathy.  NEUROLOGIC:  Denies any numbness/tingling.  PSYCHIATRIC:  Denies SI/HI.    PAST MEDICAL HISTORY     Past Medical History:   Diagnosis Date    Bipolar 1 disorder (HCC)     Chronic generalized abdominal pain     Depression     GERD (gastroesophageal reflux disease)     IBS (irritable bowel syndrome)     Mental impairment     OCD (obsessive compulsive disorder)     Schizo affective schizophrenia (HCC)        SURGICAL HISTORY     Past Surgical History:   Procedure Laterality Date    COLONOSCOPY      does not know    EGD      does not know       CURRENTMEDICATIONS       Previous Medications

## 2025-03-16 NOTE — ED TRIAGE NOTES
Patient presents with c/o abdominal cramping. Symptoms began weeks ago. Patient adds he is not receiving enough food at the shelter. Refused food at this time. Also states he goes to the bathroom too much and is losing weight. Patient seen in this ED last night for same complaint.

## 2025-03-27 ENCOUNTER — HOSPITAL ENCOUNTER (EMERGENCY)
Age: 35
Discharge: HOME | End: 2025-03-27
Payer: MEDICARE

## 2025-03-27 VITALS
HEART RATE: 103 BPM | SYSTOLIC BLOOD PRESSURE: 117 MMHG | HEIGHT: 63 IN | BODY MASS INDEX: 27.11 KG/M2 | WEIGHT: 153 LBS | OXYGEN SATURATION: 100 % | DIASTOLIC BLOOD PRESSURE: 79 MMHG | TEMPERATURE: 98.2 F | RESPIRATION RATE: 16 BRPM

## 2025-03-27 DIAGNOSIS — R00.0 TACHYCARDIA: ICD-10-CM

## 2025-03-27 DIAGNOSIS — R10.9 ABDOMINAL PAIN, UNSPECIFIED ABDOMINAL LOCATION: Primary | ICD-10-CM

## 2025-03-27 LAB
ALBUMIN SERPL-MCNC: 4.7 G/DL (ref 3.4–5)
ALBUMIN/GLOB SERPL: 1.8 {RATIO} (ref 1.1–2.2)
ALP SERPL-CCNC: 75 U/L (ref 40–129)
ALT SERPL-CCNC: 16 U/L (ref 10–40)
AMPHET UR QL SCN: NEGATIVE
ANION GAP SERPL CALCULATED.3IONS-SCNC: 12 MMOL/L (ref 9–17)
AST SERPL-CCNC: 19 U/L (ref 15–37)
BARBITURATES UR QL SCN: NEGATIVE
BASOPHILS # BLD: 0.01 K/UL
BASOPHILS NFR BLD: 0 % (ref 0–1)
BENZODIAZ UR QL: POSITIVE
BILIRUB SERPL-MCNC: <0.2 MG/DL (ref 0–1)
BUN SERPL-MCNC: 12 MG/DL (ref 7–20)
CALCIUM SERPL-MCNC: 9.7 MG/DL (ref 8.3–10.6)
CANNABINOIDS UR QL SCN: NEGATIVE
CHLORIDE SERPL-SCNC: 99 MMOL/L (ref 99–110)
CO2 SERPL-SCNC: 31 MMOL/L (ref 21–32)
COCAINE UR QL SCN: NEGATIVE
CREAT SERPL-MCNC: 1 MG/DL (ref 0.9–1.3)
EOSINOPHIL # BLD: 0.08 K/UL
EOSINOPHILS RELATIVE PERCENT: 1 % (ref 0–3)
ERYTHROCYTE [DISTWIDTH] IN BLOOD BY AUTOMATED COUNT: 11.9 % (ref 11.7–14.9)
FENTANYL UR QL: NEGATIVE
GFR, ESTIMATED: 84 ML/MIN/1.73M2
GLUCOSE SERPL-MCNC: 107 MG/DL (ref 74–99)
HCT VFR BLD AUTO: 37.3 % (ref 42–52)
HGB BLD-MCNC: 12.4 G/DL (ref 13.5–18)
IMM GRANULOCYTES # BLD AUTO: 0.03 K/UL
IMM GRANULOCYTES NFR BLD: 0 %
LIPASE SERPL-CCNC: 61 U/L (ref 13–60)
LYMPHOCYTES NFR BLD: 1.07 K/UL
LYMPHOCYTES RELATIVE PERCENT: 13 % (ref 24–44)
MCH RBC QN AUTO: 31.2 PG (ref 27–31)
MCHC RBC AUTO-ENTMCNC: 33.2 G/DL (ref 32–36)
MCV RBC AUTO: 94 FL (ref 78–100)
MONOCYTES NFR BLD: 0.65 K/UL
MONOCYTES NFR BLD: 8 % (ref 0–4)
NEUTROPHILS NFR BLD: 77 % (ref 36–66)
NEUTS SEG NFR BLD: 6.32 K/UL
OPIATES UR QL SCN: NEGATIVE
OXYCODONE UR QL SCN: NEGATIVE
PLATELET # BLD AUTO: 206 K/UL (ref 140–440)
PMV BLD AUTO: 9.4 FL (ref 7.5–11.1)
POTASSIUM SERPL-SCNC: 4 MMOL/L (ref 3.5–5.1)
PROT SERPL-MCNC: 7.4 G/DL (ref 6.4–8.2)
RBC # BLD AUTO: 3.97 M/UL (ref 4.6–6.2)
SODIUM SERPL-SCNC: 141 MMOL/L (ref 136–145)
TEST INFORMATION: ABNORMAL
WBC OTHER # BLD: 8.2 K/UL (ref 4–10.5)

## 2025-03-27 PROCEDURE — 80307 DRUG TEST PRSMV CHEM ANLYZR: CPT

## 2025-03-27 PROCEDURE — 85025 COMPLETE CBC W/AUTO DIFF WBC: CPT

## 2025-03-27 PROCEDURE — 83690 ASSAY OF LIPASE: CPT

## 2025-03-27 PROCEDURE — 96374 THER/PROPH/DIAG INJ IV PUSH: CPT

## 2025-03-27 PROCEDURE — 2580000003 HC RX 258: Performed by: NURSE PRACTITIONER

## 2025-03-27 PROCEDURE — 6360000002 HC RX W HCPCS: Performed by: NURSE PRACTITIONER

## 2025-03-27 PROCEDURE — 99284 EMERGENCY DEPT VISIT MOD MDM: CPT

## 2025-03-27 PROCEDURE — 96372 THER/PROPH/DIAG INJ SC/IM: CPT

## 2025-03-27 PROCEDURE — 80053 COMPREHEN METABOLIC PANEL: CPT

## 2025-03-27 RX ORDER — DICYCLOMINE HYDROCHLORIDE 10 MG/ML
10 INJECTION INTRAMUSCULAR ONCE
Status: COMPLETED | OUTPATIENT
Start: 2025-03-27 | End: 2025-03-27

## 2025-03-27 RX ORDER — KETOROLAC TROMETHAMINE 15 MG/ML
15 INJECTION, SOLUTION INTRAMUSCULAR; INTRAVENOUS ONCE
Status: COMPLETED | OUTPATIENT
Start: 2025-03-27 | End: 2025-03-27

## 2025-03-27 RX ORDER — 0.9 % SODIUM CHLORIDE 0.9 %
1000 INTRAVENOUS SOLUTION INTRAVENOUS ONCE
Status: COMPLETED | OUTPATIENT
Start: 2025-03-27 | End: 2025-03-27

## 2025-03-27 RX ADMIN — KETOROLAC TROMETHAMINE 15 MG: 15 INJECTION, SOLUTION INTRAMUSCULAR; INTRAVENOUS at 17:00

## 2025-03-27 RX ADMIN — SODIUM CHLORIDE 1000 ML: 0.9 INJECTION, SOLUTION INTRAVENOUS at 17:00

## 2025-03-27 RX ADMIN — DICYCLOMINE HYDROCHLORIDE 10 MG: 10 INJECTION, SOLUTION INTRAMUSCULAR at 16:57

## 2025-03-27 ASSESSMENT — PAIN DESCRIPTION - LOCATION
LOCATION: ABDOMEN

## 2025-03-27 ASSESSMENT — ENCOUNTER SYMPTOMS
VOMITING: 1
COUGH: 0
CONSTIPATION: 0
SHORTNESS OF BREATH: 0
ABDOMINAL PAIN: 1
CHEST TIGHTNESS: 0
DIARRHEA: 0
NAUSEA: 0

## 2025-03-27 ASSESSMENT — PAIN SCALES - GENERAL
PAINLEVEL_OUTOF10: 10
PAINLEVEL_OUTOF10: 10
PAINLEVEL_OUTOF10: 7

## 2025-03-27 ASSESSMENT — PAIN - FUNCTIONAL ASSESSMENT: PAIN_FUNCTIONAL_ASSESSMENT: 0-10

## 2025-03-27 ASSESSMENT — PAIN DESCRIPTION - DESCRIPTORS
DESCRIPTORS: CRAMPING

## 2025-03-27 NOTE — CARE COORDINATION
Rick from pharmacy called this CM and asked if patient has a Medicaid application on file. CM explained that we no longer have anyone to sign patients' up for Medicaid, but CM can give patient an application along with a list of documents needed for Medicaid with [facility] address and phone number. Patient does show that he has Medicare, but pharmacy it must have termed as they cannot get any of patients' Medicare to work.

## 2025-03-27 NOTE — ED PROVIDER NOTES
Emergency Department Encounter  Location: [facility] EMERGENCY DEPARTMENT    Patient: Mario Orellana  MRN: [id number]  : 1990  Date of evaluation: 3/27/2025  ED Provider: Apolinar Lew PA-C   [id number]  Mario Orellana was checked out to me by ELEAZAR francis . Please see his/her initial documentation for details of the patient's initial ED presentation, physical exam and completed studies.    In brief, Mario Orellana is a 34 y.o. male that presented to the emergency department for abdominal pain.  This is necrotic in quality.  Patient states is the same abdominal pain he typically has.  He had a single episode of vomiting this morning pt did eat a large volume of food PTA and kept it down. Pt tachycardic on arrival awaiting blood work.     I have reviewed and interpreted all of the currently available lab results and diagnostics from this visit:  Results for orders placed or performed during the hospital encounter of 25   CBC with Auto Differential   Result Value Ref Range    WBC 8.2 4.0 - 10.5 k/uL    RBC 3.97 (L) 4.60 - 6.20 m/uL    Hemoglobin 12.4 (L) 13.5 - 18.0 g/dL    Hematocrit 37.3 (L) 42.0 - 52.0 %    MCV 94.0 78.0 - 100.0 fL    MCH 31.2 (H) 27.0 - 31.0 pg    MCHC 33.2 32.0 - 36.0 g/dL    RDW 11.9 11.7 - 14.9 %    Platelets 206 140 - 440 k/uL    MPV 9.4 7.5 - 11.1 fL    Neutrophils % 77 (H) 36 - 66 %    Lymphocytes % 13 (L) 24 - 44 %    Monocytes % 8 (H) 0 - 4 %    Eosinophils % 1 0.0 - 3.0 %    Basophils % 0 0 - 1 %    Immature Granulocytes % 0 0 %    Neutrophils Absolute 6.32 k/uL    Lymphocytes Absolute 1.07 k/uL    Monocytes Absolute 0.65 k/uL    Eosinophils Absolute 0.08 k/uL    Basophils Absolute 0.01 k/uL    Immature Granulocytes Absolute 0.03 k/uL     No results found.    Final ED Course and MDM:  In brief, Mario Orellana is a 34 y.o. male whose care was signed out to me by the outgoing provider. In brief, ***    ED Medication Orders (From admission, onward)      Start

## 2025-03-27 NOTE — ED PROVIDER NOTES
[facility] EMERGENCY DEPARTMENT  EMERGENCY DEPARTMENT ENCOUNTER      Pt Name: Mario Orellana  MRN: [id number]  Birthdate 1990  Date of evaluation: 3/27/2025  Provider: ROSE Ward - CNP  PCP: No primary care provider on file.  Note Started: 4:07 PM EDT 3/27/25    I am the Primary Clinician of Record.  TALA. I have evaluated this patient.      CHIEF COMPLAINT       Chief Complaint   Patient presents with    Abdominal Pain     HISTORY OF PRESENT ILLNESS: 1 or more Elements   History from : Patient        Mario Orellana is a 34 y.o. male who presents to the emergency department with chronic abd pain and cramping. Onset this morning.  Normal BM today, No blood. No dysuria. Reports he vomited once this morning.  He ate donuts, granola bar and lemonade a short time ago.  No vomiting of those.      Nursing Notes were all reviewed and agreed with or any disagreements were addressed in the HPI.    REVIEW OF SYSTEMS :    Review of Systems   Constitutional:  Negative for fatigue and fever.   Respiratory:  Negative for cough, chest tightness and shortness of breath.    Cardiovascular:  Negative for chest pain.   Gastrointestinal:  Positive for abdominal pain (\"all over\") and vomiting (1 episode of vomiting this morning.). Negative for constipation, diarrhea and nausea.     Positives and Pertinent negatives as per HPI.     SURGICAL HISTORY     Past Surgical History:   Procedure Laterality Date    COLONOSCOPY      does not know    EGD      does not know       CURRENTMEDICATIONS       Previous Medications    ACETAMINOPHEN (TYLENOL) 500 MG TABLET    Take 1 tablet by mouth 4 times daily as needed for Pain    CALCIUM CARBONATE (ANTACID) 500 MG CHEWABLE TABLET    Take 1 tablet by mouth daily    DICYCLOMINE (BENTYL) 10 MG CAPSULE    Take 1 capsule by mouth 4 times daily as needed (cramping abominal pain)    DOCUSATE SODIUM (COLACE) 100 MG CAPSULE    Take 1 capsule by mouth 2 times daily

## 2025-03-28 ENCOUNTER — HOSPITAL ENCOUNTER (EMERGENCY)
Age: 35
Discharge: HOME | End: 2025-03-28
Payer: MEDICARE

## 2025-03-28 ENCOUNTER — APPOINTMENT (OUTPATIENT)
Dept: GENERAL RADIOLOGY | Age: 35
End: 2025-03-28
Payer: MEDICARE

## 2025-03-28 ENCOUNTER — HOSPITAL ENCOUNTER (EMERGENCY)
Age: 35
Discharge: HOME | End: 2025-03-28
Attending: EMERGENCY MEDICINE
Payer: MEDICARE

## 2025-03-28 VITALS
HEIGHT: 63 IN | DIASTOLIC BLOOD PRESSURE: 75 MMHG | OXYGEN SATURATION: 99 % | BODY MASS INDEX: 25.16 KG/M2 | SYSTOLIC BLOOD PRESSURE: 120 MMHG | HEART RATE: 98 BPM | TEMPERATURE: 97.7 F | WEIGHT: 142 LBS | RESPIRATION RATE: 20 BRPM

## 2025-03-28 VITALS
SYSTOLIC BLOOD PRESSURE: 124 MMHG | HEART RATE: 95 BPM | OXYGEN SATURATION: 100 % | TEMPERATURE: 97.6 F | WEIGHT: 142 LBS | BODY MASS INDEX: 25.16 KG/M2 | DIASTOLIC BLOOD PRESSURE: 82 MMHG | RESPIRATION RATE: 16 BRPM | HEIGHT: 63 IN

## 2025-03-28 DIAGNOSIS — G89.29 CHRONIC ABDOMINAL PAIN: Primary | ICD-10-CM

## 2025-03-28 DIAGNOSIS — R07.9 CHEST PAIN, UNSPECIFIED TYPE: ICD-10-CM

## 2025-03-28 DIAGNOSIS — R10.9 CHRONIC ABDOMINAL PAIN: Primary | ICD-10-CM

## 2025-03-28 DIAGNOSIS — Z59.00 HOMELESSNESS: ICD-10-CM

## 2025-03-28 DIAGNOSIS — F25.9 SCHIZOAFFECTIVE DISORDER, UNSPECIFIED TYPE: ICD-10-CM

## 2025-03-28 LAB
ALBUMIN SERPL-MCNC: 4.5 G/DL (ref 3.4–5)
ALBUMIN/GLOB SERPL: 1.7 {RATIO} (ref 1.1–2.2)
ALP SERPL-CCNC: 73 U/L (ref 40–129)
ALT SERPL-CCNC: 16 U/L (ref 10–40)
ANION GAP SERPL CALCULATED.3IONS-SCNC: 10 MMOL/L (ref 9–17)
AST SERPL-CCNC: 22 U/L (ref 15–37)
BASOPHILS # BLD: 0.01 K/UL
BASOPHILS NFR BLD: 0 % (ref 0–1)
BILIRUB SERPL-MCNC: 0.3 MG/DL (ref 0–1)
BUN SERPL-MCNC: 10 MG/DL (ref 7–20)
CALCIUM SERPL-MCNC: 9.6 MG/DL (ref 8.3–10.6)
CHLORIDE SERPL-SCNC: 102 MMOL/L (ref 99–110)
CO2 SERPL-SCNC: 31 MMOL/L (ref 21–32)
CREAT SERPL-MCNC: 1 MG/DL (ref 0.9–1.3)
EKG ATRIAL RATE: 123 BPM
EKG ATRIAL RATE: 94 BPM
EKG DIAGNOSIS: NORMAL
EKG DIAGNOSIS: NORMAL
EKG P AXIS: 32 DEGREES
EKG P AXIS: 40 DEGREES
EKG P-R INTERVAL: 124 MS
EKG P-R INTERVAL: 128 MS
EKG Q-T INTERVAL: 316 MS
EKG Q-T INTERVAL: 350 MS
EKG QRS DURATION: 84 MS
EKG QRS DURATION: 84 MS
EKG QTC CALCULATION (BAZETT): 437 MS
EKG QTC CALCULATION (BAZETT): 452 MS
EKG R AXIS: 11 DEGREES
EKG R AXIS: 7 DEGREES
EKG T AXIS: 17 DEGREES
EKG T AXIS: 8 DEGREES
EKG VENTRICULAR RATE: 123 BPM
EKG VENTRICULAR RATE: 94 BPM
EOSINOPHIL # BLD: 0.13 K/UL
EOSINOPHILS RELATIVE PERCENT: 2 % (ref 0–3)
ERYTHROCYTE [DISTWIDTH] IN BLOOD BY AUTOMATED COUNT: 12.1 % (ref 11.7–14.9)
GFR, ESTIMATED: 85 ML/MIN/1.73M2
GLUCOSE SERPL-MCNC: 103 MG/DL (ref 74–99)
HCT VFR BLD AUTO: 43.6 % (ref 42–52)
HGB BLD-MCNC: 14.4 G/DL (ref 13.5–18)
IMM GRANULOCYTES # BLD AUTO: 0.04 K/UL
IMM GRANULOCYTES NFR BLD: 1 %
LYMPHOCYTES NFR BLD: 0.97 K/UL
LYMPHOCYTES RELATIVE PERCENT: 16 % (ref 24–44)
MCH RBC QN AUTO: 31.1 PG (ref 27–31)
MCHC RBC AUTO-ENTMCNC: 33 G/DL (ref 32–36)
MCV RBC AUTO: 94.2 FL (ref 78–100)
MONOCYTES NFR BLD: 0.51 K/UL
MONOCYTES NFR BLD: 9 % (ref 0–4)
NEUTROPHILS NFR BLD: 72 % (ref 36–66)
NEUTS SEG NFR BLD: 4.29 K/UL
PLATELET # BLD AUTO: 223 K/UL (ref 140–440)
PMV BLD AUTO: 9.3 FL (ref 7.5–11.1)
POTASSIUM SERPL-SCNC: 4.1 MMOL/L (ref 3.5–5.1)
PROT SERPL-MCNC: 7 G/DL (ref 6.4–8.2)
RBC # BLD AUTO: 4.63 M/UL (ref 4.6–6.2)
SODIUM SERPL-SCNC: 142 MMOL/L (ref 136–145)
TROPONIN I SERPL HS-MCNC: 22 NG/L (ref 0–22)
TROPONIN I SERPL HS-MCNC: 24 NG/L (ref 0–22)
WBC OTHER # BLD: 6 K/UL (ref 4–10.5)

## 2025-03-28 PROCEDURE — 6360000002 HC RX W HCPCS: Performed by: PHYSICIAN ASSISTANT

## 2025-03-28 PROCEDURE — 99285 EMERGENCY DEPT VISIT HI MDM: CPT

## 2025-03-28 PROCEDURE — 93005 ELECTROCARDIOGRAM TRACING: CPT | Performed by: EMERGENCY MEDICINE

## 2025-03-28 PROCEDURE — 84484 ASSAY OF TROPONIN QUANT: CPT

## 2025-03-28 PROCEDURE — 85025 COMPLETE CBC W/AUTO DIFF WBC: CPT

## 2025-03-28 PROCEDURE — 80053 COMPREHEN METABOLIC PANEL: CPT

## 2025-03-28 PROCEDURE — 96372 THER/PROPH/DIAG INJ SC/IM: CPT

## 2025-03-28 PROCEDURE — 71045 X-RAY EXAM CHEST 1 VIEW: CPT

## 2025-03-28 PROCEDURE — 99284 EMERGENCY DEPT VISIT MOD MDM: CPT

## 2025-03-28 PROCEDURE — 6370000000 HC RX 637 (ALT 250 FOR IP): Performed by: PHYSICIAN ASSISTANT

## 2025-03-28 PROCEDURE — 93010 ELECTROCARDIOGRAM REPORT: CPT | Performed by: INTERNAL MEDICINE

## 2025-03-28 PROCEDURE — 6370000000 HC RX 637 (ALT 250 FOR IP): Performed by: EMERGENCY MEDICINE

## 2025-03-28 RX ORDER — DICYCLOMINE HCL 20 MG
20 TABLET ORAL ONCE
Status: COMPLETED | OUTPATIENT
Start: 2025-03-28 | End: 2025-03-28

## 2025-03-28 RX ORDER — DICYCLOMINE HYDROCHLORIDE 10 MG/ML
20 INJECTION INTRAMUSCULAR ONCE
Status: COMPLETED | OUTPATIENT
Start: 2025-03-28 | End: 2025-03-28

## 2025-03-28 RX ORDER — ASPIRIN 81 MG/1
324 TABLET, CHEWABLE ORAL ONCE
Status: COMPLETED | OUTPATIENT
Start: 2025-03-28 | End: 2025-03-28

## 2025-03-28 RX ADMIN — LIDOCAINE HYDROCHLORIDE: 20 SOLUTION ORAL at 17:52

## 2025-03-28 RX ADMIN — DICYCLOMINE HYDROCHLORIDE 20 MG: 10 INJECTION, SOLUTION INTRAMUSCULAR at 17:52

## 2025-03-28 RX ADMIN — ASPIRIN 81 MG CHEWABLE TABLET 324 MG: 81 TABLET CHEWABLE at 07:51

## 2025-03-28 RX ADMIN — DICYCLOMINE HYDROCHLORIDE 20 MG: 20 TABLET ORAL at 07:54

## 2025-03-28 SDOH — ECONOMIC STABILITY - HOUSING INSECURITY: HOMELESSNESS UNSPECIFIED: Z59.00

## 2025-03-28 ASSESSMENT — PAIN DESCRIPTION - DESCRIPTORS
DESCRIPTORS: DULL;SHARP
DESCRIPTORS: CRAMPING

## 2025-03-28 ASSESSMENT — HEART SCORE: ECG: NORMAL

## 2025-03-28 ASSESSMENT — PAIN SCALES - GENERAL
PAINLEVEL_OUTOF10: 6
PAINLEVEL_OUTOF10: 4
PAINLEVEL_OUTOF10: 10
PAINLEVEL_OUTOF10: 6

## 2025-03-28 ASSESSMENT — PAIN - FUNCTIONAL ASSESSMENT
PAIN_FUNCTIONAL_ASSESSMENT: 0-10
PAIN_FUNCTIONAL_ASSESSMENT: 0-10

## 2025-03-28 ASSESSMENT — PAIN DESCRIPTION - LOCATION
LOCATION: ABDOMEN;CHEST
LOCATION: ABDOMEN

## 2025-03-28 ASSESSMENT — PAIN DESCRIPTION - PAIN TYPE: TYPE: ACUTE PAIN

## 2025-03-28 ASSESSMENT — PAIN DESCRIPTION - FREQUENCY: FREQUENCY: CONTINUOUS

## 2025-03-28 ASSESSMENT — PAIN DESCRIPTION - ORIENTATION: ORIENTATION: UPPER

## 2025-03-28 NOTE — ED PROVIDER NOTES
Emergency Department Encounter    Patient: Mario Orellana  MRN: [id number]  : 1990  Date of Evaluation: 3/28/2025  ED Provider:  Judit Chaparro MD    Triage Chief Complaint:   Chest Pain (CHest pain, brought in by EMS. Styarted this AM)    [place]:  Mario Orellana is a 34 y.o. male that presents with concern for chest pain and abdominal pain.  Reports he is having worse abdominal cramping this morning and it radiates up into his chest.  He told EMS that he was having chest pain.  He denies current shortness of breath.  He had some cough and congestion recently.  He is still homeless.  He has abdominal pain mostly every day.  He states that he was able to fill his prescriptions after we he was seen here recently.  He is requesting a Bentyl shot.  He denies vomiting.  He has had diarrhea, this is also chronic.  Reports pain is 10 out of 10    ROS - see HPI, below listed is current ROS at time of my eval:  10 systems reviewed and negative except as above.     Past Medical History:   Diagnosis Date    Bipolar 1 disorder (HCC)     Chronic generalized abdominal pain     Depression     GERD (gastroesophageal reflux disease)     IBS (irritable bowel syndrome)     Mental impairment     OCD (obsessive compulsive disorder)     Schizo affective schizophrenia (HCC)      Past Surgical History:   Procedure Laterality Date    COLONOSCOPY      does not know    EGD      does not know     Family History   Problem Relation Age of Onset    Colon Cancer Neg Hx     Esophageal Cancer Neg Hx     Liver Cancer Neg Hx     Rectal Cancer Neg Hx     Stomach Cancer Neg Hx      Social History     Socioeconomic History    Marital status: Single     Spouse name: Not on file    Number of children: Not on file    Years of education: Not on file    Highest education level: Not on file   Occupational History    Not on file   Tobacco Use    Smoking status: Never    Smokeless tobacco: Never   Vaping Use    Vaping status: Never Used   Substance and  Unknown (1/23/2024)    [facility]    Housing/Utilities     Worried about losing home: Not on file     Stayed outside house: Not on file     Unable to get utilities: Not on file     No current facility-administered medications for this encounter.     Current Outpatient Medications   Medication Sig Dispense Refill    dicyclomine (BENTYL) 10 MG capsule Take 1 capsule by mouth 4 times daily as needed (cramping abominal pain) 28 capsule 0    ondansetron (ZOFRAN-ODT) 4 MG disintegrating tablet Take 1 tablet by mouth 3 times daily as needed for Nausea or Vomiting 21 tablet 0    naproxen (NAPROSYN) 500 MG tablet Take 1 tablet by mouth 2 times daily as needed for Pain 60 tablet 0    famotidine (PEPCID) 20 MG tablet Take 1 tablet by mouth 2 times daily 14 tablet 0    calcium carbonate (ANTACID) 500 MG chewable tablet Take 1 tablet by mouth daily 30 tablet 0    acetaminophen (TYLENOL) 500 MG tablet Take 1 tablet by mouth 4 times daily as needed for Pain 360 tablet 1    docusate sodium (COLACE) 100 MG capsule Take 1 capsule by mouth 2 times daily 40 capsule 0    witch hazel-glycerin (TUCKS) pad Place rectally as needed. 50 each 4    famotidine (PEPCID) 20 MG tablet Take 1 tablet by mouth 2 times daily 60 tablet 0     Allergies   Allergen Reactions    Latex     Adhesive Tape     Codeine     Iodine     Shellfish-Derived Products        Nursing Notes Reviewed    Physical Exam:  ED Triage Vitals [03/28/25 0720]   Encounter Vitals Group      /75      Systolic BP Percentile       Diastolic BP Percentile       Pulse 98      Respirations 20      Temp 97.7 °F (36.5 °C)      Temp Source Oral      SpO2 99 %      Weight - Scale 64.4 kg (142 lb)      Height 1.6 m (5' 3\")      Head Circumference       Peak Flow       Pain Score       Pain Loc       Pain Education       Exclude from Growth Chart            My pulse ox interpretation is - normal    General appearance:  No acute distress.

## 2025-03-28 NOTE — ED NOTES
Discharge education completed with pt, verbalized understanding. Pt ambulated off unit with all belongings and discharge paperwork at this time without incident.

## 2025-03-28 NOTE — ED PROVIDER NOTES
[facility] EMERGENCY DEPARTMENT  EMERGENCY DEPARTMENT ENCOUNTER        Pt Name: Mario Orellana  MRN: [id number]  Birthdate 1990  Date of evaluation: 3/28/2025  Provider: Cesario Krueger PA-C  PCP: No primary care provider on file.  Note Started: 5:45 PM EDT 3/28/25      TALA. I have evaluated this patient.        CHIEF COMPLAINT       Chief Complaint   Patient presents with    Abdominal Pain       HISTORY OF PRESENT ILLNESS: 1 or more Elements     History From:     Limitations to history : None    Social Determinants Significantly Affecting Health : Patient has significant healthcare illiteracy. Additional time provided in explanations.    Chief Complaint: Abdominal pain, requesting IM Bentyl show    Mario Orellana is a 34 y.o. male with past medical history of schizoaffective disorder, IBS, chronic abdominal who presents complaining of chronic abdominal pain.  Patient states his abdomen is burning, typical of his prior chronic abdominal pain.  Seen in this ER yesterday and this morning for similar with negative workups.  Seen at multiple ERs, this 1 in [place] for similar abdominal pain with negative workups.  Patient requesting his IM Bentyl, states that usually helps.  He was here this morning oral Bentyl and thinks it did not help as much.  Denies any trauma, fever, vomiting, diarrhea, constipation, changes in urination.  Ambulated into triage without difficulty    Nursing Notes were all reviewed and agreed with or any disagreements were addressed in the HPI.    REVIEW OF SYSTEMS :      Review of Systems   All other systems reviewed and are negative.      Positives and Pertinent negatives as per HPI.     SURGICAL HISTORY     Past Surgical History:   Procedure Laterality Date    COLONOSCOPY      does not know    EGD      does not know       CURRENTMEDICATIONS       Previous Medications    ACETAMINOPHEN (TYLENOL) 500 MG TABLET    Take 1 tablet by mouth 4 times daily as needed for

## 2025-03-29 ENCOUNTER — HOSPITAL ENCOUNTER (EMERGENCY)
Age: 35
Discharge: HOME | End: 2025-03-29
Payer: MEDICARE

## 2025-03-29 ENCOUNTER — HOSPITAL ENCOUNTER (EMERGENCY)
Age: 35
Discharge: HOME | End: 2025-03-30
Attending: EMERGENCY MEDICINE
Payer: MEDICARE

## 2025-03-29 VITALS
HEART RATE: 99 BPM | RESPIRATION RATE: 18 BRPM | WEIGHT: 142 LBS | TEMPERATURE: 98.3 F | BODY MASS INDEX: 25.16 KG/M2 | OXYGEN SATURATION: 94 % | DIASTOLIC BLOOD PRESSURE: 85 MMHG | HEIGHT: 63 IN | SYSTOLIC BLOOD PRESSURE: 133 MMHG

## 2025-03-29 VITALS
SYSTOLIC BLOOD PRESSURE: 123 MMHG | OXYGEN SATURATION: 97 % | DIASTOLIC BLOOD PRESSURE: 90 MMHG | HEART RATE: 97 BPM | TEMPERATURE: 97.9 F | RESPIRATION RATE: 19 BRPM

## 2025-03-29 DIAGNOSIS — K21.9 GASTROESOPHAGEAL REFLUX DISEASE, UNSPECIFIED WHETHER ESOPHAGITIS PRESENT: Primary | ICD-10-CM

## 2025-03-29 DIAGNOSIS — Z59.00 HOMELESSNESS: Primary | ICD-10-CM

## 2025-03-29 DIAGNOSIS — R10.84 GENERALIZED ABDOMINAL PAIN: ICD-10-CM

## 2025-03-29 LAB
ALBUMIN SERPL-MCNC: 4.4 G/DL (ref 3.4–5)
ALBUMIN/GLOB SERPL: 1.7 {RATIO} (ref 1.1–2.2)
ALP SERPL-CCNC: 72 U/L (ref 40–129)
ALT SERPL-CCNC: 17 U/L (ref 10–40)
ANION GAP SERPL CALCULATED.3IONS-SCNC: 12 MMOL/L (ref 9–17)
AST SERPL-CCNC: 26 U/L (ref 15–37)
BASOPHILS # BLD: 0.03 K/UL
BASOPHILS NFR BLD: 0 % (ref 0–1)
BILIRUB SERPL-MCNC: 0.3 MG/DL (ref 0–1)
BUN SERPL-MCNC: 8 MG/DL (ref 7–20)
CALCIUM SERPL-MCNC: 9.8 MG/DL (ref 8.3–10.6)
CHLORIDE SERPL-SCNC: 101 MMOL/L (ref 99–110)
CO2 SERPL-SCNC: 28 MMOL/L (ref 21–32)
CREAT SERPL-MCNC: 1 MG/DL (ref 0.9–1.3)
EOSINOPHIL # BLD: 0.22 K/UL
EOSINOPHILS RELATIVE PERCENT: 3 % (ref 0–3)
ERYTHROCYTE [DISTWIDTH] IN BLOOD BY AUTOMATED COUNT: 12 % (ref 11.7–14.9)
GFR, ESTIMATED: 89 ML/MIN/1.73M2
GLUCOSE SERPL-MCNC: 97 MG/DL (ref 74–99)
HCT VFR BLD AUTO: 40.7 % (ref 42–52)
HGB BLD-MCNC: 13.7 G/DL (ref 13.5–18)
IMM GRANULOCYTES # BLD AUTO: 0.02 K/UL
IMM GRANULOCYTES NFR BLD: 0 %
LIPASE SERPL-CCNC: 38 U/L (ref 13–60)
LYMPHOCYTES NFR BLD: 2.01 K/UL
LYMPHOCYTES RELATIVE PERCENT: 26 % (ref 24–44)
MCH RBC QN AUTO: 31.5 PG (ref 27–31)
MCHC RBC AUTO-ENTMCNC: 33.7 G/DL (ref 32–36)
MCV RBC AUTO: 93.6 FL (ref 78–100)
MONOCYTES NFR BLD: 0.66 K/UL
MONOCYTES NFR BLD: 9 % (ref 0–4)
NEUTROPHILS NFR BLD: 61 % (ref 36–66)
NEUTS SEG NFR BLD: 4.66 K/UL
PLATELET # BLD AUTO: 225 K/UL (ref 140–440)
PMV BLD AUTO: 9.5 FL (ref 7.5–11.1)
POTASSIUM SERPL-SCNC: 3.7 MMOL/L (ref 3.5–5.1)
PROT SERPL-MCNC: 6.9 G/DL (ref 6.4–8.2)
RBC # BLD AUTO: 4.35 M/UL (ref 4.6–6.2)
SODIUM SERPL-SCNC: 140 MMOL/L (ref 136–145)
WBC OTHER # BLD: 7.6 K/UL (ref 4–10.5)

## 2025-03-29 PROCEDURE — 83690 ASSAY OF LIPASE: CPT

## 2025-03-29 PROCEDURE — 6370000000 HC RX 637 (ALT 250 FOR IP)

## 2025-03-29 PROCEDURE — 80053 COMPREHEN METABOLIC PANEL: CPT

## 2025-03-29 PROCEDURE — 6360000002 HC RX W HCPCS

## 2025-03-29 PROCEDURE — 99284 EMERGENCY DEPT VISIT MOD MDM: CPT

## 2025-03-29 PROCEDURE — 85025 COMPLETE CBC W/AUTO DIFF WBC: CPT

## 2025-03-29 PROCEDURE — 99283 EMERGENCY DEPT VISIT LOW MDM: CPT

## 2025-03-29 PROCEDURE — 36415 COLL VENOUS BLD VENIPUNCTURE: CPT

## 2025-03-29 PROCEDURE — 96372 THER/PROPH/DIAG INJ SC/IM: CPT

## 2025-03-29 RX ORDER — IBUPROFEN 600 MG/1
600 TABLET, FILM COATED ORAL EVERY 8 HOURS PRN
Qty: 30 TABLET | Refills: 0 | Status: SHIPPED | OUTPATIENT
Start: 2025-03-29

## 2025-03-29 RX ORDER — DICYCLOMINE HYDROCHLORIDE 10 MG/ML
20 INJECTION INTRAMUSCULAR ONCE
Status: COMPLETED | OUTPATIENT
Start: 2025-03-29 | End: 2025-03-29

## 2025-03-29 RX ORDER — FAMOTIDINE 20 MG/1
20 TABLET, FILM COATED ORAL 2 TIMES DAILY
Qty: 60 TABLET | Refills: 3 | Status: SHIPPED | OUTPATIENT
Start: 2025-03-29

## 2025-03-29 RX ORDER — MAGNESIUM HYDROXIDE/ALUMINUM HYDROXICE/SIMETHICONE 120; 1200; 1200 MG/30ML; MG/30ML; MG/30ML
30 SUSPENSION ORAL EVERY 6 HOURS PRN
Status: DISCONTINUED | OUTPATIENT
Start: 2025-03-29 | End: 2025-03-29 | Stop reason: HOSPADM

## 2025-03-29 RX ORDER — IBUPROFEN 400 MG/1
800 TABLET, FILM COATED ORAL ONCE
Status: DISCONTINUED | OUTPATIENT
Start: 2025-03-30 | End: 2025-03-30 | Stop reason: HOSPADM

## 2025-03-29 RX ORDER — FAMOTIDINE 20 MG/1
20 TABLET, FILM COATED ORAL ONCE
Status: COMPLETED | OUTPATIENT
Start: 2025-03-29 | End: 2025-03-29

## 2025-03-29 RX ORDER — DICYCLOMINE HCL 20 MG
20 TABLET ORAL ONCE
Status: COMPLETED | OUTPATIENT
Start: 2025-03-30 | End: 2025-03-30

## 2025-03-29 RX ADMIN — ALUMINUM HYDROXIDE, MAGNESIUM HYDROXIDE, AND SIMETHICONE 30 ML: 200; 200; 20 SUSPENSION ORAL at 18:55

## 2025-03-29 RX ADMIN — FAMOTIDINE 20 MG: 20 TABLET, FILM COATED ORAL at 18:55

## 2025-03-29 RX ADMIN — DICYCLOMINE HYDROCHLORIDE 20 MG: 10 INJECTION, SOLUTION INTRAMUSCULAR at 18:55

## 2025-03-29 SDOH — ECONOMIC STABILITY - HOUSING INSECURITY: HOMELESSNESS UNSPECIFIED: Z59.00

## 2025-03-29 ASSESSMENT — PAIN - FUNCTIONAL ASSESSMENT: PAIN_FUNCTIONAL_ASSESSMENT: 0-10

## 2025-03-29 ASSESSMENT — PAIN DESCRIPTION - PAIN TYPE: TYPE: CHRONIC PAIN

## 2025-03-29 ASSESSMENT — PAIN SCALES - GENERAL
PAINLEVEL_OUTOF10: 10
PAINLEVEL_OUTOF10: 10

## 2025-03-29 ASSESSMENT — PAIN DESCRIPTION - LOCATION: LOCATION: ABDOMEN

## 2025-03-29 ASSESSMENT — PAIN DESCRIPTION - DESCRIPTORS: DESCRIPTORS: CRAMPING
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
## 2025-03-29 NOTE — ED PROVIDER NOTES
[facility] EMERGENCY DEPARTMENT  EMERGENCY DEPARTMENT ENCOUNTER        Pt Name: Mario Orellana  MRN: [id number]  Birthdate 1990  Date of evaluation: 3/29/2025  Provider: ROSE Rosas CNP  PCP: No primary care provider on file.  Note Started: 8:44 PM EDT 3/29/25      TALA. I have evaluated this patient.        CHIEF COMPLAINT       Chief Complaint   Patient presents with    Abdominal Pain       HISTORY OF PRESENT ILLNESS: 1 or more Elements     History From: Patient    Limitations to history : None    Social Determinants Significantly Affecting Health : None    Chief Complaint: Burning abdominal pain chronic    Mario Orellana is a 34 y.o. male history of chronic abdominal pain who presents to ED stating the abdominal pain that he always has which is like burning and is if he has not eaten.  States the medicines he received yesterday helped him a lot..  Reports he is having bowel movements that are regular without dark-colored stools or blood in his stools.  States he did have a bowel movement yesterday which was soft..  He denies any nausea vomiting or lower abdominal pain.  Denies any dysuria or hematuria.  Is requesting if he could have a Tamela mist and something to eat it may help him.  Denies any history of recent antacid medication.  States he ran out.  Denies any GI follow-up.  Denies any chest pain shortness of breath fevers body aches or chills.  Denies any testicular swelling or pain.  Reports his pain is the same pain that he always has.    Nursing Notes were all reviewed and agreed with or any disagreements were addressed in the HPI.    REVIEW OF SYSTEMS :      Review of Systems    Positives and Pertinent negatives as per HPI.     SURGICAL HISTORY     Past Surgical History:   Procedure Laterality Date    COLONOSCOPY      does not know    EGD      does not know       CURRENTMEDICATIONS       Discharge Medication List as of 3/29/2025  7:35 PM        CONTINUE these  Considerations (tests considered but not done, Admit vs D/C, Shared Decision Making, Pt Expectation of Test or Tx.): Patient discharged in stable condition with close outpatient follow-up       The patient tolerated their visit well.  The patient and / or the family were informed of the results of any tests, a time was given to answer questions, a plan was proposed and they agreed with plan.    I am the Primary Clinician of Record.  FINAL IMPRESSION      1. Gastroesophageal reflux disease, unspecified whether esophagitis present          DISPOSITION/PLAN     DISPOSITION Decision To Discharge 03/29/2025 07:29:45 PM   DISPOSITION CONDITION Stable           PATIENT REFERRED TO:  Sukhjinder Laguna MD  [address] [place] [address]    Schedule an appointment as soon as possible for a visit         DISCHARGE MEDICATIONS:  Discharge Medication List as of 3/29/2025  7:35 PM          DISCONTINUED MEDICATIONS:  Discharge Medication List as of 3/29/2025  7:35 PM                 (Please note that portions of this note were completed with a voice recognition program.  Efforts were made to edit the dictations but occasionally words are mis-transcribed.)    ROSE Rosas CNP (electronically signed)        Jennifer Kothari APRN - CNP  03/29/25 2052

## 2025-03-29 NOTE — DISCHARGE INSTRUCTIONS
Adult Mental Health Outpatient Resources    -[facility]    [address], [place]    [phone]    Walk-in-hours M-F 8-10 am  - [facility]  [phone]  --[facility]               [address], [place] [phone]              [address], [place] [phone]              [address] [place], [place]        [phone]   -[facility]               [address], [place]    [phone]  - [facility]                [address], [place]    [phone]  -[facility]          [address], [place] [phone]  -[facility]   [address] [facility] [address] [place], [place] [phone]   [address] [place], [place] [phone]  -[facility]   [address] [place], [place], [address] [phone]  - [facility]   [address], [phone]
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
## 2025-03-29 NOTE — ED TRIAGE NOTES
Patient presents with c/o worsening abdominal pain. Seen multiple times for similar complaint. Adds the medication received yesterday really helped.

## 2025-03-30 ENCOUNTER — HOSPITAL ENCOUNTER (EMERGENCY)
Age: 35
Discharge: HOME | End: 2025-03-30
Attending: STUDENT IN AN ORGANIZED HEALTH CARE EDUCATION/TRAINING PROGRAM
Payer: MEDICARE

## 2025-03-30 VITALS
HEART RATE: 111 BPM | RESPIRATION RATE: 16 BRPM | BODY MASS INDEX: 26.05 KG/M2 | WEIGHT: 147 LBS | SYSTOLIC BLOOD PRESSURE: 125 MMHG | DIASTOLIC BLOOD PRESSURE: 85 MMHG | OXYGEN SATURATION: 96 % | TEMPERATURE: 98.8 F | HEIGHT: 63 IN

## 2025-03-30 DIAGNOSIS — R10.9 CHRONIC ABDOMINAL PAIN: Primary | ICD-10-CM

## 2025-03-30 DIAGNOSIS — G89.29 CHRONIC ABDOMINAL PAIN: Primary | ICD-10-CM

## 2025-03-30 LAB
ALBUMIN SERPL-MCNC: 4.2 G/DL (ref 3.4–5)
ALBUMIN/GLOB SERPL: 1.6 {RATIO} (ref 1.1–2.2)
ALP SERPL-CCNC: 70 U/L (ref 40–129)
ALT SERPL-CCNC: 16 U/L (ref 10–40)
ANION GAP SERPL CALCULATED.3IONS-SCNC: 12 MMOL/L (ref 9–17)
AST SERPL-CCNC: 29 U/L (ref 15–37)
BASOPHILS # BLD: 0.03 K/UL
BASOPHILS NFR BLD: 0 % (ref 0–1)
BILIRUB DIRECT SERPL-MCNC: <0.2 MG/DL (ref 0–0.3)
BILIRUB INDIRECT SERPL-MCNC: NORMAL MG/DL (ref 0–0.7)
BILIRUB SERPL-MCNC: 0.4 MG/DL (ref 0–1)
BUN SERPL-MCNC: 10 MG/DL (ref 7–20)
CALCIUM SERPL-MCNC: 9.3 MG/DL (ref 8.3–10.6)
CHLORIDE SERPL-SCNC: 100 MMOL/L (ref 99–110)
CO2 SERPL-SCNC: 28 MMOL/L (ref 21–32)
CREAT SERPL-MCNC: 1 MG/DL (ref 0.9–1.3)
EOSINOPHIL # BLD: 0.21 K/UL
EOSINOPHILS RELATIVE PERCENT: 3 % (ref 0–3)
ERYTHROCYTE [DISTWIDTH] IN BLOOD BY AUTOMATED COUNT: 12.2 % (ref 11.7–14.9)
GFR, ESTIMATED: 81 ML/MIN/1.73M2
GLUCOSE SERPL-MCNC: 98 MG/DL (ref 74–99)
HCT VFR BLD AUTO: 39.8 % (ref 42–52)
HGB BLD-MCNC: 13.4 G/DL (ref 13.5–18)
IMM GRANULOCYTES # BLD AUTO: 0.01 K/UL
IMM GRANULOCYTES NFR BLD: 0 %
LIPASE SERPL-CCNC: 37 U/L (ref 13–60)
LYMPHOCYTES NFR BLD: 1.79 K/UL
LYMPHOCYTES RELATIVE PERCENT: 26 % (ref 24–44)
MAGNESIUM SERPL-MCNC: 2.3 MG/DL (ref 1.8–2.4)
MCH RBC QN AUTO: 31.5 PG (ref 27–31)
MCHC RBC AUTO-ENTMCNC: 33.7 G/DL (ref 32–36)
MCV RBC AUTO: 93.4 FL (ref 78–100)
MONOCYTES NFR BLD: 0.8 K/UL
MONOCYTES NFR BLD: 12 % (ref 0–4)
NEUTROPHILS NFR BLD: 59 % (ref 36–66)
NEUTS SEG NFR BLD: 4.02 K/UL
PLATELET # BLD AUTO: 211 K/UL (ref 140–440)
PMV BLD AUTO: 9.1 FL (ref 7.5–11.1)
POTASSIUM SERPL-SCNC: 3.7 MMOL/L (ref 3.5–5.1)
PROT SERPL-MCNC: 6.9 G/DL (ref 6.4–8.2)
RBC # BLD AUTO: 4.26 M/UL (ref 4.6–6.2)
SODIUM SERPL-SCNC: 140 MMOL/L (ref 136–145)
WBC OTHER # BLD: 6.9 K/UL (ref 4–10.5)

## 2025-03-30 PROCEDURE — 85025 COMPLETE CBC W/AUTO DIFF WBC: CPT

## 2025-03-30 PROCEDURE — 6370000000 HC RX 637 (ALT 250 FOR IP): Performed by: EMERGENCY MEDICINE

## 2025-03-30 PROCEDURE — 6360000002 HC RX W HCPCS: Performed by: STUDENT IN AN ORGANIZED HEALTH CARE EDUCATION/TRAINING PROGRAM

## 2025-03-30 PROCEDURE — 6370000000 HC RX 637 (ALT 250 FOR IP): Performed by: STUDENT IN AN ORGANIZED HEALTH CARE EDUCATION/TRAINING PROGRAM

## 2025-03-30 PROCEDURE — 83735 ASSAY OF MAGNESIUM: CPT

## 2025-03-30 PROCEDURE — 96372 THER/PROPH/DIAG INJ SC/IM: CPT

## 2025-03-30 PROCEDURE — 82248 BILIRUBIN DIRECT: CPT

## 2025-03-30 PROCEDURE — 83690 ASSAY OF LIPASE: CPT

## 2025-03-30 PROCEDURE — 80053 COMPREHEN METABOLIC PANEL: CPT

## 2025-03-30 PROCEDURE — 99284 EMERGENCY DEPT VISIT MOD MDM: CPT

## 2025-03-30 RX ORDER — DICYCLOMINE HYDROCHLORIDE 10 MG/ML
20 INJECTION INTRAMUSCULAR ONCE
Status: COMPLETED | OUTPATIENT
Start: 2025-03-30 | End: 2025-03-30

## 2025-03-30 RX ORDER — KETOROLAC TROMETHAMINE 15 MG/ML
15 INJECTION, SOLUTION INTRAMUSCULAR; INTRAVENOUS ONCE
Status: COMPLETED | OUTPATIENT
Start: 2025-03-30 | End: 2025-03-30

## 2025-03-30 RX ORDER — MAGNESIUM HYDROXIDE/ALUMINUM HYDROXICE/SIMETHICONE 120; 1200; 1200 MG/30ML; MG/30ML; MG/30ML
30 SUSPENSION ORAL ONCE
Status: COMPLETED | OUTPATIENT
Start: 2025-03-30 | End: 2025-03-30

## 2025-03-30 RX ADMIN — KETOROLAC TROMETHAMINE 15 MG: 15 INJECTION, SOLUTION INTRAMUSCULAR; INTRAVENOUS at 19:48

## 2025-03-30 RX ADMIN — DICYCLOMINE HYDROCHLORIDE 20 MG: 20 TABLET ORAL at 00:08

## 2025-03-30 RX ADMIN — DICYCLOMINE HYDROCHLORIDE 20 MG: 10 INJECTION, SOLUTION INTRAMUSCULAR at 19:49

## 2025-03-30 RX ADMIN — ALUMINUM HYDROXIDE, MAGNESIUM HYDROXIDE, AND SIMETHICONE 30 ML: 200; 200; 20 SUSPENSION ORAL at 19:49

## 2025-03-30 ASSESSMENT — PAIN SCALES - GENERAL
PAINLEVEL_OUTOF10: 0
PAINLEVEL_OUTOF10: 10
PAINLEVEL_OUTOF10: 10

## 2025-03-30 ASSESSMENT — PAIN - FUNCTIONAL ASSESSMENT
PAIN_FUNCTIONAL_ASSESSMENT: 0-10
PAIN_FUNCTIONAL_ASSESSMENT: 0-10

## 2025-03-30 ASSESSMENT — PAIN DESCRIPTION - LOCATION
LOCATION: ABDOMEN
LOCATION: ABDOMEN

## 2025-03-30 NOTE — DISCHARGE INSTRUCTIONS
You can use acetaminophen as needed for pain  You can use ibuprofen as needed for pain  You can take your previously prescribed bentyl to help with pain  Call and follow-up with your family doctor in the next 1-3 days  Return to the ED if your symptoms worsen or you feel you need to be reevaluated    You can use the resources below to find a new family doctor if needed:                                                Primary Care Physicians    [facility]    Dr. Lalo Eldridge MD  [facility]  [address]  [place], [place] [address]  [phone]    Lucia Hallman CNP  [facility]  [address]  [place], [place] [address]  [phone]    [facility]    Roxana Eldridge MD  [facility] [address]  [place], [place] [address]  [phone]      [facility].     James Piper MD  [address]  [place], [place] [address]  [phone]    Mildred Rouse [facility]  [address] [place] [address]  [phone]    Melissa Zuleta [facility]   [address] [place] [address]  [phone]    Nohemi Rodriguez MD  [address]  [place], [place] [address]  [phone]     Roel Chang MD  [address]  [place], [place] [address]  [phone]    Kanchan Rees PA-C  [address]  [place], [place] [address]  [phone]    Primary Care Providers [facility]    Dr. Tj Villa MD  [address] [place] [address]  [phone]    Dr. Lamont Reyna MD   [address] [place] [address]  [phone]    Primary Care Providers Andra Huynh MD  [address] [place], [place] [address] [phone]       [facility]    Mckayla Hood MD  [address] [address]  [phone]    Dereck Gutierrez CNP  [facility]  [address] [id number]  [phone]       Internal Med    Melissa Garcia NP  [address], [place] [address]  [phone]        [facility]    Debbie

## 2025-03-30 NOTE — ED NOTES
The following labs were labeled with appropriate pt sticker and tubed to lab:   Labs collected venipuncture.  [x] Blue     [x] Lavender   [] on ice  [x] Green/yellow  [] Green/black [] on ice  [x] Grey  [x] on ice  [] Yellow  [x] Red  [] Pink  [] Type/ Screen  [] ABG  [] VBG    [] COVID-19 swab    [] Rapid  [] PCR  [] Flu swab  [] Peds Viral Panel     [] Urine Sample  [] Fecal Sample  [] Pelvic Cultures  [] Blood Cultures  [] X 2  [] STREP Cultures  [] Wound Cultures

## 2025-03-30 NOTE — ED PROVIDER NOTES
been dictated by use of voice recognition software and electronically transcribed, and parts may have been transcribed with the assistance of an ED scribe and may contain errors related to that system including errors in grammar, punctuation, and spelling, as well as words and phrases that may be inappropriate.  The transcription may contain errors not detected in proofreading.  Efforts were made to edit the dictations.    Electronically Signed: Braulio Pineda MD, 03/30/25, 7:40 PM    I am the Primary Clinician of Record.      Clinical Impression:  1. Chronic abdominal pain      Disposition referral (if applicable):  [facility]  [address] [place] [address] [address] [place] [address]  [phone]        Disposition medications (if applicable):  New Prescriptions    No medications on file     ED Provider Disposition Time  DISPOSITION Discharge - Pending Orders Complete 03/30/2025 07:35:08 PM   DISPOSITION CONDITION Stable                 Braulio Pineda MD  03/30/25 [id number]

## 2025-03-30 NOTE — CARE COORDINATION
CM reviewed pt chart for discharge needs. CM is continually consulted to assist pt with his needs for housing, food and rides on his many visits to the ER.     CM has assisted pt as much as possible however pt has been banned from the local shelters due to behavior, pt is banned from taxi services due to behavior. Pt has been provided a ride to [facility], prior to being banned from [employer] taxi service and pt returned from [place] via his own transportation resources.     Pt has been provided with food numerous times and  has been given the schedule for all the local food pantries.     At this point CM has attempted to assist pt but pt has continued to eliminate potential resources due to unceasing behavioral issue that are not tolerated by the resource in the community. Pt has even been trespassed at this hospital due to his behavior.         Pt will again place resources in pt AVS for o/p resources. RADHA KRISHNAMURTHY/RITA

## 2025-03-30 NOTE — ED PROVIDER NOTES
CHIEF COMPLAINT    Chief Complaint   Patient presents with    Abdominal Pain     HPI  Mario Orellana is a 34 y.o. male with history of schizoaffective disorder, chronic abdominal pain, malingering who presents to the ED via EMS with complaints of abdominal pain.  Patient was evaluated just earlier today with complaints of chronic abdominal pain.  He has a history of the same and has been evaluated for the same complaint on multiple occasions.  His evaluation earlier today included CMP and lipase which were reassuring.  He had workup yesterday with complaints of abdominal pain/chest pain at which time laboratory says are reassuring as well.  He tells me that he has diffuse abdominal pain yet again and would like Toradol as that helps his pain.  He also is requesting a place to stay overnight.  His pain is described as cramping and aching rated as moderate to severe in severity and constant.  Nothing makes it better.  He denies fevers, chills, nausea, vomiting, homicidal ideation, suicidal ideation      REVIEW OF SYSTEMS  Constitutional: No fever, chills   Eye: No visual changes  HENT: No earache or sore throat.  Resp: No SOB or productive cough.  Cardio: No chest pain or palpitations.  GI: Complains of abdominal pain.  No nausea or vomiting  : No dysuria, urgency or frequency.  Endocrine: No heat intolerance, no cold intolerance, no polydipsia   Lymphatics: No adenopathy  Musculoskeletal: No new muscle aches or joint pain.  Neuro: No headaches.  Psych: No homicidal or suicidal thoughts  Skin: No rash, No itching.  ?  ?  PAST MEDICAL HISTORY  Past Medical History:   Diagnosis Date    Bipolar 1 disorder (HCC)     Chronic generalized abdominal pain     Depression     GERD (gastroesophageal reflux disease)     IBS (irritable bowel syndrome)     Mental impairment     OCD (obsessive compulsive disorder)     Schizo affective schizophrenia (HCC)      FAMILY HISTORY  Family History   Problem Relation Age of Onset    Colon

## 2025-03-30 NOTE — ED NOTES
Patient to ED reporting abdominal pain. Patient came back up to triage desk after using bathroom asking for assistance getting somewhere to stay because he can't stay in the police station lobby.

## 2025-03-31 ENCOUNTER — HOSPITAL ENCOUNTER (EMERGENCY)
Age: 35
Discharge: HOME | End: 2025-03-31
Attending: EMERGENCY MEDICINE
Payer: MEDICARE

## 2025-03-31 VITALS
DIASTOLIC BLOOD PRESSURE: 79 MMHG | OXYGEN SATURATION: 94 % | HEART RATE: 95 BPM | TEMPERATURE: 98.1 F | RESPIRATION RATE: 20 BRPM | SYSTOLIC BLOOD PRESSURE: 109 MMHG

## 2025-03-31 DIAGNOSIS — Z59.00 HOMELESSNESS: ICD-10-CM

## 2025-03-31 DIAGNOSIS — R10.9 CHRONIC ABDOMINAL PAIN: Primary | ICD-10-CM

## 2025-03-31 DIAGNOSIS — G89.29 CHRONIC ABDOMINAL PAIN: Primary | ICD-10-CM

## 2025-03-31 LAB
BACTERIA URNS QL MICRO: ABNORMAL
BILIRUB UR QL STRIP: ABNORMAL
CHARACTER UR: ABNORMAL
CLARITY UR: CLEAR
COLOR UR: YELLOW
GLUCOSE UR STRIP-MCNC: NEGATIVE MG/DL
HGB UR QL STRIP.AUTO: NEGATIVE
KETONES UR STRIP-MCNC: 15 MG/DL
LEUKOCYTE ESTERASE UR QL STRIP: NEGATIVE
MUCOUS THREADS URNS QL MICRO: ABNORMAL
NITRITE UR QL STRIP: NEGATIVE
PH UR STRIP: 5.5 [PH] (ref 5–8)
PROT UR STRIP-MCNC: 30 MG/DL
RBC #/AREA URNS HPF: 1 /HPF (ref 0–2)
SP GR UR STRIP: >1.03 (ref 1–1.03)
UROBILINOGEN UR STRIP-ACNC: 0.2 EU/DL (ref 0–1)
WBC #/AREA URNS HPF: 3 /HPF (ref 0–5)

## 2025-03-31 PROCEDURE — 6370000000 HC RX 637 (ALT 250 FOR IP): Performed by: EMERGENCY MEDICINE

## 2025-03-31 PROCEDURE — 99284 EMERGENCY DEPT VISIT MOD MDM: CPT

## 2025-03-31 PROCEDURE — 96372 THER/PROPH/DIAG INJ SC/IM: CPT

## 2025-03-31 PROCEDURE — 81001 URINALYSIS AUTO W/SCOPE: CPT

## 2025-03-31 PROCEDURE — 6360000002 HC RX W HCPCS: Performed by: EMERGENCY MEDICINE

## 2025-03-31 RX ORDER — MAGNESIUM HYDROXIDE/ALUMINUM HYDROXICE/SIMETHICONE 120; 1200; 1200 MG/30ML; MG/30ML; MG/30ML
30 SUSPENSION ORAL ONCE
Status: COMPLETED | OUTPATIENT
Start: 2025-03-31 | End: 2025-03-31

## 2025-03-31 RX ORDER — DICYCLOMINE HYDROCHLORIDE 10 MG/ML
20 INJECTION INTRAMUSCULAR ONCE
Status: COMPLETED | OUTPATIENT
Start: 2025-03-31 | End: 2025-03-31

## 2025-03-31 RX ADMIN — DICYCLOMINE HYDROCHLORIDE 20 MG: 10 INJECTION, SOLUTION INTRAMUSCULAR at 12:33

## 2025-03-31 RX ADMIN — ALUMINUM HYDROXIDE, MAGNESIUM HYDROXIDE, AND SIMETHICONE 30 ML: 200; 200; 20 SUSPENSION ORAL at 12:33

## 2025-03-31 SDOH — ECONOMIC STABILITY - HOUSING INSECURITY: HOMELESSNESS UNSPECIFIED: Z59.00

## 2025-03-31 ASSESSMENT — PAIN DESCRIPTION - DESCRIPTORS
DESCRIPTORS: CRAMPING
DESCRIPTORS: CRAMPING

## 2025-03-31 ASSESSMENT — PAIN SCALES - GENERAL
PAINLEVEL_OUTOF10: 0
PAINLEVEL_OUTOF10: 8
PAINLEVEL_OUTOF10: 6

## 2025-03-31 ASSESSMENT — PAIN - FUNCTIONAL ASSESSMENT
PAIN_FUNCTIONAL_ASSESSMENT: 0-10
PAIN_FUNCTIONAL_ASSESSMENT: 0-10

## 2025-03-31 ASSESSMENT — PAIN DESCRIPTION - LOCATION: LOCATION: ABDOMEN

## 2025-03-31 NOTE — DISCHARGE INSTRUCTIONS
Providers Welcoming New Patients:      [facility]    Cesario Sinha, APRN-CNP  [address] [place] [address]  ?? [phone]    Nasrin Rangel, APRN-CNP  [address] [place] [address]  ?? [phone]    Agus Erickson M.D.  [address] [place] [address] [place] [address]  ?? [phone]    Arely Murray, APRN-CNP  Marlene Ly, APRN-CNP  [address], [place] [address]  ?? [phone]    Marilia Velasco M.D.  Daryl Hartman M.D.   DARWIN Temple, APRN-CNP  [address], [place] [address]  ?? [phone]    Reji See M.D.  [address], [place] [address]  ?? [phone]    Mildred Rouse, APRN-CNP  [address], [place] [address]  ?? [phone]        [facility]    Sandra Lazo M.D.  Cary Donato NP  [address] [place] [address]  ?? [phone]    Gretel Awad, APRN-CNP - Pediatrics only  [address], [place] [address]  ?? [phone]    Roxana Eldridge M.D.  Daniel Islas M.D.  [address], [place] [address]  ?? [phone]    Moise Shepard, APRN-CNP  [address], [place] [address]  ?? [phone]      ?? Please note that new patient appointment wait times may vary among providers.      Please go to [employer] or call [phone] to find a new provider.  You can also use the QR code below:          ++++++++++++++++++++++++++++++++++++++++++++++++++++++++++++++++++++      For Acute Care Needs or Prescription Refills Before Your New Patient Appointment:    [facility]In [facility]  [address], [place] [address]  ?? [phone]    [facility]  [address] [address], [place] [address]  ?? [phone]

## 2025-03-31 NOTE — ED PROVIDER NOTES
Emergency Department Encounter  Location: [facility] EMERGENCY DEPARTMENT    Patient: Mario Orellana  MRN: [id number]  : 1990  Date of evaluation: 3/31/2025  ED Provider: My Jamison DO    Chief Complaint:    Abdominal Pain    [place]:  Mario Orellana is a 34 y.o. male that presents to the emergency department with concern for abdominal discomfort.  Is not clear when it began.  He states he has been able to eat and drink.  No vomiting or acute change in bowel habits.  No reported fevers or chills.  Patient states he needs Mylanta and Bentyl as this helps his abdominal pain.      Past Medical History:   Diagnosis Date    Bipolar 1 disorder (HCC)     Chronic generalized abdominal pain     Depression     GERD (gastroesophageal reflux disease)     IBS (irritable bowel syndrome)     Mental impairment     OCD (obsessive compulsive disorder)     Schizo affective schizophrenia (HCC)      Past Surgical History:   Procedure Laterality Date    COLONOSCOPY      does not know    EGD      does not know     Family History   Problem Relation Age of Onset    Colon Cancer Neg Hx     Esophageal Cancer Neg Hx     Liver Cancer Neg Hx     Rectal Cancer Neg Hx     Stomach Cancer Neg Hx      Social History     Socioeconomic History    Marital status: Single     Spouse name: Not on file    Number of children: Not on file    Years of education: Not on file    Highest education level: Not on file   Occupational History    Not on file   Tobacco Use    Smoking status: Never    Smokeless tobacco: Never   Vaping Use    Vaping status: Never Used   Substance and Sexual Activity    Alcohol use: Never    Drug use: Never    Sexual activity: Not on file   Other Topics Concern    Not on file   Social History Narrative    Not on file     Social Drivers of Health     Financial Resource Strain: Low Risk  (2020)    [facility], [facility]    Overall Financial Resource Strain (CARDIA)     Difficulty of

## 2025-04-01 ENCOUNTER — HOSPITAL ENCOUNTER (EMERGENCY)
Age: 35
Discharge: HOME | End: 2025-04-01
Attending: STUDENT IN AN ORGANIZED HEALTH CARE EDUCATION/TRAINING PROGRAM
Payer: MEDICARE

## 2025-04-01 VITALS
SYSTOLIC BLOOD PRESSURE: 128 MMHG | HEART RATE: 100 BPM | DIASTOLIC BLOOD PRESSURE: 79 MMHG | TEMPERATURE: 98.2 F | RESPIRATION RATE: 18 BRPM | OXYGEN SATURATION: 100 %

## 2025-04-01 DIAGNOSIS — R10.9 ABDOMINAL CRAMPING: Primary | ICD-10-CM

## 2025-04-01 PROCEDURE — 6370000000 HC RX 637 (ALT 250 FOR IP): Performed by: STUDENT IN AN ORGANIZED HEALTH CARE EDUCATION/TRAINING PROGRAM

## 2025-04-01 PROCEDURE — 96372 THER/PROPH/DIAG INJ SC/IM: CPT

## 2025-04-01 PROCEDURE — 99284 EMERGENCY DEPT VISIT MOD MDM: CPT

## 2025-04-01 PROCEDURE — 6360000002 HC RX W HCPCS: Performed by: STUDENT IN AN ORGANIZED HEALTH CARE EDUCATION/TRAINING PROGRAM

## 2025-04-01 RX ORDER — CALCIUM CARBONATE 500 MG/1
500 TABLET, CHEWABLE ORAL ONCE
Status: COMPLETED | OUTPATIENT
Start: 2025-04-01 | End: 2025-04-01

## 2025-04-01 RX ORDER — ASPIRIN 81 MG/1
TABLET, CHEWABLE ORAL
Status: DISCONTINUED
Start: 2025-04-01 | End: 2025-04-01

## 2025-04-01 RX ORDER — DICYCLOMINE HYDROCHLORIDE 10 MG/ML
20 INJECTION INTRAMUSCULAR ONCE
Status: COMPLETED | OUTPATIENT
Start: 2025-04-01 | End: 2025-04-01

## 2025-04-01 RX ADMIN — CALCIUM CARBONATE 500 MG: 500 TABLET, CHEWABLE ORAL at 14:59

## 2025-04-01 RX ADMIN — DICYCLOMINE HYDROCHLORIDE 20 MG: 10 INJECTION, SOLUTION INTRAMUSCULAR at 14:56

## 2025-04-01 ASSESSMENT — PAIN SCALES - GENERAL
PAINLEVEL_OUTOF10: 6
PAINLEVEL_OUTOF10: 10
PAINLEVEL_OUTOF10: 10

## 2025-04-01 ASSESSMENT — PAIN DESCRIPTION - DESCRIPTORS
DESCRIPTORS: CRAMPING

## 2025-04-01 ASSESSMENT — PAIN DESCRIPTION - LOCATION
LOCATION: ABDOMEN
LOCATION: ABDOMEN

## 2025-04-01 ASSESSMENT — PAIN - FUNCTIONAL ASSESSMENT: PAIN_FUNCTIONAL_ASSESSMENT: 0-10

## 2025-04-01 ASSESSMENT — PAIN DESCRIPTION - PAIN TYPE: TYPE: ACUTE PAIN

## 2025-04-01 NOTE — ED PROVIDER NOTES
Emergency Department Encounter    Patient: Mario Orellana  MRN: [id number]  : 1990  Date of Evaluation: 2025  ED Provider:  Haroldo Yi MD    Triage Chief Complaint:   Abdominal Cramping (Reports being here for medication because he has money today to buy it )    [place]:  Mario Orellana is a 34 y.o. male with history significant for bipolar 1, chronic abdominal pain, depression, GERD, IBS, schizoaffective, that presents for abdominal cramping.  He mentions the insidious onset of abdominal pain several weeks ago, significantly worsened over the last 48 hours, diffuse, described as cramping, predominantly on the right side of the abdomen, not radiated, described as moderate.  He symptoms typically improved with dicyclomine, something that was prescribed to him but he has been able to .  He also has not follow-up with his primary care doctor in several months, and is seen in this emergency department was daily.  Denies any diarrhea or constipation.  No nausea or emesis.  No urinary, respiratory, infectious symptoms.  No chest pain.    ROS - see HPI, below listed is current ROS at time of my eval:  Systems reviewed and negative except as above.     Past Medical History:   Diagnosis Date    Bipolar 1 disorder (HCC)     Chronic generalized abdominal pain     Depression     GERD (gastroesophageal reflux disease)     IBS (irritable bowel syndrome)     Mental impairment     OCD (obsessive compulsive disorder)     Schizo affective schizophrenia (HCC)      Past Surgical History:   Procedure Laterality Date    COLONOSCOPY      does not know    EGD      does not know     Family History   Problem Relation Age of Onset    Colon Cancer Neg Hx     Esophageal Cancer Neg Hx     Liver Cancer Neg Hx     Rectal Cancer Neg Hx     Stomach Cancer Neg Hx      Social History     Socioeconomic History    Marital status: Single     Spouse name: Not on file    Number of children: Not on file    Years of